# Patient Record
Sex: MALE | Race: WHITE | NOT HISPANIC OR LATINO | Employment: FULL TIME | RURAL
[De-identification: names, ages, dates, MRNs, and addresses within clinical notes are randomized per-mention and may not be internally consistent; named-entity substitution may affect disease eponyms.]

---

## 2017-01-05 DIAGNOSIS — K21.9 GASTROESOPHAGEAL REFLUX DISEASE WITHOUT ESOPHAGITIS: ICD-10-CM

## 2017-01-05 RX ORDER — PANTOPRAZOLE SODIUM 40 MG/1
40 TABLET, DELAYED RELEASE ORAL DAILY
Qty: 30 TABLET | Refills: 5 | Status: CANCELLED | OUTPATIENT
Start: 2017-01-05

## 2017-01-06 RX ORDER — AMITRIPTYLINE HYDROCHLORIDE 25 MG/1
25 TABLET, FILM COATED ORAL NIGHTLY
Qty: 30 TABLET | Refills: 5 | Status: SHIPPED | OUTPATIENT
Start: 2017-01-06 | End: 2018-02-13 | Stop reason: SDUPTHER

## 2017-01-06 NOTE — TELEPHONE ENCOUNTER
Last OV 9/20/16    Amitriptyline  Start 4/15/16 (Historical)    Pantoprazole  9/20/16 #30 x 5 refills (too soon)

## 2017-01-20 ENCOUNTER — OFFICE VISIT (OUTPATIENT)
Dept: FAMILY MEDICINE CLINIC | Facility: CLINIC | Age: 42
End: 2017-01-20

## 2017-01-20 VITALS
DIASTOLIC BLOOD PRESSURE: 88 MMHG | HEART RATE: 84 BPM | BODY MASS INDEX: 29.05 KG/M2 | OXYGEN SATURATION: 96 % | TEMPERATURE: 98.7 F | WEIGHT: 226.4 LBS | SYSTOLIC BLOOD PRESSURE: 140 MMHG | HEIGHT: 74 IN

## 2017-01-20 DIAGNOSIS — R51.9 CHRONIC INTRACTABLE HEADACHE, UNSPECIFIED HEADACHE TYPE: ICD-10-CM

## 2017-01-20 DIAGNOSIS — G89.29 CHRONIC INTRACTABLE HEADACHE, UNSPECIFIED HEADACHE TYPE: ICD-10-CM

## 2017-01-20 DIAGNOSIS — M16.12 ARTHRITIS OF LEFT HIP: ICD-10-CM

## 2017-01-20 DIAGNOSIS — K21.9 GASTROESOPHAGEAL REFLUX DISEASE WITHOUT ESOPHAGITIS: Primary | ICD-10-CM

## 2017-01-20 DIAGNOSIS — E78.49 OTHER HYPERLIPIDEMIA: ICD-10-CM

## 2017-01-20 PROBLEM — E78.5 HYPERLIPIDEMIA: Status: ACTIVE | Noted: 2017-01-20

## 2017-01-20 PROCEDURE — 87338 HPYLORI STOOL AG IA: CPT | Performed by: NURSE PRACTITIONER

## 2017-01-20 PROCEDURE — 99213 OFFICE O/P EST LOW 20 MIN: CPT | Performed by: NURSE PRACTITIONER

## 2017-01-20 RX ORDER — DICLOFENAC POTASSIUM 50 MG/1
TABLET, FILM COATED ORAL
Refills: 5 | COMMUNITY
Start: 2016-12-16 | End: 2018-08-27

## 2017-01-20 RX ORDER — TIZANIDINE 4 MG/1
TABLET ORAL
Refills: 2 | COMMUNITY
Start: 2016-12-16 | End: 2018-08-27

## 2017-01-20 NOTE — MR AVS SNAPSHOT
Devan Alarcon   1/20/2017 9:00 AM   Office Visit    Dept Phone:  [phone]   Encounter #:  [id number]    Provider:  TONO Thorne   Department:  [facility]                Your Full Care Plan              Today's Medication Changes          These changes are accurate as of: 1/20/17 10:18 AM.  If you have any questions, ask your nurse or doctor.               Stop taking medication(s)listed here:     ibuprofen 600 MG tablet   Commonly known as:  ADVIL,MOTRIN   Stopped by:  TONO Thorne                      Your Updated Medication List          This list is accurate as of: 1/20/17 10:18 AM.  Always use your most recent med list.                amitriptyline 25 MG tablet   Commonly known as:  ELAVIL   Take 1 tablet by mouth Every Night.       diclofenac 50 MG tablet   Commonly known as:  CATAFLAM       MULTI VITAMIN DAILY tablet       pantoprazole 40 MG EC tablet   Commonly known as:  PROTONIX   Take 1 tablet by mouth daily.       tiZANidine 4 MG tablet   Commonly known as:  ZANAFLEX               We Performed the Following     Ambulatory Referral to Orthopedic Surgery       You Were Diagnosed With        Codes Comments    Gastroesophageal reflux disease without esophagitis    -  Primary ICD-10-CM: K21.9  ICD-9-CM: 530.81     Other hyperlipidemia     ICD-10-CM: E78.4  ICD-9-CM: 272.4     Chronic intractable headache, unspecified headache type     ICD-10-CM: R51  ICD-9-CM: 784.0     Arthritis of left hip     ICD-10-CM: M19.90  ICD-9-CM: 716.95       Instructions     None    Patient Instructions History      Upcoming Appointments     Visit Type Date Time Department    FOLLOW UP 1/20/2017  9:00 AM [facility] [place]    FOLLOW UP 7/21/2017  1:00 PM F[facility] Signup     Our records indicate that you have declined [facility] sign[facility]. If you would like to sign up for [facility], please email  [email or web address] or call [phone] to obtain an activation code.             Other Info from Your Visit           Your Appointments     Jul 21, 2017  1:00 PM EDT   Follow Up with TONO Thorne   [facility] MEDICINE (--)    [address] [place] [id number] [phone]           Arrive 15 minutes prior to appointment.              Allergies     No Known Allergies      Reason for Visit     Hip Pain           Vital Signs     Blood Pressure Pulse Temperature Height Weight Oxygen Saturation    140/88 (BP Location: Left arm, Patient Position: Sitting) 84 98.7 °F (37.1 °C) (Oral) 73.5\" (186.7 cm) 226 lb 6.4 oz (103 kg) 96%    Body Mass Index Smoking Status                29.46 kg/m2 Current Every Day Smoker          Problems and Diagnoses Noted     Arthritis of left hip    Chronic headache    Acid reflux disease    High cholesterol or triglycerides        "

## 2017-01-20 NOTE — PROGRESS NOTES
"Subjective   Devan Alarcon is a 41 y.o. male.   Chief Complaint   Patient presents with   • Hip Pain     History of Present Illness     The patient presents today for routine follow up. He continues to have the chronic left hip pain. This is worse with movement and physical activity. Somewhat alleviated with rest. Denies low back pain. Denies swelling and redness. Has tried several NSAIDS as well as muscle relaxant with little relief.  Did have an x-ray of the left hip shows no acute bony abnormality but some mild calcification of the superior labrum was noted.  Overall, this is persistent. He has a history of GERD well controlled with Protonix. Chronic headaches are controlled with Elavil at night. He was also noted to have elevated lipids. Did not have fasting lab recheck.     The following portions of the patient's history were reviewed and updated as appropriate: allergies, current medications, past family history, past medical history, past social history, past surgical history and problem list.  Visit Vitals   • /88 (BP Location: Left arm, Patient Position: Sitting)   • Pulse 84   • Temp 98.7 °F (37.1 °C) (Oral)   • Ht 73.5\" (186.7 cm)   • Wt 226 lb 6.4 oz (103 kg)   • SpO2 96%   • BMI 29.46 kg/m2     Review of Systems   Constitutional: Negative for chills, fatigue and fever.   HENT: Negative for congestion, ear pain, rhinorrhea and sore throat.    Respiratory: Negative for cough, shortness of breath and wheezing.    Cardiovascular: Negative for chest pain, palpitations and leg swelling.   Gastrointestinal: Negative for abdominal pain, diarrhea, nausea and vomiting.   Genitourinary: Negative for dysuria and urgency.   Musculoskeletal: Positive for arthralgias. Negative for back pain and myalgias.   Skin: Negative for rash and wound.   Neurological: Negative for dizziness and light-headedness.   Psychiatric/Behavioral: Negative for sleep disturbance. The patient is not nervous/anxious.        Objective "   Physical Exam   Constitutional: He is oriented to person, place, and time. He appears well-developed and well-nourished.   HENT:   Head: Normocephalic and atraumatic.   Cardiovascular: Normal rate, regular rhythm and normal heart sounds.    Pulmonary/Chest: Effort normal and breath sounds normal.   Abdominal: Soft. Bowel sounds are normal.   Musculoskeletal:   Left hip non tender.  Limited ROM left hip due to pain   Neurological: He is alert and oriented to person, place, and time.   Skin: Skin is warm and dry.   Psychiatric: He has a normal mood and affect. His behavior is normal.       Assessment/Plan   Devan was seen today for hip pain.    Diagnoses and all orders for this visit:    Gastroesophageal reflux disease without esophagitis    Other hyperlipidemia  -     Lipid Panel; Future  -     CBC & Differential; Future  -     Comprehensive Metabolic Panel; Future    Chronic intractable headache, unspecified headache type    Arthritis of left hip  -     Ambulatory Referral to Orthopedic Surgery      He would like to try a local injection of the hip. Will refer to orthopedics for further evaluation and treatment. Will continue the current medication regimen. We have also discussed lipids. His ASCVD risk is 11.4%, he is a smoker. He is agreeable to initiate statin. We have discussed the medication at length, including possible SE. He will return next week for fasting labs. Will plan to initiate Simvastatin at that time. Follow up here in 4 months and PRN.

## 2017-01-24 ENCOUNTER — LAB (OUTPATIENT)
Dept: FAMILY MEDICINE CLINIC | Facility: CLINIC | Age: 42
End: 2017-01-24

## 2017-01-24 DIAGNOSIS — E78.49 OTHER HYPERLIPIDEMIA: ICD-10-CM

## 2017-01-24 LAB
ALBUMIN SERPL-MCNC: 4.1 G/DL (ref 3.5–5)
ALBUMIN/GLOB SERPL: 1.3 G/DL (ref 1.5–2.5)
ALP SERPL-CCNC: 82 U/L (ref 46–116)
ALT SERPL W P-5'-P-CCNC: 36 U/L (ref 10–44)
ANION GAP SERPL CALCULATED.3IONS-SCNC: 3.3 MMOL/L (ref 3.6–11.2)
AST SERPL-CCNC: 28 U/L (ref 10–34)
BASOPHILS # BLD AUTO: 0.03 10*3/MM3 (ref 0–0.3)
BASOPHILS NFR BLD AUTO: 0.4 % (ref 0–2)
BILIRUB SERPL-MCNC: 0.8 MG/DL (ref 0.2–1.8)
BUN BLD-MCNC: 11 MG/DL (ref 7–21)
BUN/CREAT SERPL: 10 (ref 7–25)
CALCIUM SPEC-SCNC: 9.2 MG/DL (ref 7.7–10)
CHLORIDE SERPL-SCNC: 108 MMOL/L (ref 99–112)
CHOLEST SERPL-MCNC: 248 MG/DL (ref 0–200)
CO2 SERPL-SCNC: 31.7 MMOL/L (ref 24.3–31.9)
CREAT BLD-MCNC: 1.1 MG/DL (ref 0.43–1.29)
DEPRECATED RDW RBC AUTO: 44.8 FL (ref 37–54)
EOSINOPHIL # BLD AUTO: 0.15 10*3/MM3 (ref 0–0.7)
EOSINOPHIL NFR BLD AUTO: 1.9 % (ref 0–5)
ERYTHROCYTE [DISTWIDTH] IN BLOOD BY AUTOMATED COUNT: 13.5 % (ref 11.5–14.5)
GFR SERPL CREATININE-BSD FRML MDRD: 73 ML/MIN/1.73
GLOBULIN UR ELPH-MCNC: 3.2 GM/DL
GLUCOSE BLD-MCNC: 93 MG/DL (ref 70–110)
HCT VFR BLD AUTO: 48.5 % (ref 42–52)
HDLC SERPL-MCNC: 35 MG/DL (ref 60–100)
HGB BLD-MCNC: 16.3 G/DL (ref 14–18)
IMM GRANULOCYTES # BLD: 0.01 10*3/MM3 (ref 0–0.03)
IMM GRANULOCYTES NFR BLD: 0.1 % (ref 0–0.5)
LDLC SERPL CALC-MCNC: 155 MG/DL (ref 0–100)
LDLC/HDLC SERPL: 4.42 {RATIO}
LYMPHOCYTES # BLD AUTO: 3.73 10*3/MM3 (ref 1–3)
LYMPHOCYTES NFR BLD AUTO: 46.9 % (ref 21–51)
MCH RBC QN AUTO: 31 PG (ref 27–33)
MCHC RBC AUTO-ENTMCNC: 33.6 G/DL (ref 33–37)
MCV RBC AUTO: 92.4 FL (ref 80–94)
MONOCYTES # BLD AUTO: 0.59 10*3/MM3 (ref 0.1–0.9)
MONOCYTES NFR BLD AUTO: 7.4 % (ref 0–10)
NEUTROPHILS # BLD AUTO: 3.44 10*3/MM3 (ref 1.4–6.5)
NEUTROPHILS NFR BLD AUTO: 43.3 % (ref 30–70)
OSMOLALITY SERPL CALC.SUM OF ELEC: 284.1 MOSM/KG (ref 273–305)
PLATELET # BLD AUTO: 256 10*3/MM3 (ref 130–400)
PMV BLD AUTO: 10.6 FL (ref 6–10)
POTASSIUM BLD-SCNC: 4.2 MMOL/L (ref 3.5–5.3)
PROT SERPL-MCNC: 7.3 G/DL (ref 6–8)
RBC # BLD AUTO: 5.25 10*6/MM3 (ref 4.7–6.1)
SODIUM BLD-SCNC: 143 MMOL/L (ref 135–153)
TRIGL SERPL-MCNC: 292 MG/DL (ref 0–150)
VLDLC SERPL-MCNC: 58.4 MG/DL
WBC NRBC COR # BLD: 7.95 10*3/MM3 (ref 4.5–12.5)

## 2017-01-24 PROCEDURE — 85025 COMPLETE CBC W/AUTO DIFF WBC: CPT | Performed by: NURSE PRACTITIONER

## 2017-01-24 PROCEDURE — 80053 COMPREHEN METABOLIC PANEL: CPT | Performed by: NURSE PRACTITIONER

## 2017-01-24 PROCEDURE — 36415 COLL VENOUS BLD VENIPUNCTURE: CPT | Performed by: NURSE PRACTITIONER

## 2017-01-24 PROCEDURE — 80061 LIPID PANEL: CPT | Performed by: NURSE PRACTITIONER

## 2017-01-25 RX ORDER — SIMVASTATIN 20 MG
20 TABLET ORAL NIGHTLY
Qty: 30 TABLET | Refills: 5 | Status: SHIPPED | OUTPATIENT
Start: 2017-01-25 | End: 2018-08-27

## 2017-02-09 ENCOUNTER — TELEPHONE (OUTPATIENT)
Dept: FAMILY MEDICINE CLINIC | Facility: CLINIC | Age: 42
End: 2017-02-09

## 2017-02-09 NOTE — TELEPHONE ENCOUNTER
LUCY ALICEA CALLED FOR DIONNE. HE HAS BAD SORE THROAT AND COUGH NO FEVER .TRIED TO GET OFF WORK TO COME IN BUT THEY WOULDN'T LET HIM OFF.  WANTED US TO IS THERE ANYWAY YOU CAN SEND SOMETHING IN FOR HIM. CALL LUCY   [phone] USES [facility]

## 2017-02-10 NOTE — TELEPHONE ENCOUNTER
Notified Autumn Thurston needed to see Devan before prescribing anything.    She stated understood and he was going to visit a [facility] since he wasn't able to get off work.

## 2017-02-17 DIAGNOSIS — M25.552 LEFT HIP PAIN: Primary | ICD-10-CM

## 2017-02-21 ENCOUNTER — OFFICE VISIT (OUTPATIENT)
Dept: FAMILY MEDICINE CLINIC | Facility: CLINIC | Age: 42
End: 2017-02-21

## 2017-02-21 VITALS
SYSTOLIC BLOOD PRESSURE: 147 MMHG | WEIGHT: 221.2 LBS | BODY MASS INDEX: 28.39 KG/M2 | DIASTOLIC BLOOD PRESSURE: 97 MMHG | OXYGEN SATURATION: 97 % | TEMPERATURE: 96.8 F | HEIGHT: 74 IN | HEART RATE: 71 BPM

## 2017-02-21 DIAGNOSIS — Z20.818 EXPOSURE TO STREPTOCOCCAL PHARYNGITIS: ICD-10-CM

## 2017-02-21 DIAGNOSIS — J20.8 ACUTE BRONCHITIS DUE TO OTHER SPECIFIED ORGANISMS: Primary | ICD-10-CM

## 2017-02-21 LAB
EXPIRATION DATE: NORMAL
INTERNAL CONTROL: NORMAL
Lab: NORMAL
S PYO AG THROAT QL: NEGATIVE

## 2017-02-21 PROCEDURE — 99213 OFFICE O/P EST LOW 20 MIN: CPT | Performed by: NURSE PRACTITIONER

## 2017-02-21 PROCEDURE — 87880 STREP A ASSAY W/OPTIC: CPT | Performed by: NURSE PRACTITIONER

## 2017-02-21 RX ORDER — AZITHROMYCIN 250 MG/1
TABLET, FILM COATED ORAL
Qty: 6 TABLET | Refills: 0 | Status: SHIPPED | OUTPATIENT
Start: 2017-02-21 | End: 2018-08-27

## 2017-02-21 NOTE — PROGRESS NOTES
"Johan Alarcon is a 41 y.o. male.   Chief Complaint   Patient presents with   • URI     URI    This is a new problem. The current episode started 1 to 4 weeks ago. The problem has been gradually improving. There has been no fever. Associated symptoms include congestion, coughing, rhinorrhea and sinus pain. Pertinent negatives include no abdominal pain, chest pain, diarrhea, dysuria, ear pain, headaches, nausea, rash, sneezing, sore throat, swollen glands, vomiting or wheezing. He has tried antihistamine for the symptoms. The treatment provided moderate relief.        The following portions of the patient's history were reviewed and updated as appropriate: allergies, current medications, past family history, past medical history, past social history, past surgical history and problem list.  Visit Vitals   • /97 (BP Location: Left arm, Patient Position: Sitting)   • Pulse 71   • Temp 96.8 °F (36 °C) (Oral)   • Ht 73.5\" (186.7 cm)   • Wt 221 lb 3.2 oz (100 kg)   • SpO2 97%  Comment: Room air   • BMI 28.79 kg/m2     Review of Systems   Constitutional: Negative for chills, fatigue and fever.   HENT: Positive for congestion and rhinorrhea. Negative for ear pain, sneezing and sore throat.    Respiratory: Positive for cough. Negative for shortness of breath and wheezing.    Cardiovascular: Negative for chest pain, palpitations and leg swelling.   Gastrointestinal: Negative for abdominal pain, diarrhea, nausea and vomiting.   Genitourinary: Negative for dysuria and urgency.   Musculoskeletal: Positive for arthralgias and myalgias. Negative for back pain.   Skin: Negative for rash and wound.   Neurological: Negative for dizziness, light-headedness and headaches.   Psychiatric/Behavioral: Negative for sleep disturbance. The patient is not nervous/anxious.        Objective   Physical Exam   Constitutional: He is oriented to person, place, and time. He appears well-developed and well-nourished.   HENT:   Head: " Normocephalic and atraumatic.   Right Ear: External ear normal.   Left Ear: External ear normal.   Oropharynx with erythema and PND   Cardiovascular: Normal rate, regular rhythm and normal heart sounds.    Pulmonary/Chest: Effort normal.   Wheezing RUL   Abdominal: Soft. Bowel sounds are normal.   Musculoskeletal: Normal range of motion.   Neurological: He is alert and oriented to person, place, and time.   Skin: Skin is warm and dry.   Psychiatric: He has a normal mood and affect. His behavior is normal.       Assessment/Plan   Jack was seen today for uri.    Diagnoses and all orders for this visit:    Exposure to Streptococcal pharyngitis  -     POC Rapid Strep A      Rapid strep negative    Will treat bronchitis with azithromycin as noted. He has tolerated well in the past. Stay hydrated. Continue OTC antihistamine as needed. F/U in 2-3 days if no improvement or symptoms worsen.

## 2017-02-22 ENCOUNTER — OFFICE VISIT (OUTPATIENT)
Dept: ORTHOPEDIC SURGERY | Facility: CLINIC | Age: 42
End: 2017-02-22

## 2017-02-22 ENCOUNTER — APPOINTMENT (OUTPATIENT)
Dept: GENERAL RADIOLOGY | Facility: HOSPITAL | Age: 42
End: 2017-02-22
Attending: ORTHOPAEDIC SURGERY

## 2017-02-22 VITALS
BODY MASS INDEX: 29.95 KG/M2 | DIASTOLIC BLOOD PRESSURE: 93 MMHG | WEIGHT: 226 LBS | HEIGHT: 73 IN | SYSTOLIC BLOOD PRESSURE: 144 MMHG | HEART RATE: 81 BPM

## 2017-02-22 DIAGNOSIS — M25.552 LEFT HIP PAIN: Primary | ICD-10-CM

## 2017-02-22 PROCEDURE — 20610 DRAIN/INJ JOINT/BURSA W/O US: CPT | Performed by: ORTHOPAEDIC SURGERY

## 2017-02-22 PROCEDURE — 99203 OFFICE O/P NEW LOW 30 MIN: CPT | Performed by: ORTHOPAEDIC SURGERY

## 2017-02-22 RX ORDER — LIDOCAINE HYDROCHLORIDE 20 MG/ML
5 INJECTION, SOLUTION INFILTRATION; PERINEURAL ONCE
Status: COMPLETED | OUTPATIENT
Start: 2017-02-22 | End: 2017-02-22

## 2017-02-22 RX ORDER — METHYLPREDNISOLONE ACETATE 40 MG/ML
40 INJECTION, SUSPENSION INTRA-ARTICULAR; INTRALESIONAL; INTRAMUSCULAR; SOFT TISSUE ONCE
Status: COMPLETED | OUTPATIENT
Start: 2017-02-22 | End: 2017-02-22

## 2017-02-22 RX ADMIN — METHYLPREDNISOLONE ACETATE 40 MG: 40 INJECTION, SUSPENSION INTRA-ARTICULAR; INTRALESIONAL; INTRAMUSCULAR; SOFT TISSUE at 08:53

## 2017-02-22 RX ADMIN — LIDOCAINE HYDROCHLORIDE 5 ML: 20 INJECTION, SOLUTION INFILTRATION; PERINEURAL at 08:53

## 2017-02-22 NOTE — PROGRESS NOTES
New Patient Visit        Patient: Devan Alarcon  YOB: 1975  Date of encounter: 2/22/2017      History of Present Illness:   Devan Alarcon is a 41 y.o. male who was referred here today by Karena POWER for evaluation of left hip pain.  He states that approximately 5 years ago he fell from a truck bed landing directly on the left hip.  He developed a hematoma along the lateral aspect of his hip.  He states this improved several months following this and he did well up until about 2 years ago.  He is now noticing pinching pain that is felt deep within the lateral aspect of the hip.  He states it's worse with certain movements and when he lay ON the affected side.    PMH:   Patient Active Problem List   Diagnosis   • Gastroesophageal reflux disease without esophagitis   • Left hip pain   • Encounter for long-term (current) use of medications   • Chronic headache   • Hyperlipidemia     Past Medical History   Diagnosis Date   • Arm fracture    • Arthritis      knee and hands   • GERD (gastroesophageal reflux disease)    • History of migraine headaches        PSH:  Past Surgical History   Procedure Laterality Date   • Mouth surgery         Allergies:   No Known Allergies    Medications:     Current Outpatient Prescriptions:   •  amitriptyline (ELAVIL) 25 MG tablet, Take 1 tablet by mouth Every Night., Disp: 30 tablet, Rfl: 5  •  azithromycin (ZITHROMAX Z-VITA) 250 MG tablet, Take 2 tablets the first day, then 1 tablet daily for 4 days., Disp: 6 tablet, Rfl: 0  •  diclofenac (CATAFLAM) 50 MG tablet, , Disp: , Rfl: 5  •  Multiple Vitamin (MULTI VITAMIN DAILY) tablet, Take  by mouth., Disp: , Rfl:   •  pantoprazole (PROTONIX) 40 MG EC tablet, Take 1 tablet by mouth daily., Disp: 30 tablet, Rfl: 5  •  simvastatin (ZOCOR) 20 MG tablet, Take 1 tablet by mouth Every Night., Disp: 30 tablet, Rfl: 5  •  tiZANidine (ZANAFLEX) 4 MG tablet, , Disp: , Rfl: 2    Social History:  Social History     Social History  "  • Marital status: Single     Spouse name: N/A   • Number of children: N/A   • Years of education: N/A     Occupational History   • Not on file.     Social History Main Topics   • Smoking status: Current Every Day Smoker     Packs/day: 1.00     Years: 15.00   • Smokeless tobacco: Never Used   • Alcohol use No   • Drug use: No   • Sexual activity: Not on file     Other Topics Concern   • Not on file     Social History Narrative       Family History:     Family History   Problem Relation Age of Onset   • Osteoporosis Mother    • No Known Problems Father        Review of Systems:   Review of Systems   Constitutional: Negative.    HENT: Negative.    Respiratory: Positive for cough.    Gastrointestinal: Positive for diarrhea.   Endocrine: Negative.    Genitourinary: Negative.    Skin: Negative.    Neurological: Negative.    Hematological: Negative.    Psychiatric/Behavioral: Positive for dysphoric mood.       Physical Exam: 41 y.o. male  General Appearance:    Alert and oriented x 3, cooperative, in no acute distress                   Vitals:    02/22/17 0808   BP: 144/93   Pulse: 81   Weight: 226 lb (103 kg)   Height: 73\" (185.4 cm)                Musculoskeletal: On examination the left hip he has no significant swelling or ecchymosis.  He has tenderness along the tip of the greater trochanter of the hip.  He has normal range of motion with negative straight leg raise and negative Germaine test.    Radiology:     AP and lateral views of the left hip were reviewed revealing no acute fractures or dislocations.    Assessment    ICD-10-CM ICD-9-CM   1. Left hip pain M25.552 719.45       Plan:  A 41-year-old male with complaints of left hip pain.  He does have point tenderness along the greater trochanter of the hip suggesting bursitis.  Today we proceeded with 40 mg of Depo-Medrol with lidocaine block just along the tip of the greater trochanter of the left hip.  Proximally 10 minutes after the injection he had complete " relief of his symptoms with no further catching sensation.  We will have him follow up in 3 weeks.    Written by, Juliette FALLON, acting as a scribe for Dr. Azam POWER I, Oscar Nascimento MD, personally performed the services described in this documentation as scribed by the above named individual in my presence, and it is both accurate and complete.  2/22/2017  4:41 PM

## 2017-03-13 ENCOUNTER — OFFICE VISIT (OUTPATIENT)
Dept: ORTHOPEDIC SURGERY | Facility: CLINIC | Age: 42
End: 2017-03-13

## 2017-03-13 VITALS — WEIGHT: 213 LBS | HEIGHT: 72 IN | BODY MASS INDEX: 28.85 KG/M2

## 2017-03-13 DIAGNOSIS — M25.552 LEFT HIP PAIN: Primary | ICD-10-CM

## 2017-03-13 PROCEDURE — 20610 DRAIN/INJ JOINT/BURSA W/O US: CPT | Performed by: ORTHOPAEDIC SURGERY

## 2017-03-13 RX ORDER — METHYLPREDNISOLONE ACETATE 40 MG/ML
80 INJECTION, SUSPENSION INTRA-ARTICULAR; INTRALESIONAL; INTRAMUSCULAR; SOFT TISSUE
Status: COMPLETED | OUTPATIENT
Start: 2017-03-13 | End: 2017-03-13

## 2017-03-13 RX ORDER — LIDOCAINE HYDROCHLORIDE 20 MG/ML
2 INJECTION, SOLUTION INFILTRATION; PERINEURAL
Status: COMPLETED | OUTPATIENT
Start: 2017-03-13 | End: 2017-03-13

## 2017-03-13 RX ADMIN — METHYLPREDNISOLONE ACETATE 80 MG: 40 INJECTION, SUSPENSION INTRA-ARTICULAR; INTRALESIONAL; INTRAMUSCULAR; SOFT TISSUE at 11:57

## 2017-03-13 RX ADMIN — LIDOCAINE HYDROCHLORIDE 2 ML: 20 INJECTION, SOLUTION INFILTRATION; PERINEURAL at 11:57

## 2017-03-13 NOTE — PROGRESS NOTES
Devan Alarcon   :1975    Date of encounter:2017        HPI:  Devan Alarcon is a 41 y.o. male who returns here today for follow-up of left hip pain.  At his last office visit he received an injection along the greater trochanter of the left hip.  He states that for a few days he had complete relief of his symptoms.  He states pain has returned and is severe as it was initially.  He denies paresthesias.      Exam:  On examination the left hip he has no significant swelling or ecchymosis. He has tenderness along the tip of the greater trochanter of the hip. He has normal range of motion with negative straight leg raise and negative Germaine test.       Large Joint Arthrocentesis  Date/Time: 3/13/2017 11:57 AM  Consent given by: patient  Timeout: Immediately prior to procedure a time out was called to verify the correct patient, procedure, equipment, support staff and site/side marked as required   Supporting Documentation  Indications: pain   Procedure Details  Location: hip - Hip joint: left hip superior trochanter.  Needle size: 25 G  Approach: lateral  Medications administered: 80 mg methylPREDNISolone acetate 40 MG/ML; 2 mL lidocaine 2%  Patient tolerance: patient tolerated the procedure well with no immediate complications              Assessment    ICD-10-CM ICD-9-CM   1. Left hip pain M25.552 719.45       Plan:  A 41-year-old male with continued complaints of left hip pain.  We tried another injection today and proceeded with 80 mg of Depo-Medrol with lidocaine block test along the greater trochanter of the left hip.  All monitors response the injection and he will return in 3 weeks for follow-up.    Written by, Juliette FALLON, acting as a scribe for Dr. Azam ADRIAN, Oscar Nascimento MD, personally performed the services described in this documentation as scribed by the above named individual in my presence, and it is both accurate and complete.  3/13/2017  1:45 PM    Cc Karena Bustamante  APRN

## 2017-04-03 ENCOUNTER — OFFICE VISIT (OUTPATIENT)
Dept: ORTHOPEDIC SURGERY | Facility: CLINIC | Age: 42
End: 2017-04-03

## 2017-04-03 VITALS
HEIGHT: 72 IN | SYSTOLIC BLOOD PRESSURE: 141 MMHG | DIASTOLIC BLOOD PRESSURE: 99 MMHG | WEIGHT: 213 LBS | HEART RATE: 86 BPM | BODY MASS INDEX: 28.85 KG/M2

## 2017-04-03 DIAGNOSIS — M70.62 TROCHANTERIC BURSITIS, LEFT HIP: Primary | ICD-10-CM

## 2017-04-03 PROCEDURE — 99213 OFFICE O/P EST LOW 20 MIN: CPT | Performed by: ORTHOPAEDIC SURGERY

## 2017-04-03 NOTE — PROGRESS NOTES
Patient: Devan Alarcon  YOB: 1975  Date of Encounter: 04/03/2017        History of Present Illness:     41 y.o. male who returns here today for follow-up of left hip pain. At his last office visit he received an injection along the greater trochanter of the left hip. He states he had complete relief of his symptoms beginning at one week. Greater than 75% improvement continued. Able to tolerate the occasional twinge in pain depending on position and activity. He denies paresthesias    Physical Exam: 41 y.o. male .  General Appearance:    Well appearing, cooperative, in no acute distress.       Patient is alert and oriented x 3.          Musculoskeletal: Exam of the patient's left hip reveals minimal tenderness along the trochanteric bursa. He has full range of motion with xavier's and straight leg test negative. Neurovascular status grossly intact.         Assessment:    ICD-10-CM ICD-9-CM   1. Trochanteric bursitis, left hip M70.62 726.5       Plan:    Discussed waiting on any return of his symptoms. We will continue to treat this conservatively. He was encouraged that just as his alleviation may take several injections to decrease his symptoms. Depending on the length of the efficacy of the injections as well as activity modification may discuss repeat injection of the future. Follow-up as needed.        Discussion and Summary:    Written by Adarsh Beyer PA-C, acting as scribe for Dr. Azam ADRIAN, Oscar Nascimento MD, personally performed the services described in this documentation as scribed by the above named individual in my presence, and it is both accurate and complete.  4/4/2017  10:11 PM      This document was signed by Adarsh Beyer PA-C April 3, 2017

## 2017-06-23 DIAGNOSIS — K21.9 GASTROESOPHAGEAL REFLUX DISEASE WITHOUT ESOPHAGITIS: ICD-10-CM

## 2017-06-23 RX ORDER — PANTOPRAZOLE SODIUM 40 MG/1
40 TABLET, DELAYED RELEASE ORAL DAILY
Qty: 30 TABLET | Refills: 11 | Status: SHIPPED | OUTPATIENT
Start: 2017-06-23 | End: 2018-08-27 | Stop reason: SDUPTHER

## 2018-02-13 RX ORDER — AMITRIPTYLINE HYDROCHLORIDE 25 MG/1
25 TABLET, FILM COATED ORAL NIGHTLY
Qty: 30 TABLET | Refills: 5 | Status: SHIPPED | OUTPATIENT
Start: 2018-02-13 | End: 2018-08-27 | Stop reason: SDUPTHER

## 2018-08-23 DIAGNOSIS — K21.9 GASTROESOPHAGEAL REFLUX DISEASE WITHOUT ESOPHAGITIS: ICD-10-CM

## 2018-08-23 RX ORDER — PANTOPRAZOLE SODIUM 40 MG/1
TABLET, DELAYED RELEASE ORAL
Qty: 30 TABLET | Refills: 0 | OUTPATIENT
Start: 2018-08-23

## 2018-08-27 ENCOUNTER — OFFICE VISIT (OUTPATIENT)
Dept: FAMILY MEDICINE CLINIC | Facility: CLINIC | Age: 43
End: 2018-08-27

## 2018-08-27 VITALS
SYSTOLIC BLOOD PRESSURE: 141 MMHG | DIASTOLIC BLOOD PRESSURE: 76 MMHG | TEMPERATURE: 97.8 F | BODY MASS INDEX: 29.82 KG/M2 | WEIGHT: 220.2 LBS | HEIGHT: 72 IN | HEART RATE: 75 BPM

## 2018-08-27 DIAGNOSIS — J20.9 BRONCHITIS, ACUTE, WITH BRONCHOSPASM: Primary | ICD-10-CM

## 2018-08-27 DIAGNOSIS — G25.81 RESTLESS LEG SYNDROME: ICD-10-CM

## 2018-08-27 DIAGNOSIS — K21.9 GASTROESOPHAGEAL REFLUX DISEASE WITHOUT ESOPHAGITIS: ICD-10-CM

## 2018-08-27 LAB
ALBUMIN SERPL-MCNC: 4.4 G/DL (ref 3.5–5)
ALBUMIN/GLOB SERPL: 1.4 G/DL (ref 1.5–2.5)
ALP SERPL-CCNC: 89 U/L (ref 40–129)
ALT SERPL W P-5'-P-CCNC: 29 U/L (ref 10–44)
ANION GAP SERPL CALCULATED.3IONS-SCNC: 6.9 MMOL/L (ref 3.6–11.2)
AST SERPL-CCNC: 28 U/L (ref 10–34)
BASOPHILS # BLD AUTO: 0.07 10*3/MM3 (ref 0–0.3)
BASOPHILS NFR BLD AUTO: 1 % (ref 0–2)
BILIRUB SERPL-MCNC: 0.4 MG/DL (ref 0.2–1.8)
BUN BLD-MCNC: 15 MG/DL (ref 7–21)
BUN/CREAT SERPL: 14.3 (ref 7–25)
CALCIUM SPEC-SCNC: 10.9 MG/DL (ref 7.7–10)
CHLORIDE SERPL-SCNC: 110 MMOL/L (ref 99–112)
CO2 SERPL-SCNC: 28.1 MMOL/L (ref 24.3–31.9)
CREAT BLD-MCNC: 1.05 MG/DL (ref 0.43–1.29)
DEPRECATED RDW RBC AUTO: 50.6 FL (ref 37–54)
EOSINOPHIL # BLD AUTO: 0.59 10*3/MM3 (ref 0–0.7)
EOSINOPHIL NFR BLD AUTO: 8.8 % (ref 0–5)
ERYTHROCYTE [DISTWIDTH] IN BLOOD BY AUTOMATED COUNT: 16.1 % (ref 11.5–14.5)
GFR SERPL CREATININE-BSD FRML MDRD: 77 ML/MIN/1.73
GLOBULIN UR ELPH-MCNC: 3.2 GM/DL
GLUCOSE BLD-MCNC: 89 MG/DL (ref 70–110)
HCT VFR BLD AUTO: 40.8 % (ref 42–52)
HGB BLD-MCNC: 12.6 G/DL (ref 14–18)
IMM GRANULOCYTES # BLD: 0 10*3/MM3 (ref 0–0.03)
IMM GRANULOCYTES NFR BLD: 0 % (ref 0–0.5)
LYMPHOCYTES # BLD AUTO: 2.85 10*3/MM3 (ref 1–3)
LYMPHOCYTES NFR BLD AUTO: 42.3 % (ref 21–51)
MCH RBC QN AUTO: 26.7 PG (ref 27–33)
MCHC RBC AUTO-ENTMCNC: 30.9 G/DL (ref 33–37)
MCV RBC AUTO: 86.4 FL (ref 80–94)
MONOCYTES # BLD AUTO: 0.58 10*3/MM3 (ref 0.1–0.9)
MONOCYTES NFR BLD AUTO: 8.6 % (ref 0–10)
NEUTROPHILS # BLD AUTO: 2.65 10*3/MM3 (ref 1.4–6.5)
NEUTROPHILS NFR BLD AUTO: 39.3 % (ref 30–70)
OSMOLALITY SERPL CALC.SUM OF ELEC: 289 MOSM/KG (ref 273–305)
PLATELET # BLD AUTO: 358 10*3/MM3 (ref 130–400)
PMV BLD AUTO: 10.4 FL (ref 6–10)
POTASSIUM BLD-SCNC: 4.5 MMOL/L (ref 3.5–5.3)
PROT SERPL-MCNC: 7.6 G/DL (ref 6–8)
RBC # BLD AUTO: 4.72 10*6/MM3 (ref 4.7–6.1)
SODIUM BLD-SCNC: 145 MMOL/L (ref 135–153)
WBC NRBC COR # BLD: 6.74 10*3/MM3 (ref 4.5–12.5)

## 2018-08-27 PROCEDURE — 80053 COMPREHEN METABOLIC PANEL: CPT | Performed by: FAMILY MEDICINE

## 2018-08-27 PROCEDURE — 36415 COLL VENOUS BLD VENIPUNCTURE: CPT | Performed by: FAMILY MEDICINE

## 2018-08-27 PROCEDURE — 99214 OFFICE O/P EST MOD 30 MIN: CPT | Performed by: FAMILY MEDICINE

## 2018-08-27 PROCEDURE — 85025 COMPLETE CBC W/AUTO DIFF WBC: CPT | Performed by: FAMILY MEDICINE

## 2018-08-27 RX ORDER — PANTOPRAZOLE SODIUM 40 MG/1
40 TABLET, DELAYED RELEASE ORAL DAILY
Qty: 30 TABLET | Refills: 6 | Status: SHIPPED | OUTPATIENT
Start: 2018-08-27 | End: 2018-09-07

## 2018-08-27 RX ORDER — ALBUTEROL SULFATE 90 UG/1
2 AEROSOL, METERED RESPIRATORY (INHALATION) EVERY 6 HOURS PRN
Qty: 1 INHALER | Refills: 1 | Status: SHIPPED | OUTPATIENT
Start: 2018-08-27 | End: 2019-02-07

## 2018-08-27 RX ORDER — AMITRIPTYLINE HYDROCHLORIDE 25 MG/1
25 TABLET, FILM COATED ORAL NIGHTLY
Qty: 30 TABLET | Refills: 5 | Status: SHIPPED | OUTPATIENT
Start: 2018-08-27 | End: 2020-03-04

## 2018-08-27 RX ORDER — ROPINIROLE 0.5 MG/1
0.5 TABLET, FILM COATED ORAL NIGHTLY
Qty: 30 TABLET | Refills: 1 | Status: SHIPPED | OUTPATIENT
Start: 2018-08-27 | End: 2018-09-13 | Stop reason: SDUPTHER

## 2018-08-27 NOTE — PROGRESS NOTES
Subjective   Devan Alarcon is a 42 y.o. male.     History of Present Illness several days of wheezy cough.  Rarely productive.  No fever.  No no GI  acutely.  Having persistence of significant reflux symptoms.  Lots of nighttime symptoms.  Essentially almost sitting up in bed to sleep quite consistently.  Having challenges with symptoms consistent with restless leg phenomena.  Seemed to start sometime ago after brief trial of statin.  Continues to use tobacco.  No associated vomiting diarrhea.  Medications are as reconciled.  Amitriptyline has been very helpful for the historical headaches.    The following portions of the patient's history were reviewed and updated as appropriate: current medications, past family history, past medical history, past social history, past surgical history and problem list.    Review of Systems see history of present illness    Objective   Physical Exam   Constitutional: He is oriented to person, place, and time. He appears well-developed and well-nourished.   HENT:   Head: Normocephalic.   Right Ear: External ear normal.   Left Ear: External ear normal.   Mouth/Throat: Oropharynx is clear and moist.   Eyes: Pupils are equal, round, and reactive to light. Conjunctivae and EOM are normal.   Neck: Normal range of motion. Neck supple. No tracheal deviation present. No thyromegaly present.   Cardiovascular: Normal rate, regular rhythm and normal heart sounds.    No murmur heard.  Pulmonary/Chest: Effort normal. He has wheezes. He has no rales.   Abdominal: Soft. There is no tenderness.   Lymphadenopathy:     He has no cervical adenopathy.   Neurological: He is alert and oriented to person, place, and time. He displays normal reflexes. Coordination normal.   Skin: Skin is warm and dry.   Psychiatric: He has a normal mood and affect.   Vitals reviewed.      Assessment/Plan   Devan was seen today for med refill and restless legs syndrome.    Diagnoses and all orders for this  visit:    Bronchitis, acute, with bronchospasm  -     CBC & Differential  -     albuterol (PROVENTIL HFA;VENTOLIN HFA) 108 (90 Base) MCG/ACT inhaler; Inhale 2 puffs Every 6 (Six) Hours As Needed for Wheezing.  -     CBC Auto Differential    Gastroesophageal reflux disease without esophagitis  -     pantoprazole (PROTONIX) 40 MG EC tablet; Take 1 tablet by mouth Daily.  -     CBC & Differential  -     Ambulatory Referral to Gastroenterology  -     CBC Auto Differential    Restless leg syndrome  -     CBC & Differential  -     Comprehensive Metabolic Panel  -     rOPINIRole (REQUIP) 0.5 MG tablet; Take 1 tablet by mouth Every Night. Take 1 hour before bedtime.  -     CBC Auto Differential    Other orders  -     amitriptyline (ELAVIL) 25 MG tablet; Take 1 tablet by mouth Every Night.     Counseled.  Stop tobacco use.  Bronchodilator.  Instructed.  Renewed PPI.  With the longevity and reported severity of symptoms will refer.  I believe you deserve endoscopy.  For the restless leg phenomena med recommended.  Continue chronic.  Stay safely active.  Counseled regarding lipids but at this time you are not very interested in medication.  Will notify of lab results.  Follow-up pending.      Patient's Body mass index is 29.86 kg/m². BMI is above normal parameters. Recommendations include: exercise counseling and nutrition counseling.

## 2018-08-28 NOTE — PROGRESS NOTES
Lab testing shows very mild anemia.  Definitely needs to have the GI visit.  I believe you deserve to have endoscopy as we discussed.

## 2018-09-07 ENCOUNTER — CONSULT (OUTPATIENT)
Dept: GASTROENTEROLOGY | Facility: CLINIC | Age: 43
End: 2018-09-07

## 2018-09-07 VITALS
OXYGEN SATURATION: 97 % | SYSTOLIC BLOOD PRESSURE: 135 MMHG | BODY MASS INDEX: 30.48 KG/M2 | HEART RATE: 91 BPM | DIASTOLIC BLOOD PRESSURE: 83 MMHG | WEIGHT: 225 LBS | HEIGHT: 72 IN

## 2018-09-07 DIAGNOSIS — K21.9 GASTROESOPHAGEAL REFLUX DISEASE, ESOPHAGITIS PRESENCE NOT SPECIFIED: Primary | ICD-10-CM

## 2018-09-07 PROCEDURE — 99243 OFF/OP CNSLTJ NEW/EST LOW 30: CPT | Performed by: PHYSICIAN ASSISTANT

## 2018-09-07 RX ORDER — DEXLANSOPRAZOLE 60 MG/1
60 CAPSULE, DELAYED RELEASE ORAL DAILY
Qty: 30 CAPSULE | Refills: 5 | Status: SHIPPED | OUTPATIENT
Start: 2018-09-07 | End: 2019-04-12 | Stop reason: SDUPTHER

## 2018-09-07 NOTE — PROGRESS NOTES
Chief Complaint   Patient presents with   • Heartburn     Devan Alarcon is a 42 y.o. male who presents to the office today as a consultaton at the request of Dr. Jaswinder Zheng for evaluation of Heartburn.    HPI  Currently, he complains of very severe acid reflux even while taking Protonix 40 mg once daily. In the past, he has taken Carlyn-seltzer, TUMs, Rolaids, Pepcid, Zantac, Prilosec, Nexium, Prevacid without relief. He has noticed that they all seem to help some but never improve his symptoms completely. He has never taken Protonix twice daily. He has noticed that the Protonix helps to relieve the burning but the acid reflux still occurs. He has been awakening with severe acid reflux and choking related to the acid for the past 20+ years. He sleeps sitting up in order to relieve the problem. He uses pillows to elevate his head in bed. No nausea, vomiting or regurgitation after meals or dysphagia. No prior history of EGD. Has had negative serum testing for H pylori.     Denies any trouble with bowel habits. He has intermittent constipation and pain has been intermittent just below the umbilicus but this resolved. No rectal bleeding. There is no known family history of colon cancer, stomach cancer, esophageal cancer or colon polyps.    Review of Systems   Constitutional: Negative for chills, fatigue and fever.   HENT: Negative for congestion, sore throat and trouble swallowing.    Eyes: Negative.    Respiratory: Negative.    Cardiovascular: Negative for chest pain, palpitations and leg swelling.   Gastrointestinal: Negative for abdominal distention, abdominal pain, anal bleeding, blood in stool, constipation, diarrhea, nausea, rectal pain and vomiting.   Endocrine: Positive for cold intolerance and heat intolerance.   Genitourinary: Negative.    Musculoskeletal: Positive for arthralgias, back pain and myalgias.   Skin: Negative.    Allergic/Immunologic: Positive for environmental allergies.   Neurological:  "Negative for dizziness and light-headedness.   Hematological: Bruises/bleeds easily.   Psychiatric/Behavioral: The patient is nervous/anxious.      Specialty Problems        Gastroenterology Problems    Gastroesophageal reflux disease without esophagitis            Past Medical History:   Diagnosis Date   • Arm fracture    • Arthritis     knee and hands   • GERD (gastroesophageal reflux disease)    • History of migraine headaches      Past Surgical History:   Procedure Laterality Date   • MOUTH SURGERY       Family History   Problem Relation Age of Onset   • Osteoporosis Mother    • No Known Problems Father      Social History   Substance Use Topics   • Smoking status: Current Every Day Smoker     Packs/day: 1.00     Years: 15.00   • Smokeless tobacco: Never Used   • Alcohol use No       CURRENT MEDICATION:  •  albuterol (PROVENTIL HFA;VENTOLIN HFA) 108 (90 Base) MCG/ACT inhaler, Inhale 2 puffs Every 6 (Six) Hours As Needed for Wheezing., Disp: 1 inhaler, Rfl:  •  amitriptyline (ELAVIL) 25 MG tablet, Take 1 tablet by mouth Every Night., Disp: 30 tablet, Rfl: 5  •  Multiple Vitamin (MULTI VITAMIN DAILY) tablet, Take  by mouth., Disp: , Rfl:   •  pantoprazole (PROTONIX) 40 MG EC tablet, Take 1 tablet by mouth Daily., Disp: 30 tablet, Rfl: 6  •  rOPINIRole (REQUIP) 0.5 MG tablet, Take 1 tablet by mouth Every Night. Take 1 hour before bedtime., Disp: 30 tablet, Rfl: 1    ALLERGIES:  Patient has no known allergies.    VISIT VITALS:  /83   Pulse 91   Ht 182.9 cm (72\")   Wt 102 kg (225 lb)   SpO2 97%   BMI 30.52 kg/m²     Physical Exam   Constitutional: He is oriented to person, place, and time. He appears well-developed and well-nourished. No distress.   HENT:   Head: Normocephalic and atraumatic.   Nose: Nose normal.   Mouth/Throat: Oropharynx is clear and moist.   Eyes: Conjunctivae are normal. Right eye exhibits no discharge. Left eye exhibits no discharge. No scleral icterus.   Neck: Normal range of motion. " No JVD present.   Cardiovascular: Normal rate, regular rhythm and normal heart sounds.  Exam reveals no gallop and no friction rub.    No murmur heard.  Pulmonary/Chest: Effort normal and breath sounds normal. No respiratory distress. He has no wheezes. He has no rales. He exhibits no tenderness.   Abdominal: Soft. Bowel sounds are normal. He exhibits no mass. There is no tenderness.   Musculoskeletal: Normal range of motion. He exhibits no edema or deformity.   Neurological: He is alert and oriented to person, place, and time. Coordination normal.   Skin: Skin is warm and dry. No rash noted. He is not diaphoretic. No erythema.   Psychiatric: He has a normal mood and affect. His behavior is normal. Judgment and thought content normal.   Vitals reviewed.      Assessment/Plan     1. Gastroesophageal reflux disease, esophagitis presence not specified      Acid reflux is very severe and awakening him at night with symptoms. He will discontinue Protonix due to inefficacy and start taking Dexilant 60 mg once daily. EGD will be performed to further evaluate his complaints.     New Medications Ordered This Visit   Medications   • dexlansoprazole (DEXILANT) 60 MG capsule     Sig: Take 1 capsule by mouth Daily.     Dispense:  30 capsule     Refill:  5     Orders Placed This Encounter   Procedures   • Follow Anesthesia Guidelines / Standing Orders     ESOPHAGOGASTRODUODENOSCOPY WITH BIOPSY CPT CODE: 83321 (N/A)  He will need an esophagogastroduodenoscopy performed with IV general sedation. All of the risks, benefits and alternatives of this procedure have been discussed with him, all of his questions have been answered and he has elected to proceed. He should follow up in the office after this procedure to discuss the results and further recommendations can be made at that time.          Return for follow up after procedure to discuss results.        Electronically signed 9/7/2018 12:06 PM  Karen May PA-C, [facility]  [facility]

## 2018-09-12 ENCOUNTER — TELEPHONE (OUTPATIENT)
Dept: FAMILY MEDICINE CLINIC | Facility: CLINIC | Age: 43
End: 2018-09-12

## 2018-09-13 DIAGNOSIS — G25.81 RESTLESS LEG SYNDROME: ICD-10-CM

## 2018-09-13 PROBLEM — K21.9 GASTROESOPHAGEAL REFLUX DISEASE: Status: ACTIVE | Noted: 2018-09-13

## 2018-09-13 RX ORDER — PANTOPRAZOLE SODIUM 40 MG/1
40 TABLET, DELAYED RELEASE ORAL 2 TIMES DAILY
Qty: 60 TABLET | Refills: 1 | Status: SHIPPED | OUTPATIENT
Start: 2018-09-13 | End: 2018-09-25 | Stop reason: SDUPTHER

## 2018-09-13 NOTE — TELEPHONE ENCOUNTER
CALLED PATIENT TO SEE WHY HE WAS REQUESTING A DOSAGE ADJUSTMENT PT C/O ACHING IN LEGS AND THE NEED TO CONSTANTLY MOVE

## 2018-09-15 RX ORDER — ROPINIROLE 1 MG/1
1 TABLET, FILM COATED ORAL NIGHTLY
Qty: 30 TABLET | Refills: 1 | Status: SHIPPED | OUTPATIENT
Start: 2018-09-15 | End: 2018-10-24 | Stop reason: SDUPTHER

## 2018-09-19 RX ORDER — MULTIVITAMIN WITH IRON
1 TABLET ORAL DAILY
COMMUNITY
End: 2019-02-07

## 2018-09-20 ENCOUNTER — ANESTHESIA (OUTPATIENT)
Dept: PERIOP | Facility: HOSPITAL | Age: 43
End: 2018-09-20

## 2018-09-20 ENCOUNTER — HOSPITAL ENCOUNTER (OUTPATIENT)
Facility: HOSPITAL | Age: 43
Setting detail: HOSPITAL OUTPATIENT SURGERY
Discharge: HOME | End: 2018-09-20
Attending: SURGERY | Admitting: ANESTHESIOLOGY

## 2018-09-20 ENCOUNTER — ANESTHESIA EVENT (OUTPATIENT)
Dept: PERIOP | Facility: HOSPITAL | Age: 43
End: 2018-09-20

## 2018-09-20 VITALS
HEART RATE: 70 BPM | SYSTOLIC BLOOD PRESSURE: 119 MMHG | HEIGHT: 74 IN | RESPIRATION RATE: 18 BRPM | OXYGEN SATURATION: 95 % | DIASTOLIC BLOOD PRESSURE: 75 MMHG | TEMPERATURE: 97.3 F | BODY MASS INDEX: 28.88 KG/M2 | WEIGHT: 225 LBS

## 2018-09-20 DIAGNOSIS — K21.00 GASTROESOPHAGEAL REFLUX DISEASE WITH ESOPHAGITIS: Primary | ICD-10-CM

## 2018-09-20 DIAGNOSIS — K21.9 GASTROESOPHAGEAL REFLUX DISEASE, ESOPHAGITIS PRESENCE NOT SPECIFIED: ICD-10-CM

## 2018-09-20 PROCEDURE — 25010000002 FENTANYL CITRATE (PF) 100 MCG/2ML SOLUTION: Performed by: NURSE ANESTHETIST, CERTIFIED REGISTERED

## 2018-09-20 PROCEDURE — 25010000002 PROPOFOL 1000 MG/ML EMULSION: Performed by: NURSE ANESTHETIST, CERTIFIED REGISTERED

## 2018-09-20 PROCEDURE — 43239 EGD BIOPSY SINGLE/MULTIPLE: CPT | Performed by: SURGERY

## 2018-09-20 RX ORDER — ONDANSETRON 2 MG/ML
4 INJECTION INTRAMUSCULAR; INTRAVENOUS ONCE AS NEEDED
Status: DISCONTINUED | OUTPATIENT
Start: 2018-09-20 | End: 2018-09-20 | Stop reason: HOSPADM

## 2018-09-20 RX ORDER — MIDAZOLAM HYDROCHLORIDE 1 MG/ML
1 INJECTION INTRAMUSCULAR; INTRAVENOUS
Status: DISCONTINUED | OUTPATIENT
Start: 2018-09-20 | End: 2018-09-20 | Stop reason: HOSPADM

## 2018-09-20 RX ORDER — OXYCODONE HYDROCHLORIDE AND ACETAMINOPHEN 5; 325 MG/1; MG/1
1 TABLET ORAL ONCE AS NEEDED
Status: DISCONTINUED | OUTPATIENT
Start: 2018-09-20 | End: 2018-09-20 | Stop reason: HOSPADM

## 2018-09-20 RX ORDER — FENTANYL CITRATE 50 UG/ML
INJECTION, SOLUTION INTRAMUSCULAR; INTRAVENOUS AS NEEDED
Status: DISCONTINUED | OUTPATIENT
Start: 2018-09-20 | End: 2018-09-20 | Stop reason: SURG

## 2018-09-20 RX ORDER — SODIUM CHLORIDE 0.9 % (FLUSH) 0.9 %
1-10 SYRINGE (ML) INJECTION AS NEEDED
Status: DISCONTINUED | OUTPATIENT
Start: 2018-09-20 | End: 2018-09-20 | Stop reason: HOSPADM

## 2018-09-20 RX ORDER — MEPERIDINE HYDROCHLORIDE 50 MG/ML
12.5 INJECTION INTRAMUSCULAR; INTRAVENOUS; SUBCUTANEOUS
Status: DISCONTINUED | OUTPATIENT
Start: 2018-09-20 | End: 2018-09-20 | Stop reason: HOSPADM

## 2018-09-20 RX ORDER — IPRATROPIUM BROMIDE AND ALBUTEROL SULFATE 2.5; .5 MG/3ML; MG/3ML
3 SOLUTION RESPIRATORY (INHALATION) ONCE AS NEEDED
Status: DISCONTINUED | OUTPATIENT
Start: 2018-09-20 | End: 2018-09-20 | Stop reason: HOSPADM

## 2018-09-20 RX ORDER — MIDAZOLAM HYDROCHLORIDE 1 MG/ML
2 INJECTION INTRAMUSCULAR; INTRAVENOUS
Status: DISCONTINUED | OUTPATIENT
Start: 2018-09-20 | End: 2018-09-20 | Stop reason: HOSPADM

## 2018-09-20 RX ORDER — FENTANYL CITRATE 50 UG/ML
50 INJECTION, SOLUTION INTRAMUSCULAR; INTRAVENOUS
Status: DISCONTINUED | OUTPATIENT
Start: 2018-09-20 | End: 2018-09-20 | Stop reason: HOSPADM

## 2018-09-20 RX ORDER — LIDOCAINE HYDROCHLORIDE 20 MG/ML
INJECTION, SOLUTION INFILTRATION; PERINEURAL AS NEEDED
Status: DISCONTINUED | OUTPATIENT
Start: 2018-09-20 | End: 2018-09-20 | Stop reason: SURG

## 2018-09-20 RX ORDER — SODIUM CHLORIDE, SODIUM LACTATE, POTASSIUM CHLORIDE, CALCIUM CHLORIDE 600; 310; 30; 20 MG/100ML; MG/100ML; MG/100ML; MG/100ML
125 INJECTION, SOLUTION INTRAVENOUS CONTINUOUS
Status: DISCONTINUED | OUTPATIENT
Start: 2018-09-20 | End: 2018-09-20 | Stop reason: HOSPADM

## 2018-09-20 RX ADMIN — SODIUM CHLORIDE, POTASSIUM CHLORIDE, SODIUM LACTATE AND CALCIUM CHLORIDE: 600; 310; 30; 20 INJECTION, SOLUTION INTRAVENOUS at 07:38

## 2018-09-20 RX ADMIN — PROPOFOL 150 MCG/KG/MIN: 10 INJECTION, EMULSION INTRAVENOUS at 07:39

## 2018-09-20 RX ADMIN — FENTANYL CITRATE 100 MCG: 50 INJECTION INTRAMUSCULAR; INTRAVENOUS at 07:39

## 2018-09-20 RX ADMIN — LIDOCAINE HYDROCHLORIDE 40 MG: 20 INJECTION, SOLUTION INFILTRATION; PERINEURAL at 07:39

## 2018-09-20 NOTE — OP NOTE
ESOPHAGOGASTRODUODENOSCOPY WITH BIOPSY  Procedure Note    Devan Alarcon  9/20/2018    Pre-op Diagnosis:   Gastroesophageal reflux disease, esophagitis presence not specified [K21.9]    Post-op Diagnosis:   Gastritis, reflux esophagitis with mid esophageal ulcer, moderate hiatal hernia    Indications: See above    Procedure(s):  ESOPHAGOGASTRODUODENOSCOPY WITH BIOPSY CPT CODE: 55583    Surgeon(s):  Baron Mccall MD    Anesthesia: General    Staff:   Circulator: Dana Montana RN  Scrub Person: Ofe Dukes    Findings: Mild antral gastritis, small to moderate hiatal hernia, in the mid esophagus there was evidence of ulceration and reflux changes.  Ulcer located at 27 cm    Operative Procedure: The patient was taken operating suite and placed in left lateral decubitus position.  Bilateral sequential compression devices were in place and IV anesthesia was administered.  Timeout procedure was performed.  The endoscope was inserted into the oropharynx and the esophagus was intubated.  The scope was advanced to the third portion of the duodenum.  The scope was slowly withdrawn evaluating all mucosal areas.  The patient had mild antral gastritis.  Biopsy forceps were used to sample the area.  Retroflexion showed no evidence of mass or ulceration in this remaining gastric body however there was a small to moderate hiatal hernia present.  The scope was withdrawn to the GE junction which had some reflux changes with mild esophagitis.  The scope was slowly withdrawn and in the mid esophagus at approximately 27 cm there was an area of ulceration that was biopsied with biopsy forceps.  The remainder the esophageal mucosa was within normal limits..  The remainder of the esophageal mucosa was within normal limits and the scope was removed and the patient awakened from anesthesia and taken to recovery.    Estimated Blood Loss: minimal    Specimens:   Antral biopsy, mid esophageal biopsy                 Drains:  none    Grafts or Implants: none    Complications: none    Recommendations: Continue PPI therapy, esophagram, follow-up in 2 weeks    Baron Mccall MD     Date: 9/20/2018  Time: 8:06 AM

## 2018-09-20 NOTE — ANESTHESIA POSTPROCEDURE EVALUATION
Patient: Devan Alarcon    Procedure Summary     Date:  09/20/18 Room / Location:  [facility] OR  /  [facility] OR    Anesthesia Start:  0738 Anesthesia Stop:  0753    Procedure:  ESOPHAGOGASTRODUODENOSCOPY WITH BIOPSY CPT CODE: 83137 (N/A Esophagus) Diagnosis:       Gastroesophageal reflux disease, esophagitis presence not specified      (Gastroesophageal reflux disease, esophagitis presence not specified [K21.9])    Surgeon:  Baron Mccall MD Provider:  Deandre Parks MD    Anesthesia Type:  general ASA Status:  2          Anesthesia Type: general  Last vitals  BP   117/75 (09/20/18 0800)   Temp   97.3 °F (36.3 °C) (09/20/18 0755)   Pulse   68 (09/20/18 0800)   Resp   18 (09/20/18 0800)     SpO2   95 % (09/20/18 0800)     Post Anesthesia Care and Evaluation    Patient location during evaluation: PHASE II  Patient participation: complete - patient participated  Level of consciousness: awake and alert  Pain score: 1  Pain management: adequate  Airway patency: patent  Anesthetic complications: No anesthetic complications  PONV Status: controlled  Cardiovascular status: acceptable  Respiratory status: acceptable  Hydration status: acceptable

## 2018-09-20 NOTE — ANESTHESIA PREPROCEDURE EVALUATION
Anesthesia Evaluation     Patient summary reviewed and Nursing notes reviewed   no history of anesthetic complications:  NPO Solid Status: > 8 hours  NPO Liquid Status: > 8 hours           Airway   Mallampati: I  TM distance: >3 FB  Neck ROM: full  No difficulty expected  Dental - normal exam     Pulmonary - negative pulmonary ROS and normal exam   Cardiovascular - normal exam    (+) hyperlipidemia,       Neuro/Psych- negative ROS  GI/Hepatic/Renal/Endo    (+)  GERD,      Musculoskeletal (-) negative ROS    Abdominal  - normal exam    Bowel sounds: normal.   Substance History - negative use     OB/GYN negative ob/gyn ROS         Other                        Anesthesia Plan    ASA 2     general     intravenous induction   Anesthetic plan, all risks, benefits, and alternatives have been provided, discussed and informed consent has been obtained with: patient.

## 2018-09-20 NOTE — H&P (VIEW-ONLY)
Chief Complaint   Patient presents with   • Heartburn     Devan Alarcon is a 42 y.o. male who presents to the office today as a consultaton at the request of Dr. Jaswinder Zheng for evaluation of Heartburn.    HPI  Currently, he complains of very severe acid reflux even while taking Protonix 40 mg once daily. In the past, he has taken Carlyn-seltzer, TUMs, Rolaids, Pepcid, Zantac, Prilosec, Nexium, Prevacid without relief. He has noticed that they all seem to help some but never improve his symptoms completely. He has never taken Protonix twice daily. He has noticed that the Protonix helps to relieve the burning but the acid reflux still occurs. He has been awakening with severe acid reflux and choking related to the acid for the past 20+ years. He sleeps sitting up in order to relieve the problem. He uses pillows to elevate his head in bed. No nausea, vomiting or regurgitation after meals or dysphagia. No prior history of EGD. Has had negative serum testing for H pylori.     Denies any trouble with bowel habits. He has intermittent constipation and pain has been intermittent just below the umbilicus but this resolved. No rectal bleeding. There is no known family history of colon cancer, stomach cancer, esophageal cancer or colon polyps.    Review of Systems   Constitutional: Negative for chills, fatigue and fever.   HENT: Negative for congestion, sore throat and trouble swallowing.    Eyes: Negative.    Respiratory: Negative.    Cardiovascular: Negative for chest pain, palpitations and leg swelling.   Gastrointestinal: Negative for abdominal distention, abdominal pain, anal bleeding, blood in stool, constipation, diarrhea, nausea, rectal pain and vomiting.   Endocrine: Positive for cold intolerance and heat intolerance.   Genitourinary: Negative.    Musculoskeletal: Positive for arthralgias, back pain and myalgias.   Skin: Negative.    Allergic/Immunologic: Positive for environmental allergies.   Neurological:  "Negative for dizziness and light-headedness.   Hematological: Bruises/bleeds easily.   Psychiatric/Behavioral: The patient is nervous/anxious.      Specialty Problems        Gastroenterology Problems    Gastroesophageal reflux disease without esophagitis            Past Medical History:   Diagnosis Date   • Arm fracture    • Arthritis     knee and hands   • GERD (gastroesophageal reflux disease)    • History of migraine headaches      Past Surgical History:   Procedure Laterality Date   • MOUTH SURGERY       Family History   Problem Relation Age of Onset   • Osteoporosis Mother    • No Known Problems Father      Social History   Substance Use Topics   • Smoking status: Current Every Day Smoker     Packs/day: 1.00     Years: 15.00   • Smokeless tobacco: Never Used   • Alcohol use No       CURRENT MEDICATION:  •  albuterol (PROVENTIL HFA;VENTOLIN HFA) 108 (90 Base) MCG/ACT inhaler, Inhale 2 puffs Every 6 (Six) Hours As Needed for Wheezing., Disp: 1 inhaler, Rfl:  •  amitriptyline (ELAVIL) 25 MG tablet, Take 1 tablet by mouth Every Night., Disp: 30 tablet, Rfl: 5  •  Multiple Vitamin (MULTI VITAMIN DAILY) tablet, Take  by mouth., Disp: , Rfl:   •  pantoprazole (PROTONIX) 40 MG EC tablet, Take 1 tablet by mouth Daily., Disp: 30 tablet, Rfl: 6  •  rOPINIRole (REQUIP) 0.5 MG tablet, Take 1 tablet by mouth Every Night. Take 1 hour before bedtime., Disp: 30 tablet, Rfl: 1    ALLERGIES:  Patient has no known allergies.    VISIT VITALS:  /83   Pulse 91   Ht 182.9 cm (72\")   Wt 102 kg (225 lb)   SpO2 97%   BMI 30.52 kg/m²     Physical Exam   Constitutional: He is oriented to person, place, and time. He appears well-developed and well-nourished. No distress.   HENT:   Head: Normocephalic and atraumatic.   Nose: Nose normal.   Mouth/Throat: Oropharynx is clear and moist.   Eyes: Conjunctivae are normal. Right eye exhibits no discharge. Left eye exhibits no discharge. No scleral icterus.   Neck: Normal range of motion. " No JVD present.   Cardiovascular: Normal rate, regular rhythm and normal heart sounds.  Exam reveals no gallop and no friction rub.    No murmur heard.  Pulmonary/Chest: Effort normal and breath sounds normal. No respiratory distress. He has no wheezes. He has no rales. He exhibits no tenderness.   Abdominal: Soft. Bowel sounds are normal. He exhibits no mass. There is no tenderness.   Musculoskeletal: Normal range of motion. He exhibits no edema or deformity.   Neurological: He is alert and oriented to person, place, and time. Coordination normal.   Skin: Skin is warm and dry. No rash noted. He is not diaphoretic. No erythema.   Psychiatric: He has a normal mood and affect. His behavior is normal. Judgment and thought content normal.   Vitals reviewed.      Assessment/Plan     1. Gastroesophageal reflux disease, esophagitis presence not specified      Acid reflux is very severe and awakening him at night with symptoms. He will discontinue Protonix due to inefficacy and start taking Dexilant 60 mg once daily. EGD will be performed to further evaluate his complaints.     New Medications Ordered This Visit   Medications   • dexlansoprazole (DEXILANT) 60 MG capsule     Sig: Take 1 capsule by mouth Daily.     Dispense:  30 capsule     Refill:  5     Orders Placed This Encounter   Procedures   • Follow Anesthesia Guidelines / Standing Orders     ESOPHAGOGASTRODUODENOSCOPY WITH BIOPSY CPT CODE: 44860 (N/A)  He will need an esophagogastroduodenoscopy performed with IV general sedation. All of the risks, benefits and alternatives of this procedure have been discussed with him, all of his questions have been answered and he has elected to proceed. He should follow up in the office after this procedure to discuss the results and further recommendations can be made at that time.          Return for follow up after procedure to discuss results.        Electronically signed 9/7/2018 12:06 PM  Karen May PA-C, [facility]  [facility]

## 2018-09-24 LAB
LAB AP CASE REPORT: NORMAL
PATH REPORT.FINAL DX SPEC: NORMAL

## 2018-09-25 ENCOUNTER — TELEPHONE (OUTPATIENT)
Dept: GASTROENTEROLOGY | Facility: CLINIC | Age: 43
End: 2018-09-25

## 2018-09-25 RX ORDER — PANTOPRAZOLE SODIUM 40 MG/1
40 TABLET, DELAYED RELEASE ORAL 2 TIMES DAILY
Qty: 60 TABLET | Refills: 3 | Status: SHIPPED | OUTPATIENT
Start: 2018-09-25 | End: 2018-10-03

## 2018-09-25 NOTE — TELEPHONE ENCOUNTER
I spoke with patient and let him know that his co-pay for Dexilant was $219 and there was no PA that we can do. His insurance covers it but he just has a really high co-pay. I let him know that you were going to send the highest dose of Protonix in 40 Mg BID. I let him know to let us know if it didin't work and we would try something else.

## 2018-10-02 ENCOUNTER — TELEPHONE (OUTPATIENT)
Dept: GASTROENTEROLOGY | Facility: CLINIC | Age: 43
End: 2018-10-02

## 2018-10-02 NOTE — TELEPHONE ENCOUNTER
Also- please find out if [facility] can complete radiofrequency ablation for tx of Zapien's or if not, ck with  GI.

## 2018-10-02 NOTE — TELEPHONE ENCOUNTER
I reviewed results. Joi calling to see if patient can have sooner appt with Dr. Mccall to discuss. If not, ask Dr if ok for me to see patient today due to his concern.

## 2018-10-02 NOTE — TELEPHONE ENCOUNTER
I spoke with patient's wife and made Devan an appointment to be seen in office on 10-3-18 as they are worried about results. I spoke with Mariposa at Dr. Mccall's office and asked if Dr. Mccall would mind if Karen saw patient to tell him results. She stated it was ok.    I will call DARYL Walters.

## 2018-10-02 NOTE — TELEPHONE ENCOUNTER
Patient's wife called (Autumn) ke wanted to know what the results of Devan's EGD was? She said that she got a call from Mariposa ( for Dr. Mccall) and she wouldn't tell her anything. She said she is nervous. It looks like he was ordered to have a Esophogram to and missed that appointment on 10-1-18. She said they were never told he needed that. She just wanted to know if you could tell them something. They have an appointment for 10-10-18 to see Dr. Mccall but, don't want to wait.

## 2018-10-03 ENCOUNTER — DOCUMENTATION (OUTPATIENT)
Dept: GASTROENTEROLOGY | Facility: CLINIC | Age: 43
End: 2018-10-03

## 2018-10-03 ENCOUNTER — OFFICE VISIT (OUTPATIENT)
Dept: GASTROENTEROLOGY | Facility: CLINIC | Age: 43
End: 2018-10-03

## 2018-10-03 VITALS
HEART RATE: 89 BPM | WEIGHT: 226 LBS | DIASTOLIC BLOOD PRESSURE: 78 MMHG | HEIGHT: 74 IN | BODY MASS INDEX: 29 KG/M2 | SYSTOLIC BLOOD PRESSURE: 130 MMHG | OXYGEN SATURATION: 96 %

## 2018-10-03 DIAGNOSIS — K44.9 HIATAL HERNIA: ICD-10-CM

## 2018-10-03 DIAGNOSIS — K21.00 GASTROESOPHAGEAL REFLUX DISEASE WITH ESOPHAGITIS: Primary | ICD-10-CM

## 2018-10-03 DIAGNOSIS — K22.10 ULCER OF ESOPHAGUS WITHOUT BLEEDING: ICD-10-CM

## 2018-10-03 DIAGNOSIS — K22.711 BARRETT'S ESOPHAGUS WITH HIGH GRADE DYSPLASIA: ICD-10-CM

## 2018-10-03 DIAGNOSIS — K22.711 BARRETT'S ESOPHAGUS WITH HIGH GRADE DYSPLASIA: Primary | ICD-10-CM

## 2018-10-03 PROCEDURE — 99214 OFFICE O/P EST MOD 30 MIN: CPT | Performed by: PHYSICIAN ASSISTANT

## 2018-10-03 RX ORDER — RANITIDINE 300 MG/1
300 TABLET ORAL 2 TIMES DAILY
Qty: 60 TABLET | Refills: 5 | Status: SHIPPED | OUTPATIENT
Start: 2018-10-03 | End: 2019-04-12 | Stop reason: SDUPTHER

## 2018-10-03 NOTE — PROGRESS NOTES
: 1975    Chief Complaint   Patient presents with   • Heartburn       Devan Alarcon is a 42 y.o. male who presents to the office today as a follow up appointment regarding heartburn and recent procedure.     History of Present Illness:  He states that he is very concerned about his recent EGD and biopsy results. He knew that something serious would be found due to his very severe symptoms previously. He has been taking Dexilant 60 mg once daily (samples) since his last appointment and states that he has not awoke choking since started it. He is sleeping better and is able to eat some of the things that previously made him very sick. He was previously having very severe acid reflux even while taking Protonix 40 mg once daily. In the past, he has taken Carlyn-seltzer, TUMs, Rolaids, Pepcid, Zantac, Prilosec, Nexium, Prevacid without relief. He has noticed that they all seem to help some but never improve his symptoms completely. He has never taken Protonix twice daily. He has noticed that the Protonix helps to relieve the burning but the acid reflux still occurs. He was awakening with severe acid reflux and choking related to the acid for the past 20+ years. He sleeps sitting up in order to relieve the problem. He uses pillows to elevate his head in bed. No nausea, vomiting or regurgitation after meals or dysphagia. No prior history of EGD. Has had negative serum testing for H pylori. No alcohol use. He stopped smoking cigarettes and now uses pipe tobacco. There is no known family history of colon cancer, stomach cancer, esophageal cancer or colon polyps.    EGD was completed on 2018 By Dr. Mccall which revealed mild antral gastritis, small to moderate hiatal hernia, in the mid esophagus there was evidence of ulceration and reflux changes located at 27 cm.     Pathology:       Review of Systems   Constitutional: Negative for chills, fatigue and fever.   HENT: Negative for congestion, sore throat and trouble  swallowing.    Eyes: Negative.    Respiratory: Negative.    Cardiovascular: Negative for chest pain, palpitations and leg swelling.   Gastrointestinal: Negative for abdominal distention, abdominal pain, anal bleeding, blood in stool, constipation, diarrhea, nausea, rectal pain and vomiting.   Endocrine: Positive for cold intolerance and heat intolerance.   Genitourinary: Negative.    Musculoskeletal: Positive for arthralgias, back pain and myalgias.   Skin: Negative.    Allergic/Immunologic: Positive for environmental allergies.   Neurological: Negative for dizziness and light-headedness.   Hematological: Bruises/bleeds easily.   Psychiatric/Behavioral: The patient is nervous/anxious.        Past Medical History:   Diagnosis Date   • Arm fracture    • Arthritis     knee and hands   • Elevated cholesterol    • GERD (gastroesophageal reflux disease)    • History of migraine headaches        Past Surgical History:   Procedure Laterality Date   • ENDOSCOPY N/A 9/20/2018    Procedure: ESOPHAGOGASTRODUODENOSCOPY WITH BIOPSY CPT CODE: 96815;  Surgeon: Baron Mccall MD;  Location: [facility];  Service: Gastroenterology   • MOUTH SURGERY         Family History   Problem Relation Age of Onset   • Osteoporosis Mother    • No Known Problems Father        Social History     Social History   • Marital status: Single     Social History Main Topics   • Smoking status: Current Every Day Smoker     Packs/day: 0.00     Years: 25.00     Types: Pipe   • Smokeless tobacco: Never Used   • Alcohol use No   • Drug use: No     Other Topics Concern   • Not on file       Current Outpatient Prescriptions:   •  albuterol (PROVENTIL HFA;VENTOLIN HFA) 108 (90 Base) MCG/ACT inhaler, Inhale 2 puffs Every 6 (Six) Hours As Needed for Wheezing., Disp: 1 inhaler, Rfl: 1  •  amitriptyline (ELAVIL) 25 MG tablet, Take 1 tablet by mouth Every Night., Disp: 30 tablet, Rfl: 5  •  dexlansoprazole (DEXILANT) 60 MG capsule, Take 1 capsule by mouth Daily.,  "Disp: 30 capsule, Rfl: 5  •  Magnesium 250 MG tablet, Take 1 tablet by mouth Daily., Disp: , Rfl:   •  Multiple Vitamin (MULTI VITAMIN DAILY) tablet, Take  by mouth., Disp: , Rfl:   •  rOPINIRole (REQUIP) 1 MG tablet, Take 1 tablet by mouth Every Night. Take 1 hour before bedtime., Disp: 30 tablet, Rfl: 1    Allergies:   Patient has no known allergies.    Vitals:  /78 (BP Location: Right arm, Patient Position: Sitting, Cuff Size: Adult)   Pulse 89   Ht 188 cm (74\")   Wt 103 kg (226 lb)   SpO2 96%   BMI 29.02 kg/m²     Physical Exam   Constitutional: He is oriented to person, place, and time. He appears well-developed and well-nourished. No distress.   HENT:   Head: Normocephalic and atraumatic.   Nose: Nose normal.   Mouth/Throat: Oropharynx is clear and moist.   Eyes: Conjunctivae are normal. Right eye exhibits no discharge. Left eye exhibits no discharge. No scleral icterus.   Neck: Normal range of motion. No JVD present.   Cardiovascular: Normal rate, regular rhythm and normal heart sounds.  Exam reveals no gallop and no friction rub.    No murmur heard.  Pulmonary/Chest: Effort normal and breath sounds normal. No respiratory distress. He has no wheezes. He has no rales. He exhibits no tenderness.   Abdominal: Soft. Bowel sounds are normal. He exhibits no mass. There is no tenderness.   Musculoskeletal: Normal range of motion. He exhibits no edema or deformity.   Neurological: He is alert and oriented to person, place, and time. Coordination normal.   Skin: Skin is warm and dry. No rash noted. He is not diaphoretic. No erythema.   Psychiatric: He has a normal mood and affect. His behavior is normal. Judgment and thought content normal.   Vitals reviewed.      Assessment/Plan:  1. Gastroesophageal reflux disease with esophagitis    2. Ulcer of esophagus without bleeding    3. Zapien's esophagus with high grade dysplasia    4. Hiatal hernia      I have discussed his recent results with him and his wife. " He has a new diagnosis of Zapien's esophagus with high grade dysplasia with pathological findings concerning for adenocarcinoma. He needs more biopsies to determine if adenocarcinoma present but regardless, he does still have high grade dysplasia which requires quick therapy. He will be referred to Dr. Shilpa Riley who performed radiofrequency ablations for treatment of high grade dysplasia. He may need further treatments if confirmed adenocarcinoma is found. He will continue with Dexilant 60 mg once daily (given more samples) plus add Zantac 300 mg BID. If he runs out of Dexilant, he will resume Protonix 40 mg BID as previously directed. He does also have a hiatal hernia which was small to moderate on visualization with EGD. It was recommended by Dr. Mccall that he have esophagram which has not been completed yet. Patient states that he was told that due to smoking, he was not a candidate for GERD surgery. He was instructed not to lie down immediately after eating (wait at least 3 hours after meals), elevate the head of the bed at night, avoid spicy foods, avoid mints, avoid caffeine, avoid nicotine and work on getting to a healthy weight.     Orders Placed This Encounter   Procedures   • Follow Anesthesia Guidelines / Standing Orders     ESOPHAGOGASTRODUODENOSCOPY WITH BIOPSY CPT CODE: 63225 (N/A)  He will need a repeat esophagogastroduodenoscopy as soon as possible for more biopsies as per pathology report performed with IV general sedation. All of the risks, benefits and alternatives of this procedure have been discussed with him, all of his questions have been answered and he has elected to proceed. He should follow up in the office after this procedure to discuss the results and further recommendations can be made at that time.          Follow up with me will be dependent on his results.       Electronically signed 10/3/2018 2:39 PM  Karen May PA-C, [facility]

## 2018-10-03 NOTE — PROGRESS NOTES
As per Karen, patient needed a referral sent to Dr. Shilpa Riley in [facility]. I called her office and spoke with Ijeoma and she told me to fax all clinical notes and they will reach out to the patient within 2 days with an appointment. I told her patient was going t be seen in our clinic today and I will send Karen's note to them this afternoon.

## 2018-10-09 ENCOUNTER — TELEPHONE (OUTPATIENT)
Dept: SURGERY | Facility: CLINIC | Age: 43
End: 2018-10-09

## 2018-10-09 PROBLEM — K22.711 BARRETT'S ESOPHAGUS WITH HIGH GRADE DYSPLASIA: Status: ACTIVE | Noted: 2018-10-09

## 2018-10-09 PROBLEM — K22.10 ULCER OF ESOPHAGUS WITHOUT BLEEDING: Status: ACTIVE | Noted: 2018-10-09

## 2018-10-09 PROBLEM — K21.00 GASTROESOPHAGEAL REFLUX DISEASE WITH ESOPHAGITIS: Status: ACTIVE | Noted: 2018-10-09

## 2018-10-09 NOTE — TELEPHONE ENCOUNTER
Spoke with patients spouse regarding EGD. We have scheduled the date for 10/17/18. Patient was informed to be NPO after midnight and have a  present the day of surgery. Verbal understanding was addressed.     BC 10/9/18 @ 2:31pm

## 2018-10-16 ENCOUNTER — ANESTHESIA EVENT (OUTPATIENT)
Dept: PERIOP | Facility: HOSPITAL | Age: 43
End: 2018-10-16

## 2018-10-17 ENCOUNTER — ANESTHESIA (OUTPATIENT)
Dept: PERIOP | Facility: HOSPITAL | Age: 43
End: 2018-10-17

## 2018-10-17 ENCOUNTER — HOSPITAL ENCOUNTER (OUTPATIENT)
Facility: HOSPITAL | Age: 43
Setting detail: HOSPITAL OUTPATIENT SURGERY
Discharge: HOME | End: 2018-10-17
Attending: SURGERY | Admitting: SURGERY

## 2018-10-17 VITALS
RESPIRATION RATE: 18 BRPM | TEMPERATURE: 97.6 F | HEART RATE: 66 BPM | DIASTOLIC BLOOD PRESSURE: 81 MMHG | BODY MASS INDEX: 28.89 KG/M2 | OXYGEN SATURATION: 96 % | WEIGHT: 225 LBS | SYSTOLIC BLOOD PRESSURE: 120 MMHG

## 2018-10-17 DIAGNOSIS — K22.10 ULCER OF ESOPHAGUS WITHOUT BLEEDING: ICD-10-CM

## 2018-10-17 DIAGNOSIS — K22.711 BARRETT'S ESOPHAGUS WITH HIGH GRADE DYSPLASIA: ICD-10-CM

## 2018-10-17 DIAGNOSIS — K21.00 GASTROESOPHAGEAL REFLUX DISEASE WITH ESOPHAGITIS: ICD-10-CM

## 2018-10-17 PROCEDURE — 43239 EGD BIOPSY SINGLE/MULTIPLE: CPT | Performed by: SURGERY

## 2018-10-17 PROCEDURE — 25010000002 PROPOFOL 10 MG/ML EMULSION: Performed by: NURSE ANESTHETIST, CERTIFIED REGISTERED

## 2018-10-17 PROCEDURE — 25010000002 MIDAZOLAM PER 1 MG: Performed by: NURSE ANESTHETIST, CERTIFIED REGISTERED

## 2018-10-17 PROCEDURE — 25010000002 FENTANYL CITRATE (PF) 100 MCG/2ML SOLUTION: Performed by: NURSE ANESTHETIST, CERTIFIED REGISTERED

## 2018-10-17 RX ORDER — IPRATROPIUM BROMIDE AND ALBUTEROL SULFATE 2.5; .5 MG/3ML; MG/3ML
3 SOLUTION RESPIRATORY (INHALATION) ONCE AS NEEDED
Status: DISCONTINUED | OUTPATIENT
Start: 2018-10-17 | End: 2018-10-17 | Stop reason: HOSPADM

## 2018-10-17 RX ORDER — SODIUM CHLORIDE 0.9 % (FLUSH) 0.9 %
3 SYRINGE (ML) INJECTION EVERY 12 HOURS SCHEDULED
Status: DISCONTINUED | OUTPATIENT
Start: 2018-10-17 | End: 2018-10-17 | Stop reason: HOSPADM

## 2018-10-17 RX ORDER — FENTANYL CITRATE 50 UG/ML
50 INJECTION, SOLUTION INTRAMUSCULAR; INTRAVENOUS
Status: DISCONTINUED | OUTPATIENT
Start: 2018-10-17 | End: 2018-10-17 | Stop reason: HOSPADM

## 2018-10-17 RX ORDER — FENTANYL CITRATE 50 UG/ML
INJECTION, SOLUTION INTRAMUSCULAR; INTRAVENOUS AS NEEDED
Status: DISCONTINUED | OUTPATIENT
Start: 2018-10-17 | End: 2018-10-17 | Stop reason: SURG

## 2018-10-17 RX ORDER — LIDOCAINE HYDROCHLORIDE 20 MG/ML
INJECTION, SOLUTION INFILTRATION; PERINEURAL AS NEEDED
Status: DISCONTINUED | OUTPATIENT
Start: 2018-10-17 | End: 2018-10-17 | Stop reason: SURG

## 2018-10-17 RX ORDER — OXYCODONE HYDROCHLORIDE AND ACETAMINOPHEN 5; 325 MG/1; MG/1
1 TABLET ORAL ONCE AS NEEDED
Status: DISCONTINUED | OUTPATIENT
Start: 2018-10-17 | End: 2018-10-17 | Stop reason: HOSPADM

## 2018-10-17 RX ORDER — MIDAZOLAM HYDROCHLORIDE 1 MG/ML
INJECTION INTRAMUSCULAR; INTRAVENOUS AS NEEDED
Status: DISCONTINUED | OUTPATIENT
Start: 2018-10-17 | End: 2018-10-17 | Stop reason: SURG

## 2018-10-17 RX ORDER — SODIUM CHLORIDE 0.9 % (FLUSH) 0.9 %
3-10 SYRINGE (ML) INJECTION AS NEEDED
Status: DISCONTINUED | OUTPATIENT
Start: 2018-10-17 | End: 2018-10-17 | Stop reason: HOSPADM

## 2018-10-17 RX ORDER — SODIUM CHLORIDE, SODIUM LACTATE, POTASSIUM CHLORIDE, CALCIUM CHLORIDE 600; 310; 30; 20 MG/100ML; MG/100ML; MG/100ML; MG/100ML
125 INJECTION, SOLUTION INTRAVENOUS CONTINUOUS
Status: DISCONTINUED | OUTPATIENT
Start: 2018-10-17 | End: 2018-10-17 | Stop reason: SDUPTHER

## 2018-10-17 RX ORDER — SODIUM CHLORIDE, SODIUM LACTATE, POTASSIUM CHLORIDE, CALCIUM CHLORIDE 600; 310; 30; 20 MG/100ML; MG/100ML; MG/100ML; MG/100ML
125 INJECTION, SOLUTION INTRAVENOUS CONTINUOUS
Status: DISCONTINUED | OUTPATIENT
Start: 2018-10-17 | End: 2018-10-17 | Stop reason: HOSPADM

## 2018-10-17 RX ORDER — ONDANSETRON 2 MG/ML
4 INJECTION INTRAMUSCULAR; INTRAVENOUS ONCE AS NEEDED
Status: DISCONTINUED | OUTPATIENT
Start: 2018-10-17 | End: 2018-10-17 | Stop reason: HOSPADM

## 2018-10-17 RX ORDER — MEPERIDINE HYDROCHLORIDE 25 MG/ML
12.5 INJECTION INTRAMUSCULAR; INTRAVENOUS; SUBCUTANEOUS
Status: DISCONTINUED | OUTPATIENT
Start: 2018-10-17 | End: 2018-10-17 | Stop reason: HOSPADM

## 2018-10-17 RX ORDER — PROPOFOL 10 MG/ML
VIAL (ML) INTRAVENOUS AS NEEDED
Status: DISCONTINUED | OUTPATIENT
Start: 2018-10-17 | End: 2018-10-17 | Stop reason: SURG

## 2018-10-17 RX ADMIN — MIDAZOLAM HYDROCHLORIDE 2 MG: 1 INJECTION, SOLUTION INTRAMUSCULAR; INTRAVENOUS at 07:49

## 2018-10-17 RX ADMIN — PROPOFOL 50 MG: 10 INJECTION, EMULSION INTRAVENOUS at 07:51

## 2018-10-17 RX ADMIN — PROPOFOL 50 MG: 10 INJECTION, EMULSION INTRAVENOUS at 07:54

## 2018-10-17 RX ADMIN — LIDOCAINE HYDROCHLORIDE 40 MG: 20 INJECTION, SOLUTION INFILTRATION; PERINEURAL at 07:49

## 2018-10-17 RX ADMIN — FENTANYL CITRATE 100 MCG: 50 INJECTION INTRAMUSCULAR; INTRAVENOUS at 07:49

## 2018-10-17 RX ADMIN — PROPOFOL 50 MG: 10 INJECTION, EMULSION INTRAVENOUS at 07:57

## 2018-10-17 RX ADMIN — SODIUM CHLORIDE, POTASSIUM CHLORIDE, SODIUM LACTATE AND CALCIUM CHLORIDE: 600; 310; 30; 20 INJECTION, SOLUTION INTRAVENOUS at 07:49

## 2018-10-17 NOTE — H&P (VIEW-ONLY)
: 1975    Chief Complaint   Patient presents with   • Heartburn       Devan Alarcon is a 42 y.o. male who presents to the office today as a follow up appointment regarding heartburn and recent procedure.     History of Present Illness:  He states that he is very concerned about his recent EGD and biopsy results. He knew that something serious would be found due to his very severe symptoms previously. He has been taking Dexilant 60 mg once daily (samples) since his last appointment and states that he has not awoke choking since started it. He is sleeping better and is able to eat some of the things that previously made him very sick. He was previously having very severe acid reflux even while taking Protonix 40 mg once daily. In the past, he has taken Carlyn-seltzer, TUMs, Rolaids, Pepcid, Zantac, Prilosec, Nexium, Prevacid without relief. He has noticed that they all seem to help some but never improve his symptoms completely. He has never taken Protonix twice daily. He has noticed that the Protonix helps to relieve the burning but the acid reflux still occurs. He was awakening with severe acid reflux and choking related to the acid for the past 20+ years. He sleeps sitting up in order to relieve the problem. He uses pillows to elevate his head in bed. No nausea, vomiting or regurgitation after meals or dysphagia. No prior history of EGD. Has had negative serum testing for H pylori. No alcohol use. He stopped smoking cigarettes and now uses pipe tobacco. There is no known family history of colon cancer, stomach cancer, esophageal cancer or colon polyps.    EGD was completed on 2018 By Dr. Mccall which revealed mild antral gastritis, small to moderate hiatal hernia, in the mid esophagus there was evidence of ulceration and reflux changes located at 27 cm.     Pathology:       Review of Systems   Constitutional: Negative for chills, fatigue and fever.   HENT: Negative for congestion, sore throat and trouble  swallowing.    Eyes: Negative.    Respiratory: Negative.    Cardiovascular: Negative for chest pain, palpitations and leg swelling.   Gastrointestinal: Negative for abdominal distention, abdominal pain, anal bleeding, blood in stool, constipation, diarrhea, nausea, rectal pain and vomiting.   Endocrine: Positive for cold intolerance and heat intolerance.   Genitourinary: Negative.    Musculoskeletal: Positive for arthralgias, back pain and myalgias.   Skin: Negative.    Allergic/Immunologic: Positive for environmental allergies.   Neurological: Negative for dizziness and light-headedness.   Hematological: Bruises/bleeds easily.   Psychiatric/Behavioral: The patient is nervous/anxious.        Past Medical History:   Diagnosis Date   • Arm fracture    • Arthritis     knee and hands   • Elevated cholesterol    • GERD (gastroesophageal reflux disease)    • History of migraine headaches        Past Surgical History:   Procedure Laterality Date   • ENDOSCOPY N/A 9/20/2018    Procedure: ESOPHAGOGASTRODUODENOSCOPY WITH BIOPSY CPT CODE: 51493;  Surgeon: Baron Mccall MD;  Location: [facility];  Service: Gastroenterology   • MOUTH SURGERY         Family History   Problem Relation Age of Onset   • Osteoporosis Mother    • No Known Problems Father        Social History     Social History   • Marital status: Single     Social History Main Topics   • Smoking status: Current Every Day Smoker     Packs/day: 0.00     Years: 25.00     Types: Pipe   • Smokeless tobacco: Never Used   • Alcohol use No   • Drug use: No     Other Topics Concern   • Not on file       Current Outpatient Prescriptions:   •  albuterol (PROVENTIL HFA;VENTOLIN HFA) 108 (90 Base) MCG/ACT inhaler, Inhale 2 puffs Every 6 (Six) Hours As Needed for Wheezing., Disp: 1 inhaler, Rfl: 1  •  amitriptyline (ELAVIL) 25 MG tablet, Take 1 tablet by mouth Every Night., Disp: 30 tablet, Rfl: 5  •  dexlansoprazole (DEXILANT) 60 MG capsule, Take 1 capsule by mouth Daily.,  "Disp: 30 capsule, Rfl: 5  •  Magnesium 250 MG tablet, Take 1 tablet by mouth Daily., Disp: , Rfl:   •  Multiple Vitamin (MULTI VITAMIN DAILY) tablet, Take  by mouth., Disp: , Rfl:   •  rOPINIRole (REQUIP) 1 MG tablet, Take 1 tablet by mouth Every Night. Take 1 hour before bedtime., Disp: 30 tablet, Rfl: 1    Allergies:   Patient has no known allergies.    Vitals:  /78 (BP Location: Right arm, Patient Position: Sitting, Cuff Size: Adult)   Pulse 89   Ht 188 cm (74\")   Wt 103 kg (226 lb)   SpO2 96%   BMI 29.02 kg/m²     Physical Exam   Constitutional: He is oriented to person, place, and time. He appears well-developed and well-nourished. No distress.   HENT:   Head: Normocephalic and atraumatic.   Nose: Nose normal.   Mouth/Throat: Oropharynx is clear and moist.   Eyes: Conjunctivae are normal. Right eye exhibits no discharge. Left eye exhibits no discharge. No scleral icterus.   Neck: Normal range of motion. No JVD present.   Cardiovascular: Normal rate, regular rhythm and normal heart sounds.  Exam reveals no gallop and no friction rub.    No murmur heard.  Pulmonary/Chest: Effort normal and breath sounds normal. No respiratory distress. He has no wheezes. He has no rales. He exhibits no tenderness.   Abdominal: Soft. Bowel sounds are normal. He exhibits no mass. There is no tenderness.   Musculoskeletal: Normal range of motion. He exhibits no edema or deformity.   Neurological: He is alert and oriented to person, place, and time. Coordination normal.   Skin: Skin is warm and dry. No rash noted. He is not diaphoretic. No erythema.   Psychiatric: He has a normal mood and affect. His behavior is normal. Judgment and thought content normal.   Vitals reviewed.      Assessment/Plan:  1. Gastroesophageal reflux disease with esophagitis    2. Ulcer of esophagus without bleeding    3. Zapien's esophagus with high grade dysplasia    4. Hiatal hernia      I have discussed his recent results with him and his wife. " He has a new diagnosis of Zapien's esophagus with high grade dysplasia with pathological findings concerning for adenocarcinoma. He needs more biopsies to determine if adenocarcinoma present but regardless, he does still have high grade dysplasia which requires quick therapy. He will be referred to Dr. Shilpa Riley who performed radiofrequency ablations for treatment of high grade dysplasia. He may need further treatments if confirmed adenocarcinoma is found. He will continue with Dexilant 60 mg once daily (given more samples) plus add Zantac 300 mg BID. If he runs out of Dexilant, he will resume Protonix 40 mg BID as previously directed. He does also have a hiatal hernia which was small to moderate on visualization with EGD. It was recommended by Dr. Mccall that he have esophagram which has not been completed yet. Patient states that he was told that due to smoking, he was not a candidate for GERD surgery. He was instructed not to lie down immediately after eating (wait at least 3 hours after meals), elevate the head of the bed at night, avoid spicy foods, avoid mints, avoid caffeine, avoid nicotine and work on getting to a healthy weight.     Orders Placed This Encounter   Procedures   • Follow Anesthesia Guidelines / Standing Orders     ESOPHAGOGASTRODUODENOSCOPY WITH BIOPSY CPT CODE: 15115 (N/A)  He will need a repeat esophagogastroduodenoscopy as soon as possible for more biopsies as per pathology report performed with IV general sedation. All of the risks, benefits and alternatives of this procedure have been discussed with him, all of his questions have been answered and he has elected to proceed. He should follow up in the office after this procedure to discuss the results and further recommendations can be made at that time.          Follow up with me will be dependent on his results.       Electronically signed 10/3/2018 2:39 PM  Karen May PA-C, [facility]

## 2018-10-17 NOTE — ANESTHESIA PREPROCEDURE EVALUATION
Anesthesia Evaluation                  Airway   Mallampati: II  TM distance: >3 FB  Neck ROM: full  No difficulty expected  Dental - normal exam     Pulmonary - normal exam   Cardiovascular - normal exam    (+) hyperlipidemia,       Neuro/Psych  (+) headaches,     GI/Hepatic/Renal/Endo    (+)  GERD, GI bleeding,     Musculoskeletal     Abdominal  - normal exam    Bowel sounds: normal.   Substance History      OB/GYN          Other   (+) arthritis                     Anesthesia Plan    ASA 2     general     intravenous induction   Anesthetic plan, all risks, benefits, and alternatives have been provided, discussed and informed consent has been obtained with: patient.    Plan discussed with CRNA.

## 2018-10-17 NOTE — ANESTHESIA POSTPROCEDURE EVALUATION
Patient: Devan Alarcon    Procedure Summary     Date:  10/17/18 Room / Location:  [facility] OR  /  [facility] OR    Anesthesia Start:  0749 Anesthesia Stop:  0802    Procedure:  ESOPHAGOGASTRODUODENOSCOPY WITH BIOPSY CPT CODE: 25322 (N/A Esophagus) Diagnosis:       Ulcer of esophagus without bleeding      Zapien's esophagus with high grade dysplasia      Gastroesophageal reflux disease with esophagitis      (Ulcer of esophagus without bleeding [K22.10])      (Zapien's esophagus with high grade dysplasia [K22.711])      (Gastroesophageal reflux disease with esophagitis [K21.0])    Surgeon:  Baron Mccall MD Provider:  Yordan Baron DO    Anesthesia Type:  general ASA Status:  2          Anesthesia Type: general  Last vitals  BP   118/73 (10/17/18 0814)   Temp   97.6 °F (36.4 °C) (10/17/18 0804)   Pulse   64 (10/17/18 0814)   Resp   18 (10/17/18 0814)     SpO2   96 % (10/17/18 0814)     Post Anesthesia Care and Evaluation    Patient location during evaluation: PHASE II  Patient participation: complete - patient participated  Level of consciousness: awake and alert  Pain score: 1  Pain management: adequate  Airway patency: patent  Anesthetic complications: No anesthetic complications  PONV Status: controlled  Cardiovascular status: acceptable  Respiratory status: acceptable  Hydration status: acceptable

## 2018-10-17 NOTE — OP NOTE
ESOPHAGOGASTRODUODENOSCOPY WITH BIOPSY  Procedure Note    Devan Alarcon  10/17/2018    Pre-op Diagnosis:   Ulcer of esophagus without bleeding [K22.10]  Zapien's esophagus with high grade dysplasia [K22.711]  Gastroesophageal reflux disease with esophagitis [K21.0]    Post-op Diagnosis:   Same    Indications: See above Procedure(s):  ESOPHAGOGASTRODUODENOSCOPY WITH BIOPSY CPT CODE: 94761    Surgeon(s):  Baron Mccall MD    Anesthesia: General    Staff:   Circulator: Ana Baltazar RN  Scrub Person: Paula Tejada    Findings: Improved appearance of distal esophageal ulcerated area as well as gastritis.  Small to moderate hiatal hernia persists.      Operative Procedure: The patient was taken operating suite and placed in left lateral decubitus position.  Bilateral sequential compression devices were in place and IV anesthesia was administered.  Timeout procedure was performed.  The endoscope was inserted into the oropharynx and the esophagus was intubated.  The scope was advanced to the third portion of the duodenum.  The scope was slowly withdrawn evaluating all mucosal areas.  Antral gastritis had improved.  Retroflexion showed no evidence of mass or ulceration in this remaining gastric body however there was a small to moderate hiatal hernia present.  The scope was withdrawn to the GE junction which had some reflux changes with mild esophagitis.  The scope was slowly withdrawn and in the mid-distal esophagus at approximately 27 cm where there had previously been ulceration with high-grade dysplasia.  This area looked much improved though there was what appeared to be granulation tissue from healing.  Biopsy forceps were used to sample the area extensively.  The remainder the esophageal mucosa was within normal limits..  The remainder of the esophageal mucosa was within normal limits and the scope was removed and the patient awakened from anesthesia and taken to recovery.    Estimated Blood Loss:  minimal    Specimens:   Mid to distal esophageal biopsy                Drains: none    Grafts or Implants: none    Complications: none    Recommendations: Continue PPI therapy, smoking cessation, awaiting pathology to determine if ablation required.    Baron Mccall MD     Date: 10/17/2018  Time: 8:04 AM

## 2018-10-22 ENCOUNTER — TELEPHONE (OUTPATIENT)
Dept: GASTROENTEROLOGY | Facility: CLINIC | Age: 43
End: 2018-10-22

## 2018-10-22 LAB
LAB AP CASE REPORT: NORMAL
PATH REPORT.FINAL DX SPEC: NORMAL

## 2018-10-22 NOTE — TELEPHONE ENCOUNTER
Patient's wife called and they wanted to know the results of Devan's biopsy report was?    Please and Thank you

## 2018-10-22 NOTE — TELEPHONE ENCOUNTER
Spoke with patient's wife Autumn and told her results were not back yet and Karen would call her when they are

## 2018-10-22 NOTE — TELEPHONE ENCOUNTER
Biopsy has been resulted, it is pretty much the same as we discussed in the office. No definite invasive malignancy identified but findings concerning for adenocarcinoma. He needs appointment with Dr. Osvaldo WARD, if not with her then we will arrange with someone else.

## 2018-10-22 NOTE — TELEPHONE ENCOUNTER
Biopsy result not back yet, please let them know that I will keep checking and let them know when it is back

## 2018-10-23 NOTE — TELEPHONE ENCOUNTER
Karen, I just spoke with patient's wife and  told her biopsy results. She stated that they went yesterday and to [facility] and saw Dr. Marlee Walker (same office as Dr. Riley) and had the Ablation done. He is supposed to go back in 2 months to repeat. She said that he was put on protonix as well. I will call tomorrow and get note from their office.

## 2018-10-24 DIAGNOSIS — G25.81 RESTLESS LEG SYNDROME: ICD-10-CM

## 2018-10-24 RX ORDER — ROPINIROLE 1 MG/1
1 TABLET, FILM COATED ORAL NIGHTLY
Qty: 30 TABLET | Refills: 1 | Status: SHIPPED | OUTPATIENT
Start: 2018-10-24 | End: 2019-02-04 | Stop reason: SDUPTHER

## 2018-11-19 ENCOUNTER — OFFICE VISIT (OUTPATIENT)
Dept: FAMILY MEDICINE CLINIC | Facility: CLINIC | Age: 43
End: 2018-11-19

## 2018-11-19 VITALS
DIASTOLIC BLOOD PRESSURE: 81 MMHG | TEMPERATURE: 96.7 F | SYSTOLIC BLOOD PRESSURE: 131 MMHG | WEIGHT: 225 LBS | HEART RATE: 85 BPM | BODY MASS INDEX: 28.88 KG/M2 | HEIGHT: 74 IN

## 2018-11-19 DIAGNOSIS — Z23 NEED FOR VACCINATION AGAINST STREPTOCOCCUS PNEUMONIAE: ICD-10-CM

## 2018-11-19 DIAGNOSIS — C15.5 MALIGNANT NEOPLASM OF LOWER THIRD OF ESOPHAGUS (HCC): ICD-10-CM

## 2018-11-19 DIAGNOSIS — Z23 NEED FOR HEPATITIS A VACCINATION: ICD-10-CM

## 2018-11-19 DIAGNOSIS — F06.4 ANXIETY DISORDER DUE TO MEDICAL CONDITION: Primary | ICD-10-CM

## 2018-11-19 PROCEDURE — 90632 HEPA VACCINE ADULT IM: CPT | Performed by: FAMILY MEDICINE

## 2018-11-19 PROCEDURE — 99213 OFFICE O/P EST LOW 20 MIN: CPT | Performed by: FAMILY MEDICINE

## 2018-11-19 PROCEDURE — 90472 IMMUNIZATION ADMIN EACH ADD: CPT | Performed by: FAMILY MEDICINE

## 2018-11-19 PROCEDURE — 90471 IMMUNIZATION ADMIN: CPT | Performed by: FAMILY MEDICINE

## 2018-11-19 PROCEDURE — 90732 PPSV23 VACC 2 YRS+ SUBQ/IM: CPT | Performed by: FAMILY MEDICINE

## 2018-11-19 RX ORDER — PANTOPRAZOLE SODIUM 40 MG/1
TABLET, DELAYED RELEASE ORAL
Refills: 6 | COMMUNITY
Start: 2018-11-16 | End: 2019-02-04

## 2018-11-19 RX ORDER — LORAZEPAM 1 MG/1
1 TABLET ORAL 2 TIMES DAILY PRN
Qty: 24 TABLET
Start: 2018-11-19 | End: 2021-09-14

## 2018-11-19 NOTE — PROGRESS NOTES
Subjective   Devan Alarcon is a 42 y.o. male.     History of Present Illness follow-up.  Since here has been seen by general surgeon then gastroenterology.  Had EGD with now definitive diagnosis of stage I esophageal cancer.  Zapien's was attempted to ablate.  Has been seen by general surgery.  Has been seen by cardiothoracic surgery.  Plan is to proceed with total esophagectomy in about 2 weeks.  Understands all the risk and ramifications of that procedure.  Has also had pet imaging.  Will be undergoing colonoscopy also in regard to the area of hypermetabolic activity in sigmoid colon.  Is attempting to stop smoking completely.  Medications are as reconciled.  Denies fevers chills CP SOB palpitations urinary changes.  Still with episodic cough and some congestion.  Is off work now until released postoperatively in future.  Having some expected anxiety associated with new diagnosis and smoking cessation.    The following portions of the patient's history were reviewed and updated as appropriate: allergies, past family history, past medical history, past social history, past surgical history and problem list.    Review of Systems see the history of present illness    Objective   Physical Exam   Constitutional: He is oriented to person, place, and time. He appears well-developed and well-nourished.   HENT:   Head: Normocephalic.   Mouth/Throat: Oropharynx is clear and moist.   Eyes: Conjunctivae and EOM are normal. Pupils are equal, round, and reactive to light.   Neck: Normal range of motion. Neck supple. No tracheal deviation present. No thyromegaly present.   Cardiovascular: Normal rate, regular rhythm and normal heart sounds.   No murmur heard.  Pulmonary/Chest: Effort normal and breath sounds normal.   Musculoskeletal: He exhibits no edema.   Neurological: He is alert and oriented to person, place, and time.   Skin: Skin is warm and dry.   Psychiatric: He has a normal mood and affect.   Vitals  reviewed.      Assessment/Plan   Jack was seen today for uri and follow-up.    Diagnoses and all orders for this visit:    Anxiety disorder due to medical condition  -     LORazepam (ATIVAN) 1 MG tablet; Take 1 tablet by mouth 2 (Two) Times a Day As Needed for Anxiety.    Need for vaccination against Streptococcus pneumoniae  -     Pneumococcal Polysaccharide Vaccine 23-Valent Greater Than or Equal To 3yo Subcutaneous / IM    Need for hepatitis A vaccination  -     Hepatitis A Vaccine Adult (VAQTA) - Today  -     Hepatitis A Vaccine Adult  (VAQTA) - Dose 2; Future    Malignant neoplasm of lower third of esophagus (CMS/HCC)  -     LORazepam (ATIVAN) 1 MG tablet; Take 1 tablet by mouth 2 (Two) Times a Day As Needed for Anxiety.     will make available low-dose anxiolytic.  Smoking cessation must occur.  Counseled regarding vaccines.  After consent administered pneumococcal 23.  After consent administered first hepatitis A vaccine.  You had your flu shot.  Try to build up nutrition.  Stay safely active.  Keep follow-ups of course.  Recheck as needed.  Counseled regarding utilization storage of the Ativan.  Rasheed pending.    Patient's Body mass index is 28.89 kg/m². BMI is above normal parameters. Recommendations include: exercise counseling.

## 2018-12-11 ENCOUNTER — TRANSITIONAL CARE MANAGEMENT TELEPHONE ENCOUNTER (OUTPATIENT)
Dept: FAMILY MEDICINE CLINIC | Facility: CLINIC | Age: 43
End: 2018-12-11

## 2018-12-11 NOTE — OUTREACH NOTE
DOMENIC call completed.  Please refer to TCM call flowsheet for call documentation.  Patient is sleeping.  Sammie reports that patient is fine. Conversation ended quickly by  Vázquez.  This was due to LILY did not upload until after conversation and I could not find one from your practice so I was not able to share appointment date and time.

## 2018-12-17 ENCOUNTER — OFFICE VISIT (OUTPATIENT)
Dept: FAMILY MEDICINE CLINIC | Facility: CLINIC | Age: 43
End: 2018-12-17

## 2018-12-17 VITALS
TEMPERATURE: 97.5 F | WEIGHT: 204.1 LBS | HEIGHT: 74 IN | DIASTOLIC BLOOD PRESSURE: 78 MMHG | HEART RATE: 107 BPM | SYSTOLIC BLOOD PRESSURE: 130 MMHG | BODY MASS INDEX: 26.19 KG/M2

## 2018-12-17 DIAGNOSIS — Z98.890 HISTORY OF ESOPHAGECTOMY: ICD-10-CM

## 2018-12-17 DIAGNOSIS — C15.5 MALIGNANT NEOPLASM OF LOWER THIRD OF ESOPHAGUS (HCC): Primary | ICD-10-CM

## 2018-12-17 DIAGNOSIS — Z90.49 HISTORY OF ESOPHAGECTOMY: ICD-10-CM

## 2018-12-17 PROCEDURE — 99214 OFFICE O/P EST MOD 30 MIN: CPT | Performed by: FAMILY MEDICINE

## 2018-12-17 RX ORDER — RANITIDINE 150 MG/1
CAPSULE ORAL
Refills: 0 | COMMUNITY
Start: 2018-12-11 | End: 2019-01-25 | Stop reason: SDUPTHER

## 2018-12-17 RX ORDER — HYDROCODONE BITARTRATE AND ACETAMINOPHEN 7.5; 325 MG/1; MG/1
7.5-325 TABLET ORAL EVERY 6 HOURS PRN
Refills: 0 | COMMUNITY
Start: 2018-12-08 | End: 2018-12-17 | Stop reason: DRUGHIGH

## 2018-12-17 RX ORDER — HYDROCODONE BITARTRATE AND ACETAMINOPHEN 5; 325 MG/1; MG/1
1 TABLET ORAL EVERY 6 HOURS PRN
Qty: 8 TABLET | Refills: 0
Start: 2018-12-17 | End: 2019-08-27

## 2018-12-17 NOTE — PROGRESS NOTES
Subjective   Devan Alarcon is a 43 y.o. male.     History of Present Illness follow-up from recent hospitalization in [place] underwent total esophagectomy secondary to adenocarcinoma.  Has the G-tube still and uses it for flushing only.  Has visited with surgeon.  Because of one positive lymph node will be visiting with oncology today.  Generally doing fair reportedly.  Appetite is minimal.  Denies fever chills CP SOB cough nausea vomiting diarrhea urinary issues.  Medications are as reconciled.  The currently prescribed hydrocodone is too strong even taking one half.  Has used it very minimal.  Has been unable to stop smoking.  Has been able to advance diet some per recommendations.    The following portions of the patient's history were reviewed and updated as appropriate: allergies, current medications, past family history, past social history, past surgical history and problem list.    Review of Systems the the history of present illness    Objective   Physical Exam   Constitutional: He is oriented to person, place, and time. He appears well-developed and well-nourished.   HENT:   Head: Normocephalic.   Mouth/Throat: Oropharynx is clear and moist.   Eyes: Conjunctivae and EOM are normal. Pupils are equal, round, and reactive to light.   Neck: Normal range of motion. Neck supple. No tracheal deviation present. No thyromegaly present.   Cardiovascular: Normal rate, regular rhythm and normal heart sounds.   No murmur heard.  Pulmonary/Chest: Effort normal and breath sounds normal.   Musculoskeletal: He exhibits no edema.   Neurological: He is alert and oriented to person, place, and time.   Skin: Skin is warm and dry.   Healing scar left anterior cervical region.  Scar anterior abdominal wall.   Psychiatric: He has a normal mood and affect.   Vitals reviewed.      Assessment/Plan   Devan was seen today for follow-up.    Diagnoses and all orders for this visit:    Malignant neoplasm of lower third of esophagus  (CMS/[facility])  -     HYDROcodone-acetaminophen (NORCO) 5-325 MG per tablet; Take 1 tablet by mouth Every 6 (Six) Hours As Needed for Moderate Pain .    History of esophagectomy  -     HYDROcodone-acetaminophen (NORCO) 5-325 MG per tablet; Take 1 tablet by mouth Every 6 (Six) Hours As Needed for Moderate Pain .     Counseled.  Reviewed records from outside hospital.  In regards to the pain management ordered lower dose of hydrocodone.  I believe the continued utilization at definitely lower dose can allow you to continue to be functional and improved.  No lab by me today.  Recheck here in one month or as needed.  Stay safely active.  Continue to liberalize diet as per surgical recommendations.  DEONTE reviewed  Current outpatient and discharge medications have been reconciled for the patient.  Reviewed by: Jaswinder Zheng MD    Patient's Body mass index is 26.19 kg/m². BMI is within normal parameters. No follow-up required.

## 2019-01-25 ENCOUNTER — OFFICE VISIT (OUTPATIENT)
Dept: CARDIOLOGY | Facility: CLINIC | Age: 44
End: 2019-01-25

## 2019-01-25 VITALS
HEIGHT: 74 IN | WEIGHT: 203.8 LBS | RESPIRATION RATE: 16 BRPM | BODY MASS INDEX: 26.15 KG/M2 | SYSTOLIC BLOOD PRESSURE: 115 MMHG | DIASTOLIC BLOOD PRESSURE: 70 MMHG | HEART RATE: 79 BPM

## 2019-01-25 DIAGNOSIS — I48.0 PAROXYSMAL ATRIAL FIBRILLATION (HCC): Primary | ICD-10-CM

## 2019-01-25 PROCEDURE — 93000 ELECTROCARDIOGRAM COMPLETE: CPT | Performed by: INTERNAL MEDICINE

## 2019-01-25 PROCEDURE — 99204 OFFICE O/P NEW MOD 45 MIN: CPT | Performed by: INTERNAL MEDICINE

## 2019-01-25 RX ORDER — ONDANSETRON 4 MG/1
4 TABLET, FILM COATED ORAL AS NEEDED
COMMUNITY
End: 2019-08-27 | Stop reason: SDUPTHER

## 2019-01-25 NOTE — PROGRESS NOTES
Jaswinder Zheng MD  Devan Alarcon  1975 01/25/2019    Patient Active Problem List   Diagnosis   • Encounter for long-term (current) use of medications   • Chronic headache   • Hyperlipidemia   • Gastroesophageal reflux disease   • Ulcer of esophagus without bleeding   • Zapien's esophagus with high grade dysplasia   • Gastroesophageal reflux disease with esophagitis   • Malignant neoplasm of lower third of esophagus (CMS/HCC)   • History of esophagectomy   • Paroxysmal atrial fibrillation (CMS/HCC)       Dear Jaswinder Zheng MD:    Subjective     Devan Alarcon is a 43 y.o. male with the problems as listed above, presents    Chief complaint: Recent history of atrial fibrillation.    History of Present Illness: Mr. Alarcon is a pleasant 43-year-old  male with recent history of atrial fibrillation that he developed after recent surgery where he had esophagectomy on November 30, 2018.  Patient apparently converted back to sinus rhythm on IV diltiazem.  He has been referred here for follow-up for any recurrence of atrial fibrillation.  On further questioning he denies any complaints of palpitations, dizziness or syncope.  He denies any chest pains or shortness of breath.  He is nondiabetic and nonhypertensive.  He has no history of known coronary artery disease or structural heart disease.  He admits to smoking pipe once a day.      No Known Allergies:      Current Outpatient Medications:   •  amitriptyline (ELAVIL) 25 MG tablet, Take 1 tablet by mouth Every Night., Disp: 30 tablet, Rfl: 5  •  dexlansoprazole (DEXILANT) 60 MG capsule, Take 1 capsule by mouth Daily., Disp: 30 capsule, Rfl: 5  •  HYDROcodone-acetaminophen (NORCO) 5-325 MG per tablet, Take 1 tablet by mouth Every 6 (Six) Hours As Needed for Moderate Pain ., Disp: 8 tablet, Rfl: 0  •  LORazepam (ATIVAN) 1 MG tablet, Take 1 tablet by mouth 2 (Two) Times a Day As Needed for Anxiety., Disp: 24 tablet, Rfl:   •  ondansetron  (ZOFRAN) 4 MG tablet, Take 4 mg by mouth As Needed for Nausea or Vomiting., Disp: , Rfl:   •  raNITIdine (ZANTAC) 300 MG tablet, Take 1 tablet by mouth 2 (Two) Times a Day., Disp: 60 tablet, Rfl: 5  •  rOPINIRole (REQUIP) 1 MG tablet, Take 1 tablet by mouth Every Night. Take 1 hour before bedtime., Disp: 30 tablet, Rfl: 1  •  albuterol (PROVENTIL HFA;VENTOLIN HFA) 108 (90 Base) MCG/ACT inhaler, Inhale 2 puffs Every 6 (Six) Hours As Needed for Wheezing., Disp: 1 inhaler, Rfl: 1  •  Magnesium 250 MG tablet, Take 1 tablet by mouth Daily., Disp: , Rfl:   •  Multiple Vitamin (MULTI VITAMIN DAILY) tablet, Take  by mouth., Disp: , Rfl:   •  pantoprazole (PROTONIX) 40 MG EC tablet, TAKE ONE TABLET BY MOUTH EVERY DAY FOR THE STOMACH, Disp: , Rfl: 6    Past Medical History:   Diagnosis Date   • Arm fracture    • Arthritis     knee and hands   • Elevated cholesterol    • Esophageal cancer (CMS/HCC)    • GERD (gastroesophageal reflux disease)    • History of migraine headaches      Past Surgical History:   Procedure Laterality Date   • ENDOSCOPY N/A 9/20/2018    Procedure: ESOPHAGOGASTRODUODENOSCOPY WITH BIOPSY CPT CODE: 02996;  Surgeon: Baron Mccall MD;  Location: [facility];  Service: Gastroenterology   • ENDOSCOPY N/A 10/17/2018    Procedure: ESOPHAGOGASTRODUODENOSCOPY WITH BIOPSY CPT CODE: 42426;  Surgeon: Baron Mccall MD;  Location: [facility];  Service: Gastroenterology   • ENDOSCOPY      with ablation   • ESOPHAGECTOMY     • MOUTH SURGERY       Family History   Problem Relation Age of Onset   • Osteoporosis Mother    • No Known Problems Father      Social History     Tobacco Use   • Smoking status: Former Smoker     Packs/day: 0.00     Years: 25.00     Pack years: 0.00     Types: Pipe   • Smokeless tobacco: Never Used   Substance Use Topics   • Alcohol use: No   • Drug use: No       Review of Systems   Constitution: Positive for decreased appetite. Negative for chills, diaphoresis and fever.   HENT:  "Positive for hearing loss.    Cardiovascular: Negative for chest pain, leg swelling, orthopnea, palpitations and paroxysmal nocturnal dyspnea.   Respiratory: Negative for cough, hemoptysis and shortness of breath.    Endocrine: Negative for cold intolerance and heat intolerance.   Hematologic/Lymphatic: Does not bruise/bleed easily.   Skin: Negative for rash.   Musculoskeletal: Positive for back pain, joint pain, joint swelling, muscle cramps and stiffness. Negative for myalgias.   Gastrointestinal: Positive for abdominal pain and diarrhea. Negative for constipation, nausea and vomiting.   Genitourinary: Negative for dysuria and hematuria.   Neurological: Negative for dizziness, focal weakness and numbness.   Psychiatric/Behavioral: Positive for depression. The patient is nervous/anxious.        Objective   Blood pressure 115/70, pulse 79, resp. rate 16, height 188 cm (74.02\"), weight 92.4 kg (203 lb 12.8 oz).  Body mass index is 26.16 kg/m².      Physical Exam   Constitutional: He is oriented to person, place, and time. He appears well-developed and well-nourished.   HENT:   Mouth/Throat: Oropharynx is clear and moist.   Eyes: EOM are normal. Pupils are equal, round, and reactive to light.   Neck: Neck supple. No JVD present. No tracheal deviation present. No thyromegaly present.   Cardiovascular: Normal rate, regular rhythm, S1 normal and S2 normal. Exam reveals no gallop, no S3, no S4 and no friction rub.   No murmur heard.  Pulmonary/Chest: Effort normal and breath sounds normal.   Abdominal: Soft. Bowel sounds are normal. He exhibits no mass. There is no tenderness.   As gastric feeding tube in place.   Musculoskeletal: Normal range of motion. He exhibits no edema.   Lymphadenopathy:     He has no cervical adenopathy.   Neurological: He is alert and oriented to person, place, and time.   Skin: Skin is warm and dry. No rash noted.   Psychiatric: He has a normal mood and affect.       Lab Results   Component " Value Date     08/27/2018    K 4.5 08/27/2018     08/27/2018    CO2 28.1 08/27/2018    BUN 15 08/27/2018    CREATININE 1.05 08/27/2018    GLUCOSE 89 08/27/2018    CALCIUM 10.9 (H) 08/27/2018    AST 28 08/27/2018    ALT 29 08/27/2018    ALKPHOS 89 08/27/2018    LABIL2 1.3 (L) 05/26/2016     No results found for: CKTOTAL  Lab Results   Component Value Date    WBC 6.74 08/27/2018    HGB 12.6 (L) 08/27/2018    HCT 40.8 (L) 08/27/2018     08/27/2018     No results found for: INR  No results found for: MG  Lab Results   Component Value Date    CHLPL 231 (H) 05/26/2016    TRIG 292 (H) 01/24/2017    HDL 35 (L) 01/24/2017     (H) 01/24/2017          ECG 12 Lead  Date/Time: 1/25/2019 10:11 AM  Performed by: Damián Montesinos MD  Authorized by: Damián Montesinos MD   Rhythm: sinus rhythm  Conduction: conduction normal  ST Segments: ST segments normal  Other: no other findings  Clinical impression: normal ECG          Assessment/Plan    Diagnosis Plan   1. Paroxysmal atrial fibrillation patient is back in sinus rhythm.  He has been asymptomatic.   Adult Transthoracic Echo Complete W/ Cont if Necessary Per Protocol          Recommendations:    Orders Placed This Encounter   Procedures   • ECG 12 Lead   • Adult Transthoracic Echo Complete W/ Cont if Necessary Per Protocol        1. Since patient's chadsvasc score is low (0), he would not need any chronic oral anticoagulation.  2. Will obtain an echo Doppler study to rule out any structural heart disease causing his atrial fibrillation.   3. If he has any recurrent atrial fibrillation, then we'll consider antiarrhythmic therapy.    Return in about 6 months (around 7/25/2019).    As always, Jaswinder Zheng MD  I appreciate very much the opportunity to participate in the cardiovascular care of your patients. Please do not hesitate to call me with any questions with regards to Devan Alarcon evaluation and management.       With Best  Regards,        Damián Montesinos MD, [facility]    Dragon disclaimer:  Much of this encounter note is an electronic transcription/translation of spoken language to printed text. The electronic translation of spoken language may permit erroneous, or at times, nonsensical words or phrases to be inadvertently transcribed; Although I have reviewed the note for such errors, some may still exist.

## 2019-01-31 ENCOUNTER — TELEPHONE (OUTPATIENT)
Dept: GASTROENTEROLOGY | Facility: CLINIC | Age: 44
End: 2019-01-31

## 2019-02-01 ENCOUNTER — HOSPITAL ENCOUNTER (OUTPATIENT)
Dept: CARDIOLOGY | Facility: HOSPITAL | Age: 44
Discharge: HOME | End: 2019-02-01
Attending: INTERNAL MEDICINE | Admitting: INTERNAL MEDICINE

## 2019-02-01 DIAGNOSIS — I48.0 PAROXYSMAL ATRIAL FIBRILLATION (HCC): ICD-10-CM

## 2019-02-01 PROCEDURE — 93306 TTE W/DOPPLER COMPLETE: CPT | Performed by: INTERNAL MEDICINE

## 2019-02-01 PROCEDURE — 93306 TTE W/DOPPLER COMPLETE: CPT

## 2019-02-03 LAB
BH CV ECHO MEAS - % IVS THICK: 7.1 %
BH CV ECHO MEAS - % LVPW THICK: 34.2 %
BH CV ECHO MEAS - ACS: 2.5 CM
BH CV ECHO MEAS - AO MAX PG: 7.2 MMHG
BH CV ECHO MEAS - AO MEAN PG: 3.4 MMHG
BH CV ECHO MEAS - AO ROOT AREA (BSA CORRECTED): 1.7
BH CV ECHO MEAS - AO ROOT AREA: 10.7 CM^2
BH CV ECHO MEAS - AO ROOT DIAM: 3.7 CM
BH CV ECHO MEAS - AO V2 MAX: 134.4 CM/SEC
BH CV ECHO MEAS - AO V2 MEAN: 82.1 CM/SEC
BH CV ECHO MEAS - AO V2 VTI: 25.4 CM
BH CV ECHO MEAS - BSA(HAYCOCK): 2.2 M^2
BH CV ECHO MEAS - BSA: 2.2 M^2
BH CV ECHO MEAS - BZI_BMI: 26.1 KILOGRAMS/M^2
BH CV ECHO MEAS - BZI_METRIC_HEIGHT: 188 CM
BH CV ECHO MEAS - BZI_METRIC_WEIGHT: 92.1 KG
BH CV ECHO MEAS - EDV(CUBED): 79.3 ML
BH CV ECHO MEAS - EDV(MOD-SP4): 80 ML
BH CV ECHO MEAS - EDV(TEICH): 82.9 ML
BH CV ECHO MEAS - EF(CUBED): 62 %
BH CV ECHO MEAS - EF(MOD-SP4): 65 %
BH CV ECHO MEAS - EF(TEICH): 53.8 %
BH CV ECHO MEAS - ESV(CUBED): 30.1 ML
BH CV ECHO MEAS - ESV(MOD-SP4): 28 ML
BH CV ECHO MEAS - ESV(TEICH): 38.3 ML
BH CV ECHO MEAS - FS: 27.6 %
BH CV ECHO MEAS - IVS/LVPW: 1.1
BH CV ECHO MEAS - IVSD: 1.2 CM
BH CV ECHO MEAS - IVSS: 1.3 CM
BH CV ECHO MEAS - LA DIMENSION: 3.1 CM
BH CV ECHO MEAS - LA/AO: 0.85
BH CV ECHO MEAS - LV DIASTOLIC VOL/BSA (35-75): 36.6 ML/M^2
BH CV ECHO MEAS - LV MASS(C)D: 182.8 GRAMS
BH CV ECHO MEAS - LV MASS(C)DI: 83.6 GRAMS/M^2
BH CV ECHO MEAS - LV MASS(C)S: 153 GRAMS
BH CV ECHO MEAS - LV MASS(C)SI: 70 GRAMS/M^2
BH CV ECHO MEAS - LV SYSTOLIC VOL/BSA (12-30): 12.8 ML/M^2
BH CV ECHO MEAS - LVIDD: 4.3 CM
BH CV ECHO MEAS - LVIDS: 3.1 CM
BH CV ECHO MEAS - LVLD AP4: 7.8 CM
BH CV ECHO MEAS - LVLS AP4: 6.2 CM
BH CV ECHO MEAS - LVOT AREA (M): 5.7 CM^2
BH CV ECHO MEAS - LVOT AREA: 5.9 CM^2
BH CV ECHO MEAS - LVOT DIAM: 2.7 CM
BH CV ECHO MEAS - LVPWD: 1.1 CM
BH CV ECHO MEAS - LVPWS: 1.5 CM
BH CV ECHO MEAS - MV A MAX VEL: 70.1 CM/SEC
BH CV ECHO MEAS - MV E MAX VEL: 73.1 CM/SEC
BH CV ECHO MEAS - MV E/A: 1
BH CV ECHO MEAS - PA ACC SLOPE: 1231 CM/SEC^2
BH CV ECHO MEAS - PA ACC TIME: 0.11 SEC
BH CV ECHO MEAS - PA PR(ACCEL): 29.9 MMHG
BH CV ECHO MEAS - RVDD: 1.4 CM
BH CV ECHO MEAS - SI(AO): 124.5 ML/M^2
BH CV ECHO MEAS - SI(CUBED): 22.5 ML/M^2
BH CV ECHO MEAS - SI(MOD-SP4): 23.8 ML/M^2
BH CV ECHO MEAS - SI(TEICH): 20.4 ML/M^2
BH CV ECHO MEAS - SV(AO): 272.4 ML
BH CV ECHO MEAS - SV(CUBED): 49.2 ML
BH CV ECHO MEAS - SV(MOD-SP4): 52 ML
BH CV ECHO MEAS - SV(TEICH): 44.6 ML
MAXIMAL PREDICTED HEART RATE: 176 BPM
STRESS TARGET HR: 150 BPM

## 2019-02-04 ENCOUNTER — OFFICE VISIT (OUTPATIENT)
Dept: FAMILY MEDICINE CLINIC | Facility: CLINIC | Age: 44
End: 2019-02-04

## 2019-02-04 VITALS
SYSTOLIC BLOOD PRESSURE: 130 MMHG | BODY MASS INDEX: 25.57 KG/M2 | WEIGHT: 199.2 LBS | TEMPERATURE: 97 F | HEIGHT: 74 IN | HEART RATE: 92 BPM | DIASTOLIC BLOOD PRESSURE: 80 MMHG

## 2019-02-04 DIAGNOSIS — Z90.49 HISTORY OF ESOPHAGECTOMY: Primary | ICD-10-CM

## 2019-02-04 DIAGNOSIS — Z98.890 HISTORY OF ESOPHAGECTOMY: Primary | ICD-10-CM

## 2019-02-04 DIAGNOSIS — G25.81 RESTLESS LEG SYNDROME: ICD-10-CM

## 2019-02-04 LAB
ALBUMIN SERPL-MCNC: 4.2 G/DL (ref 3.5–5)
ALBUMIN/GLOB SERPL: 1.1 G/DL (ref 1.5–2.5)
ALP SERPL-CCNC: 82 U/L (ref 40–129)
ALT SERPL W P-5'-P-CCNC: 16 U/L (ref 10–44)
ANION GAP SERPL CALCULATED.3IONS-SCNC: 4.5 MMOL/L (ref 3.6–11.2)
ANISOCYTOSIS BLD QL: NORMAL
AST SERPL-CCNC: 18 U/L (ref 10–34)
BASOPHILS # BLD AUTO: 0.02 10*3/MM3 (ref 0–0.3)
BASOPHILS NFR BLD AUTO: 0.3 % (ref 0–2)
BILIRUB SERPL-MCNC: 0.4 MG/DL (ref 0.2–1.8)
BUN BLD-MCNC: 11 MG/DL (ref 7–21)
BUN/CREAT SERPL: 13.3 (ref 7–25)
CALCIUM SPEC-SCNC: 9.2 MG/DL (ref 7.7–10)
CHLORIDE SERPL-SCNC: 108 MMOL/L (ref 99–112)
CO2 SERPL-SCNC: 27.5 MMOL/L (ref 24.3–31.9)
CREAT BLD-MCNC: 0.83 MG/DL (ref 0.43–1.29)
DEPRECATED RDW RBC AUTO: 48.3 FL (ref 37–54)
EOSINOPHIL # BLD AUTO: 0.14 10*3/MM3 (ref 0–0.7)
EOSINOPHIL NFR BLD AUTO: 1.8 % (ref 0–5)
ERYTHROCYTE [DISTWIDTH] IN BLOOD BY AUTOMATED COUNT: 18.7 % (ref 11.5–14.5)
GFR SERPL CREATININE-BSD FRML MDRD: 101 ML/MIN/1.73
GLOBULIN UR ELPH-MCNC: 3.7 GM/DL
GLUCOSE BLD-MCNC: 84 MG/DL (ref 70–110)
HCT VFR BLD AUTO: 34.2 % (ref 42–52)
HGB BLD-MCNC: 9.8 G/DL (ref 14–18)
HYPOCHROMIA BLD QL: NORMAL
IMM GRANULOCYTES # BLD AUTO: 0 10*3/MM3 (ref 0–0.03)
IMM GRANULOCYTES NFR BLD AUTO: 0 % (ref 0–0.5)
LYMPHOCYTES # BLD AUTO: 2.37 10*3/MM3 (ref 1–3)
LYMPHOCYTES NFR BLD AUTO: 30.8 % (ref 21–51)
MCH RBC QN AUTO: 21 PG (ref 27–33)
MCHC RBC AUTO-ENTMCNC: 28.7 G/DL (ref 33–37)
MCV RBC AUTO: 73.4 FL (ref 80–94)
MICROCYTES BLD QL: NORMAL
MONOCYTES # BLD AUTO: 0.61 10*3/MM3 (ref 0.1–0.9)
MONOCYTES NFR BLD AUTO: 7.9 % (ref 0–10)
NEUTROPHILS # BLD AUTO: 4.56 10*3/MM3 (ref 1.4–6.5)
NEUTROPHILS NFR BLD AUTO: 59.2 % (ref 30–70)
OSMOLALITY SERPL CALC.SUM OF ELEC: 278 MOSM/KG (ref 273–305)
PLATELET # BLD AUTO: 517 10*3/MM3 (ref 130–400)
PMV BLD AUTO: 9.8 FL (ref 6–10)
POTASSIUM BLD-SCNC: 3.9 MMOL/L (ref 3.5–5.3)
PROT SERPL-MCNC: 7.9 G/DL (ref 6–8)
RBC # BLD AUTO: 4.66 10*6/MM3 (ref 4.7–6.1)
SMALL PLATELETS BLD QL SMEAR: NORMAL
SODIUM BLD-SCNC: 140 MMOL/L (ref 135–153)
WBC NRBC COR # BLD: 7.7 10*3/MM3 (ref 4.5–12.5)

## 2019-02-04 PROCEDURE — 99214 OFFICE O/P EST MOD 30 MIN: CPT | Performed by: FAMILY MEDICINE

## 2019-02-04 PROCEDURE — 85025 COMPLETE CBC W/AUTO DIFF WBC: CPT | Performed by: FAMILY MEDICINE

## 2019-02-04 PROCEDURE — 80053 COMPREHEN METABOLIC PANEL: CPT | Performed by: FAMILY MEDICINE

## 2019-02-04 PROCEDURE — 85007 BL SMEAR W/DIFF WBC COUNT: CPT | Performed by: FAMILY MEDICINE

## 2019-02-04 PROCEDURE — 36415 COLL VENOUS BLD VENIPUNCTURE: CPT | Performed by: FAMILY MEDICINE

## 2019-02-04 RX ORDER — AMITRIPTYLINE HYDROCHLORIDE 25 MG/1
25 TABLET, FILM COATED ORAL NIGHTLY
Qty: 30 TABLET | Refills: 5 | Status: CANCELLED | OUTPATIENT
Start: 2019-02-04

## 2019-02-04 RX ORDER — ROPINIROLE 1 MG/1
1 TABLET, FILM COATED ORAL 2 TIMES DAILY
Qty: 60 TABLET | Refills: 3 | Status: SHIPPED | OUTPATIENT
Start: 2019-02-04 | End: 2019-09-13 | Stop reason: SDUPTHER

## 2019-02-04 NOTE — PROGRESS NOTES
Subjective   Devan Alarcon is a 43 y.o. male.     History of Present Illness follow-up.  Has had follow-up with GI.  Has had follow-up with surgery who removed the feeding tube and released.  Has pending follow-up with oncology.  Still significantly weak with poor stamina.  Abdominal pain is improving but still unpredictable.  Appetite is improving.  Food intolerances are improving.  Denies recent fever chills.  Denies CP cough SOB.  Having some increase in lower extremity unpredictable cramps restlessness.  Occurring some during the day.  Will be seeing GI later this week.  Still has some time scheduled off work.  Does not feel can perform expected duties and [place] industrially.  I agree.    The following portions of the patient's history were reviewed and updated as appropriate: allergies, current medications, past medical history, past social history, past surgical history and problem list.    Review of Systems see the history of present illness    Objective   Physical Exam   Constitutional: He is oriented to person, place, and time. He appears well-developed and well-nourished.   HENT:   Head: Normocephalic.   Mouth/Throat: Oropharynx is clear and moist.   Eyes: Conjunctivae and EOM are normal. Pupils are equal, round, and reactive to light.   Neck: Normal range of motion. Neck supple. No tracheal deviation present. No thyromegaly present.   Cardiovascular: Normal rate, regular rhythm and normal heart sounds.   No murmur heard.  Pulmonary/Chest: Effort normal and breath sounds normal.   Abdominal: Soft. Bowel sounds are normal. He exhibits no distension. There is no guarding.   Musculoskeletal: He exhibits no edema.   Neurological: He is alert and oriented to person, place, and time.   Skin: Skin is warm and dry.   Healed scar left anterior cervical region.  Scar anterior abdominal wall.  Feeding tube has been removed   Psychiatric: He has a normal mood and affect.   Vitals reviewed.      Assessment/Plan    Devan was seen today for uri, med refill and feeding tube removal .    Diagnoses and all orders for this visit:    History of esophagectomy  -     CBC & Differential  -     Comprehensive Metabolic Panel  -     CBC Auto Differential    Restless leg syndrome  -     rOPINIRole (REQUIP) 1 MG tablet; Take 1 tablet by mouth 2 (Two) Times a Day. Take 1 hour before bedtime.  -     CBC & Differential  -     Comprehensive Metabolic Panel  -     CBC Auto Differential    Other orders  -     Cancel: amitriptyline (ELAVIL) 25 MG tablet; Take 1 tablet by mouth Every Night.    Will increase the Requip.  Will check labs.  It seems that you are doing reasonably well.  I would anticipate over the next unpredictable amount of time for you to continue to improve daily improving stamina.  Continue to push nutrition hydration.  Keep follow-ups as scheduled.  Recheck here in a couple of months.  You may have significant challenges in returning to your exact prior work responsibilities.    Patient's Body mass index is 25.56 kg/m². BMI is within normal parameters. No follow-up required..

## 2019-02-05 NOTE — PROGRESS NOTES
Laboratory shows mildly worse anemia.  Hemoglobin 9.8.  I believe should consider taking some iron.  If GI will start that okay otherwise we can.  Let him make GI aware of these results when he sees them.

## 2019-02-07 ENCOUNTER — OFFICE VISIT (OUTPATIENT)
Dept: GASTROENTEROLOGY | Facility: CLINIC | Age: 44
End: 2019-02-07

## 2019-02-07 ENCOUNTER — TELEPHONE (OUTPATIENT)
Dept: GASTROENTEROLOGY | Facility: CLINIC | Age: 44
End: 2019-02-07

## 2019-02-07 VITALS
SYSTOLIC BLOOD PRESSURE: 127 MMHG | WEIGHT: 201 LBS | HEART RATE: 84 BPM | DIASTOLIC BLOOD PRESSURE: 77 MMHG | HEIGHT: 74 IN | BODY MASS INDEX: 25.8 KG/M2

## 2019-02-07 DIAGNOSIS — Z85.01 PERSONAL HISTORY OF ESOPHAGEAL CANCER: Primary | ICD-10-CM

## 2019-02-07 DIAGNOSIS — D50.9 IRON DEFICIENCY ANEMIA, UNSPECIFIED IRON DEFICIENCY ANEMIA TYPE: ICD-10-CM

## 2019-02-07 LAB
DEPRECATED RDW RBC AUTO: 48.6 FL (ref 37–54)
ERYTHROCYTE [DISTWIDTH] IN BLOOD BY AUTOMATED COUNT: 18.3 % (ref 11.5–14.5)
HCT VFR BLD AUTO: 34.4 % (ref 42–52)
HGB BLD-MCNC: 9.9 G/DL (ref 14–18)
IRON 24H UR-MRATE: 10 MCG/DL (ref 53–167)
IRON SATN MFR SERPL: 2 % (ref 20–50)
MCH RBC QN AUTO: 21 PG (ref 27–33)
MCHC RBC AUTO-ENTMCNC: 28.8 G/DL (ref 33–37)
MCV RBC AUTO: 72.9 FL (ref 80–94)
PLATELET # BLD AUTO: 533 10*3/MM3 (ref 130–400)
PMV BLD AUTO: 9.4 FL (ref 6–10)
RBC # BLD AUTO: 4.72 10*6/MM3 (ref 4.7–6.1)
TIBC SERPL-MCNC: 421 MCG/DL (ref 241–421)
WBC NRBC COR # BLD: 7.09 10*3/MM3 (ref 4.5–12.5)

## 2019-02-07 PROCEDURE — 85027 COMPLETE CBC AUTOMATED: CPT | Performed by: PHYSICIAN ASSISTANT

## 2019-02-07 PROCEDURE — 83540 ASSAY OF IRON: CPT | Performed by: PHYSICIAN ASSISTANT

## 2019-02-07 PROCEDURE — 83550 IRON BINDING TEST: CPT | Performed by: PHYSICIAN ASSISTANT

## 2019-02-07 PROCEDURE — 99214 OFFICE O/P EST MOD 30 MIN: CPT | Performed by: PHYSICIAN ASSISTANT

## 2019-02-07 PROCEDURE — 36415 COLL VENOUS BLD VENIPUNCTURE: CPT | Performed by: PHYSICIAN ASSISTANT

## 2019-02-07 RX ORDER — DOXYCYCLINE HYCLATE 50 MG/1
324 CAPSULE, GELATIN COATED ORAL 2 TIMES DAILY WITH MEALS
Qty: 60 TABLET | Refills: 5 | Status: SHIPPED | OUTPATIENT
Start: 2019-02-07 | End: 2019-11-01 | Stop reason: SDUPTHER

## 2019-02-07 NOTE — PROGRESS NOTES
": 1975    Chief Complaint   Patient presents with   • Esophageal Cancer       Devan Alarcon is a 43 y.o. male who presents to the office today as a follow up appointment regarding Esophageal Cancer.    History of Present Illness:  He has been off work at [employer] since 2018 when he had his first procedure for Zapien's esophagus with high grade dysplasia at [facility]. He later had surgery due to finding of very small \"tic tac\" size esophageal cancer. He had esophageal surgery in which most of his esophagus was removed. He had j-tube placed which was removed recently. He admits generalized weakness but generalized and moderate abdominal soreness. He does not feel that he is ready to return to work at this time. His work involves lifting and straining and he is afraid he will injure himself if he goes back to work too soon.   He is also having explosive diarrhea intermittently. He has BMs every day and never skip days between stools. He states that his BMs are always loose, never hard. Also reports notable borborygmi. He was told recently by his PCP that he may need to take iron due to anemia. Denies any black stools or obvious rectal bleeding.    Labs 2019:  Hemoglobin 14.0 - 18.0 g/dL 9.8 Abnormally low     Hematocrit 42.0 - 52.0 % 34.2 Abnormally low     MCV 80.0 - 94.0 fL 73.4 Abnormally low       Review of Systems   Constitutional: Positive for chills and fatigue. Negative for fever.   HENT: Positive for trouble swallowing.    Eyes: Negative.    Respiratory: Positive for cough. Negative for choking, chest tightness and shortness of breath.    Cardiovascular: Negative for chest pain.   Gastrointestinal: Positive for abdominal distention, abdominal pain, diarrhea and nausea. Negative for anal bleeding, blood in stool, constipation and vomiting.   Endocrine: Negative.    Genitourinary: Negative for difficulty urinating.   Musculoskeletal: Negative for back pain and neck pain.   Skin: " Negative for color change, pallor, rash and wound.   Allergic/Immunologic: Negative for environmental allergies and food allergies.   Neurological: Positive for dizziness. Negative for light-headedness and headaches.   Hematological: Does not bruise/bleed easily.   Psychiatric/Behavioral: Negative.        Past Medical History:   Diagnosis Date   • Arm fracture    • Arthritis     knee and hands   • Elevated cholesterol    • Esophageal cancer (CMS/HCC)    • Esophageal cancer (CMS/HCC)    • GERD (gastroesophageal reflux disease)    • History of migraine headaches        Past Surgical History:   Procedure Laterality Date   • ENDOSCOPY N/A 9/20/2018    Procedure: ESOPHAGOGASTRODUODENOSCOPY WITH BIOPSY CPT CODE: 87591;  Surgeon: Baron Mccall MD;  Location: [facility];  Service: Gastroenterology   • ENDOSCOPY N/A 10/17/2018    Procedure: ESOPHAGOGASTRODUODENOSCOPY WITH BIOPSY CPT CODE: 41427;  Surgeon: Baron Mccall MD;  Location: [facility] OR;  Service: Gastroenterology   • ENDOSCOPY      with ablation   • ESOPHAGECTOMY     • MOUTH SURGERY         Family History   Problem Relation Age of Onset   • Osteoporosis Mother    • No Known Problems Father        Social History     Socioeconomic History   • Marital status: Single     Spouse name: Not on file   • Number of children: Not on file   • Years of education: Not on file   • Highest education level: Not on file   Tobacco Use   • Smoking status: Former Smoker     Packs/day: 0.00     Years: 25.00     Pack years: 0.00     Types: Pipe   • Smokeless tobacco: Never Used   Substance and Sexual Activity   • Alcohol use: No   • Drug use: No   • Sexual activity: Defer       Current Outpatient Medications:   •  amitriptyline (ELAVIL) 25 MG tablet, Take 1 tablet by mouth Every Night., Disp: 30 tablet, Rfl: 5  •  dexlansoprazole (DEXILANT) 60 MG capsule, Take 1 capsule by mouth Daily., Disp: 30 capsule, Rfl: 5  •  HYDROcodone-acetaminophen (NORCO) 5-325 MG per tablet, Take  "1 tablet by mouth Every 6 (Six) Hours As Needed for Moderate Pain ., Disp: 8 tablet, Rfl: 0  •  LORazepam (ATIVAN) 1 MG tablet, Take 1 tablet by mouth 2 (Two) Times a Day As Needed for Anxiety., Disp: 24 tablet, Rfl:   •  ondansetron (ZOFRAN) 4 MG tablet, Take 4 mg by mouth As Needed for Nausea or Vomiting., Disp: , Rfl:   •  raNITIdine (ZANTAC) 300 MG tablet, Take 1 tablet by mouth 2 (Two) Times a Day., Disp: 60 tablet, Rfl: 5  •  rOPINIRole (REQUIP) 1 MG tablet, Take 1 tablet by mouth 2 (Two) Times a Day. Take 1 hour before bedtime., Disp: 60 tablet, Rfl: 3    Allergies:   Patient has no known allergies.    Vitals:  /77 (BP Location: Left arm, Patient Position: Sitting, Cuff Size: Adult)   Pulse 84   Ht 188 cm (74\")   Wt 91.2 kg (201 lb)   BMI 25.81 kg/m²     Physical Exam   Constitutional: He is oriented to person, place, and time. He appears well-developed and well-nourished. No distress.   HENT:   Head: Normocephalic and atraumatic.   Nose: Nose normal.   Mouth/Throat: Oropharynx is clear and moist.   Eyes: Conjunctivae are normal. Right eye exhibits no discharge. Left eye exhibits no discharge. No scleral icterus.   Neck: Normal range of motion. No JVD present.   Cardiovascular: Normal rate, regular rhythm and normal heart sounds. Exam reveals no gallop and no friction rub.   No murmur heard.  Pulmonary/Chest: Effort normal and breath sounds normal. No respiratory distress. He has no wheezes. He has no rales. He exhibits no tenderness.   Abdominal: Soft. Bowel sounds are normal. He exhibits no mass. There is tenderness (generalized, mild).   Healing j-tube wound with scar formation. Long elliptical incisional scar upper abdomen.    Musculoskeletal: Normal range of motion. He exhibits no edema or deformity.   Pain with spine ROM   Neurological: He is alert and oriented to person, place, and time. Coordination normal.   Skin: Skin is warm and dry. No rash noted. He is not diaphoretic. No erythema. "   Psychiatric: He has a normal mood and affect. His behavior is normal. Judgment and thought content normal.   Vitals reviewed.      Assessment/Plan:  1. Personal history of esophageal cancer    2. Iron deficiency anemia, unspecified iron deficiency anemia type      Orders Placed This Encounter   Procedures   • Iron Profile   • Occult Blood X 3, Stool - Stool, Per Rectum   • CBC (No Diff)     New Medications Ordered This Visit   Medications   • ferrous gluconate (FERGON) 324 MG tablet     Sig: Take 1 tablet by mouth 2 (Two) Times a Day With Meals.     Dispense:  60 tablet     Refill:  5     FMLA papers were completed for patient, he will return to work approx 3/18/19 with time to strengthen core muscles and heal from not only j-tube removal but very extensive esophageal surgery. He will start taking iron supplements as recommended. Iron will be checked today. He will have occult stool testing x3 as a precaution.       Return if symptoms worsen or fail to improve.      Electronically signed 2/15/2019 9:54 AM  Karen May PA-C, [facility]

## 2019-02-07 NOTE — TELEPHONE ENCOUNTER
I gave Joi new [facility] paper which shows correct return date to work of 3/18/2019 which I attended for him from the start.     Please call patient or wife to let them know that Iron is very very low at 10 (should be above 50) and to go ahead and start the iron as prescribed twice daily with the Vit C.     Also- did we give him stool cards while he was here today? If not, he will need them mailed to him. Thanks

## 2019-02-07 NOTE — PATIENT INSTRUCTIONS
Get Vit C over the counter and take with iron after a meal. Monitor for constipation, take Miralax as needed. Note your stool will change color to dark green.

## 2019-02-08 NOTE — TELEPHONE ENCOUNTER
I left a message on patient's answering machine to please call office. I did give him his stool cards yesterday at his visit.

## 2019-02-15 ENCOUNTER — APPOINTMENT (OUTPATIENT)
Dept: LAB | Facility: HOSPITAL | Age: 44
End: 2019-02-15

## 2019-02-15 LAB
COLLECT DATE SP2 STL: NORMAL
COLLECT DATE SP3 STL: NORMAL
COLLECT DATE STL: NORMAL
HEMOCCULT STL QL: NEGATIVE
Lab: NORMAL

## 2019-02-15 PROCEDURE — 82272 OCCULT BLD FECES 1-3 TESTS: CPT | Performed by: PHYSICIAN ASSISTANT

## 2019-02-18 ENCOUNTER — TELEPHONE (OUTPATIENT)
Dept: GASTROENTEROLOGY | Facility: CLINIC | Age: 44
End: 2019-02-18

## 2019-03-11 ENCOUNTER — OFFICE VISIT (OUTPATIENT)
Dept: GASTROENTEROLOGY | Facility: CLINIC | Age: 44
End: 2019-03-11

## 2019-03-11 ENCOUNTER — DOCUMENTATION (OUTPATIENT)
Dept: GASTROENTEROLOGY | Facility: CLINIC | Age: 44
End: 2019-03-11

## 2019-03-11 VITALS
HEIGHT: 74 IN | WEIGHT: 199.4 LBS | DIASTOLIC BLOOD PRESSURE: 87 MMHG | HEART RATE: 82 BPM | BODY MASS INDEX: 25.59 KG/M2 | OXYGEN SATURATION: 96 % | SYSTOLIC BLOOD PRESSURE: 139 MMHG

## 2019-03-11 DIAGNOSIS — Z85.01 PERSONAL HISTORY OF ESOPHAGEAL CANCER: Primary | ICD-10-CM

## 2019-03-11 DIAGNOSIS — D50.9 IRON DEFICIENCY ANEMIA, UNSPECIFIED IRON DEFICIENCY ANEMIA TYPE: ICD-10-CM

## 2019-03-11 LAB
DEPRECATED RDW RBC AUTO: 71.8 FL (ref 37–54)
ERYTHROCYTE [DISTWIDTH] IN BLOOD BY AUTOMATED COUNT: 25.7 % (ref 12.3–15.4)
HCT VFR BLD AUTO: 44.9 % (ref 37.5–51)
HGB BLD-MCNC: 12.8 G/DL (ref 13–17.7)
IRON 24H UR-MRATE: 36 MCG/DL (ref 59–158)
IRON SATN MFR SERPL: 7 % (ref 20–50)
MCH RBC QN AUTO: 22.9 PG (ref 26.6–33)
MCHC RBC AUTO-ENTMCNC: 28.5 G/DL (ref 31.5–35.7)
MCV RBC AUTO: 80.3 FL (ref 79–97)
PLATELET # BLD AUTO: 404 10*3/MM3 (ref 140–450)
PMV BLD AUTO: 11.2 FL (ref 6–12)
RBC # BLD AUTO: 5.59 10*6/MM3 (ref 4.14–5.8)
TIBC SERPL-MCNC: 523 MCG/DL (ref 298–536)
TRANSFERRIN SERPL-MCNC: 351 MG/DL (ref 200–360)
WBC NRBC COR # BLD: 7.13 10*3/MM3 (ref 3.4–10.8)

## 2019-03-11 PROCEDURE — 85027 COMPLETE CBC AUTOMATED: CPT | Performed by: PHYSICIAN ASSISTANT

## 2019-03-11 PROCEDURE — 99213 OFFICE O/P EST LOW 20 MIN: CPT | Performed by: PHYSICIAN ASSISTANT

## 2019-03-11 PROCEDURE — 84466 ASSAY OF TRANSFERRIN: CPT | Performed by: PHYSICIAN ASSISTANT

## 2019-03-11 PROCEDURE — 36415 COLL VENOUS BLD VENIPUNCTURE: CPT | Performed by: PHYSICIAN ASSISTANT

## 2019-03-11 PROCEDURE — 83540 ASSAY OF IRON: CPT | Performed by: PHYSICIAN ASSISTANT

## 2019-03-11 NOTE — PROGRESS NOTES
: 1975    Chief Complaint   Patient presents with   • History of esophageal cancer       Devan Alarcon is a 43 y.o. male who presents to the office today as a follow up appointment regarding history of esophageal cancer.    History of Present Illness:  Patient reports that he is considering more time off work due to continued abdominal weakness, soreness and decreased range of motion.  He has been off work from [employer] since 2018 since he had his first procedure for treatment of Zapien's esophagus with high-grade dysplasia at [facility].  He later had surgery due to finding of a very small size esophageal cancer and had esophagectomy.  He subsequently had a J-tube placed and this was removed just prior to his last appointment.  He states that he has been doing resistance exercises and still has a lot of difficulty sitting up from a supine position.  He does not lie completely flat due to fear of acid reflux without his esophagus or lower esophageal sphincter to stop the acid from coming up. He is afraid if he returns to work now he will have further injuries. He wears abdominal binder at home which does help some with his discomfort.  He has been taking iron since 2019 twice daily as directed.  Occult stools recently performed were negative.  Serum iron on 2019 was 10.  Hemoglobin was 9.9 and hematocrit was 34.4 with a low MCV at 72.9.    Review of Systems   Constitutional: Positive for chills and fatigue. Negative for fever.   HENT: Positive for trouble swallowing.    Eyes: Negative.    Respiratory: Negative for cough, choking, chest tightness and shortness of breath.    Cardiovascular: Negative for chest pain.   Gastrointestinal: Positive for abdominal distention, abdominal pain, diarrhea and nausea. Negative for anal bleeding, blood in stool, constipation and vomiting.   Endocrine: Negative.    Genitourinary: Negative for difficulty urinating.   Musculoskeletal: Negative for  back pain and neck pain.   Skin: Negative for color change, pallor, rash and wound.   Allergic/Immunologic: Negative for environmental allergies and food allergies.   Neurological: Positive for dizziness. Negative for light-headedness and headaches.   Hematological: Does not bruise/bleed easily.   Psychiatric/Behavioral: Negative.        Past Medical History:   Diagnosis Date   • Arm fracture    • Arthritis     knee and hands   • Elevated cholesterol    • Esophageal cancer (CMS/HCC)    • Esophageal cancer (CMS/HCC)    • GERD (gastroesophageal reflux disease)    • History of migraine headaches        Past Surgical History:   Procedure Laterality Date   • ENDOSCOPY N/A 9/20/2018    Procedure: ESOPHAGOGASTRODUODENOSCOPY WITH BIOPSY CPT CODE: 17175;  Surgeon: Baron Mccall MD;  Location: [facility];  Service: Gastroenterology   • ENDOSCOPY N/A 10/17/2018    Procedure: ESOPHAGOGASTRODUODENOSCOPY WITH BIOPSY CPT CODE: 51042;  Surgeon: Baron Mccall MD;  Location: [facility];  Service: Gastroenterology   • ENDOSCOPY      with ablation   • ESOPHAGECTOMY     • MOUTH SURGERY         Family History   Problem Relation Age of Onset   • Osteoporosis Mother    • No Known Problems Father        Social History     Socioeconomic History   • Marital status: Single     Spouse name: Not on file   • Number of children: Not on file   • Years of education: Not on file   • Highest education level: Not on file   Tobacco Use   • Smoking status: Former Smoker     Packs/day: 0.00     Years: 25.00     Pack years: 0.00     Types: Pipe   • Smokeless tobacco: Never Used   Substance and Sexual Activity   • Alcohol use: No   • Drug use: No   • Sexual activity: Defer       Current Outpatient Medications:   •  amitriptyline (ELAVIL) 25 MG tablet, Take 1 tablet by mouth Every Night., Disp: 30 tablet, Rfl: 5  •  dexlansoprazole (DEXILANT) 60 MG capsule, Take 1 capsule by mouth Daily., Disp: 30 capsule, Rfl: 5  •  ferrous gluconate (FERGON)  "324 MG tablet, Take 1 tablet by mouth 2 (Two) Times a Day With Meals., Disp: 60 tablet, Rfl: 5  •  HYDROcodone-acetaminophen (NORCO) 5-325 MG per tablet, Take 1 tablet by mouth Every 6 (Six) Hours As Needed for Moderate Pain ., Disp: 8 tablet, Rfl: 0  •  LORazepam (ATIVAN) 1 MG tablet, Take 1 tablet by mouth 2 (Two) Times a Day As Needed for Anxiety., Disp: 24 tablet, Rfl:   •  ondansetron (ZOFRAN) 4 MG tablet, Take 4 mg by mouth As Needed for Nausea or Vomiting., Disp: , Rfl:   •  raNITIdine (ZANTAC) 300 MG tablet, Take 1 tablet by mouth 2 (Two) Times a Day., Disp: 60 tablet, Rfl: 5  •  rOPINIRole (REQUIP) 1 MG tablet, Take 1 tablet by mouth 2 (Two) Times a Day. Take 1 hour before bedtime., Disp: 60 tablet, Rfl: 3    Allergies:   Patient has no known allergies.    Vitals:  /87 (BP Location: Left arm, Patient Position: Sitting, Cuff Size: Adult)   Pulse 82   Ht 188 cm (74\")   Wt 90.4 kg (199 lb 6.4 oz)   SpO2 96%   BMI 25.60 kg/m²     Physical Exam   Constitutional: He is oriented to person, place, and time. He appears well-developed and well-nourished. No distress.   HENT:   Head: Normocephalic and atraumatic.   Nose: Nose normal.   Mouth/Throat: Oropharynx is clear and moist.   Eyes: Conjunctivae are normal. Right eye exhibits no discharge. Left eye exhibits no discharge. No scleral icterus.   Neck: Normal range of motion. No JVD present.   Cardiovascular: Normal rate, regular rhythm and normal heart sounds. Exam reveals no gallop and no friction rub.   No murmur heard.  Pulmonary/Chest: Effort normal and breath sounds normal. No respiratory distress. He has no wheezes. He has no rales. He exhibits no tenderness.   Abdominal: Soft. Bowel sounds are normal. He exhibits no mass. There is tenderness (generalized, mild).   Previous j-tube scar, healed well. Long elliptical incisional scar upper abdomen.    Musculoskeletal: Normal range of motion. He exhibits no edema or deformity.   Pain with spine ROM "   Neurological: He is alert and oriented to person, place, and time. Coordination normal.   Skin: Skin is warm and dry. No rash noted. He is not diaphoretic. No erythema.   Psychiatric: He has a normal mood and affect. His behavior is normal. Judgment and thought content normal.   Vitals reviewed.      Assessment/Plan:  1. Personal history of esophageal cancer    2. Iron deficiency anemia, unspecified iron deficiency anemia type      Orders Placed This Encounter   Procedures   • Iron Profile   • CBC (No Diff)     FMLA papers were completed again for patient, he will return to work approx 4/15/19 with more time to strengthen core muscles and heal from not only j-tube removal but very extensive esophageal surgery.  Needed taking ferrous gluconate twice daily as prescribed due to severe iron deficiency.  He will have iron panel and blood count rechecked today to monitor progress.            Return in about 1 month (around 4/11/2019) for recheck abdominal discomfort.      Electronically signed 3/18/2019 9:35 AM  Karen May PA-C, [facility]

## 2019-03-11 NOTE — PROGRESS NOTES
As per Karen, patient needed to have a EGD with Dr. Zapata in [place]. I called and spoke with Eloise in his office and patient is scheduled to have a EGD on 3-28-19 at 9:00 at [facility]. Patient requested this day. Patient is aware of date & time.

## 2019-03-15 RX ORDER — RANITIDINE 150 MG/1
CAPSULE ORAL
Qty: 120 CAPSULE | Refills: 0 | Status: SHIPPED | OUTPATIENT
Start: 2019-03-15 | End: 2019-04-12

## 2019-04-12 ENCOUNTER — OFFICE VISIT (OUTPATIENT)
Dept: GASTROENTEROLOGY | Facility: CLINIC | Age: 44
End: 2019-04-12

## 2019-04-12 VITALS
HEIGHT: 74 IN | OXYGEN SATURATION: 96 % | BODY MASS INDEX: 26 KG/M2 | WEIGHT: 202.6 LBS | SYSTOLIC BLOOD PRESSURE: 134 MMHG | DIASTOLIC BLOOD PRESSURE: 87 MMHG | HEART RATE: 81 BPM

## 2019-04-12 DIAGNOSIS — K21.9 GASTROESOPHAGEAL REFLUX DISEASE, ESOPHAGITIS PRESENCE NOT SPECIFIED: ICD-10-CM

## 2019-04-12 DIAGNOSIS — Z85.01 PERSONAL HISTORY OF ESOPHAGEAL CANCER: Primary | ICD-10-CM

## 2019-04-12 DIAGNOSIS — D50.9 IRON DEFICIENCY ANEMIA, UNSPECIFIED IRON DEFICIENCY ANEMIA TYPE: ICD-10-CM

## 2019-04-12 PROBLEM — K22.711 BARRETT'S ESOPHAGUS WITH HIGH GRADE DYSPLASIA: Status: RESOLVED | Noted: 2018-10-09 | Resolved: 2019-04-12

## 2019-04-12 PROBLEM — K21.00 GASTROESOPHAGEAL REFLUX DISEASE WITH ESOPHAGITIS: Status: RESOLVED | Noted: 2018-10-09 | Resolved: 2019-04-12

## 2019-04-12 PROBLEM — K22.10 ULCER OF ESOPHAGUS WITHOUT BLEEDING: Status: RESOLVED | Noted: 2018-10-09 | Resolved: 2019-04-12

## 2019-04-12 PROBLEM — C15.5 MALIGNANT NEOPLASM OF LOWER THIRD OF ESOPHAGUS (HCC): Status: RESOLVED | Noted: 2018-11-19 | Resolved: 2019-04-12

## 2019-04-12 PROCEDURE — 99213 OFFICE O/P EST LOW 20 MIN: CPT | Performed by: PHYSICIAN ASSISTANT

## 2019-04-12 RX ORDER — DEXLANSOPRAZOLE 60 MG/1
60 CAPSULE, DELAYED RELEASE ORAL DAILY
Qty: 30 CAPSULE | Refills: 5 | Status: SHIPPED | OUTPATIENT
Start: 2019-04-12 | End: 2021-03-12 | Stop reason: SDUPTHER

## 2019-04-12 RX ORDER — RANITIDINE 300 MG/1
300 TABLET ORAL 2 TIMES DAILY
Qty: 60 TABLET | Refills: 5 | Status: SHIPPED | OUTPATIENT
Start: 2019-04-12 | End: 2019-11-01 | Stop reason: SDUPTHER

## 2019-04-12 NOTE — PROGRESS NOTES
: 1975    Chief Complaint   Patient presents with   • History of esophageal cancer       Devan Alarcon is a 43 y.o. male who presents to the office today as a follow up appointment regarding history of esophageal cancer.     History of Present Illness:  He is feeling better, some stronger than previous visit. He plans to return to work at [employer] on 2019. He feels that he is ready but is worried about an injury. He plans to wear his abdominal binder and be careful lifting. He thinks that co-workers will help him not to get injured. He has been doing some exercises at home and is able to go from lying to sitting some easier than previous. He had EGD recently bu Dr. Zapata and there was mild stenosis at anastomosis site from previous esophagectomy and mild gastritis but overall normal. He also had CT scan ordered by oncology recently which did not show worrisome findings, showed diverticulosis and mild thickening of the sigmoid colon. Iron checked at last appointment had improved some but still not within normal range. Hgb improved from 9.9 to 12.8. He continues to take iron supplement as directed with Vit C. He is still having diarrhea but it is some improved. Chemo any further weight loss. Appetite is good. No GERD. No nausea or vomiting.     Review of Systems   Constitutional: Positive for chills and fatigue. Negative for fever.   HENT: Negative for trouble swallowing.    Eyes: Negative.    Respiratory: Negative for cough, choking, chest tightness and shortness of breath.    Cardiovascular: Negative for chest pain.   Gastrointestinal: Positive for abdominal pain, diarrhea and nausea. Negative for abdominal distention, anal bleeding, blood in stool, constipation and vomiting.   Endocrine: Negative.    Genitourinary: Negative for difficulty urinating.   Musculoskeletal: Negative for back pain and neck pain.   Skin: Negative for color change, pallor, rash and wound.   Allergic/Immunologic: Negative for  environmental allergies and food allergies.   Neurological: Positive for dizziness. Negative for light-headedness and headaches.   Hematological: Does not bruise/bleed easily.   Psychiatric/Behavioral: Negative.        Past Medical History:   Diagnosis Date   • Arm fracture    • Arthritis     knee and hands   • Elevated cholesterol    • Esophageal cancer (CMS/HCC)    • Esophageal cancer (CMS/HCC)    • GERD (gastroesophageal reflux disease)    • History of migraine headaches        Past Surgical History:   Procedure Laterality Date   • ENDOSCOPY N/A 9/20/2018    Procedure: ESOPHAGOGASTRODUODENOSCOPY WITH BIOPSY CPT CODE: 85404;  Surgeon: Baron Mccall MD;  Location: [facility];  Service: Gastroenterology   • ENDOSCOPY N/A 10/17/2018    Procedure: ESOPHAGOGASTRODUODENOSCOPY WITH BIOPSY CPT CODE: 01973;  Surgeon: Baron Mccall MD;  Location: [facility];  Service: Gastroenterology   • ENDOSCOPY      with ablation   • ESOPHAGECTOMY     • MOUTH SURGERY         Family History   Problem Relation Age of Onset   • Osteoporosis Mother    • No Known Problems Father        Social History     Socioeconomic History   • Marital status: Single     Spouse name: Not on file   • Number of children: Not on file   • Years of education: Not on file   • Highest education level: Not on file   Tobacco Use   • Smoking status: Former Smoker     Packs/day: 0.00     Years: 25.00     Pack years: 0.00     Types: Pipe   • Smokeless tobacco: Never Used   Substance and Sexual Activity   • Alcohol use: No   • Drug use: No   • Sexual activity: Defer       Current Outpatient Medications:   •  amitriptyline (ELAVIL) 25 MG tablet, Take 1 tablet by mouth Every Night., Disp: 30 tablet, Rfl: 5  •  dexlansoprazole (DEXILANT) 60 MG capsule, Take 1 capsule by mouth Daily., Disp: 30 capsule, Rfl: 5  •  ferrous gluconate (FERGON) 324 MG tablet, Take 1 tablet by mouth 2 (Two) Times a Day With Meals., Disp: 60 tablet, Rfl: 5  •   "HYDROcodone-acetaminophen (NORCO) 5-325 MG per tablet, Take 1 tablet by mouth Every 6 (Six) Hours As Needed for Moderate Pain ., Disp: 8 tablet, Rfl: 0  •  LORazepam (ATIVAN) 1 MG tablet, Take 1 tablet by mouth 2 (Two) Times a Day As Needed for Anxiety., Disp: 24 tablet, Rfl:   •  ondansetron (ZOFRAN) 4 MG tablet, Take 4 mg by mouth As Needed for Nausea or Vomiting., Disp: , Rfl:   •  raNITIdine (ZANTAC) 300 MG tablet, Take 1 tablet by mouth 2 (Two) Times a Day., Disp: 60 tablet, Rfl: 5  •  rOPINIRole (REQUIP) 1 MG tablet, Take 1 tablet by mouth 2 (Two) Times a Day. Take 1 hour before bedtime., Disp: 60 tablet, Rfl: 3    Allergies:   Patient has no known allergies.    Vitals:  /87 (BP Location: Left arm, Patient Position: Sitting, Cuff Size: Adult)   Pulse 81   Ht 188 cm (74\")   Wt 91.9 kg (202 lb 9.6 oz)   SpO2 96%   BMI 26.01 kg/m²     Physical Exam   Constitutional: He is oriented to person, place, and time. He appears well-developed and well-nourished. No distress.   HENT:   Head: Normocephalic and atraumatic.   Nose: Nose normal.   Mouth/Throat: Oropharynx is clear and moist.   Eyes: Conjunctivae are normal. Right eye exhibits no discharge. Left eye exhibits no discharge. No scleral icterus.   Neck: Normal range of motion. No JVD present.   Cardiovascular: Normal rate, regular rhythm and normal heart sounds. Exam reveals no gallop and no friction rub.   No murmur heard.  Pulmonary/Chest: Effort normal and breath sounds normal. No respiratory distress. He has no wheezes. He has no rales. He exhibits no tenderness.   Abdominal: Soft. Bowel sounds are normal. He exhibits no mass. There is tenderness (generalized, mild).   Previous j-tube scar, healed well. Long elliptical incisional scar upper abdomen.    Musculoskeletal: Normal range of motion. He exhibits no edema or deformity.   Pain with spine ROM   Neurological: He is alert and oriented to person, place, and time. Coordination normal.   Skin: Skin " is warm and dry. No rash noted. He is not diaphoretic. No erythema.   Psychiatric: He has a normal mood and affect. His behavior is normal. Judgment and thought content normal.   Vitals reviewed.      Assessment/Plan:  1. Personal history of esophageal cancer    2. Gastroesophageal reflux disease, esophagitis presence not specified    3. Iron deficiency anemia, unspecified iron deficiency anemia type      He will return to work after recovery from his surgery on 4/16/2019. He feels ready to go back. He will wear abdominal binder while working. Continue Dexilant 60 mg once daily plus Zantac 300 mg BID for treatment of GERD. Call with concerns. We will re-check iron and CBC at next visit. Continue iron supplements for now.     New Medications Ordered This Visit   Medications   • dexlansoprazole (DEXILANT) 60 MG capsule     Sig: Take 1 capsule by mouth Daily.     Dispense:  30 capsule     Refill:  5   • raNITIdine (ZANTAC) 300 MG tablet     Sig: Take 1 tablet by mouth 2 (Two) Times a Day.     Dispense:  60 tablet     Refill:  5           Return in about 3 months (around 7/12/2019) for recheck abdominal pain and KATH.      Electronically signed 4/12/2019 9:51 AM  Karen May PA-C, [facility]

## 2019-04-15 ENCOUNTER — OFFICE VISIT (OUTPATIENT)
Dept: FAMILY MEDICINE CLINIC | Facility: CLINIC | Age: 44
End: 2019-04-15

## 2019-04-15 VITALS
HEART RATE: 106 BPM | TEMPERATURE: 97.6 F | HEIGHT: 74 IN | DIASTOLIC BLOOD PRESSURE: 68 MMHG | WEIGHT: 201 LBS | SYSTOLIC BLOOD PRESSURE: 120 MMHG | BODY MASS INDEX: 25.8 KG/M2 | OXYGEN SATURATION: 98 %

## 2019-04-15 DIAGNOSIS — J32.1 FRONTAL SINUSITIS, UNSPECIFIED CHRONICITY: Primary | ICD-10-CM

## 2019-04-15 DIAGNOSIS — J06.9 UPPER RESPIRATORY TRACT INFECTION, UNSPECIFIED TYPE: ICD-10-CM

## 2019-04-15 PROCEDURE — 99213 OFFICE O/P EST LOW 20 MIN: CPT | Performed by: NURSE PRACTITIONER

## 2019-04-15 RX ORDER — DOXYCYCLINE 100 MG/1
100 CAPSULE ORAL 2 TIMES DAILY
Qty: 20 CAPSULE | Refills: 0 | Status: SHIPPED | OUTPATIENT
Start: 2019-04-15 | End: 2019-04-25

## 2019-04-15 RX ORDER — BENZONATATE 100 MG/1
100 CAPSULE ORAL 3 TIMES DAILY PRN
Qty: 30 CAPSULE | Refills: 0 | Status: SHIPPED | OUTPATIENT
Start: 2019-04-15 | End: 2019-06-27

## 2019-04-15 RX ORDER — ALBUTEROL SULFATE 90 UG/1
2 AEROSOL, METERED RESPIRATORY (INHALATION) EVERY 4 HOURS PRN
COMMUNITY
End: 2019-09-23 | Stop reason: SDUPTHER

## 2019-04-15 RX ORDER — CETIRIZINE HYDROCHLORIDE 10 MG/1
10 TABLET ORAL DAILY
Qty: 30 TABLET | Refills: 1 | Status: SHIPPED | OUTPATIENT
Start: 2019-04-15 | End: 2019-09-13 | Stop reason: SDUPTHER

## 2019-04-15 RX ORDER — LATANOPROST 50 UG/ML
SOLUTION/ DROPS OPHTHALMIC
Refills: 5 | COMMUNITY
Start: 2019-03-15 | End: 2021-03-12

## 2019-04-15 NOTE — PROGRESS NOTES
Subjective   Devan Alarcon is a 43 y.o. male.     Patient is presenting today with new onset of symptoms of upper respiratory infection.   He is reporting frequent cough that started about 1 week ago.  He is having some clear sputum production.  Some shortness breath with cough.  Also having sinus pain pressure and congestion.    Denies any fever or chills at this time.  Denies any nausea, vomiting or diarrhea.  He has history of stomach surgery related to some cancer.  His cough so much that his stomach is sore.  Denies any constipation or diarrhea or blood in stools.  No vomiting.  Follows with oncology.  No other complaints today.         The following portions of the patient's history were reviewed and updated as appropriate: allergies, current medications, past family history, past medical history, past social history, past surgical history and problem list.    Review of Systems   Constitutional: Negative.    HENT: Positive for congestion, sinus pressure, sneezing and sore throat.    Respiratory: Positive for cough and shortness of breath.    Cardiovascular: Negative.    Gastrointestinal: Negative.    Endocrine: Positive for cold intolerance.   Genitourinary: Negative.    Musculoskeletal: Positive for myalgias.   Skin: Negative.    Allergic/Immunologic: Negative for environmental allergies.   Neurological: Negative.    Hematological: Negative.    Psychiatric/Behavioral: Negative.        Objective   Physical Exam   Constitutional: He appears well-developed. No distress.   HENT:   Head: Normocephalic and atraumatic.   Nose: Mucosal edema and sinus tenderness present.   Eyes: Conjunctivae and EOM are normal.   Neck: Neck supple. No JVD present.   Cardiovascular: Normal rate and normal heart sounds.   Pulmonary/Chest: Effort normal and breath sounds normal. No respiratory distress.   Abdominal: Soft. Bowel sounds are normal. He exhibits no distension.   Musculoskeletal: Normal range of motion. He exhibits no edema.    Neurological: He is alert.   Skin: Skin is warm and intact. No rash noted.        Psychiatric: He has a normal mood and affect.       Assessment/Plan   Jack was seen today for sinusitis, uri and cough.  Will prescribe doxycycline 100 mg to be taken p.o. twice daily times 10 days, Tessalon Perles 100 mg to be taken p.o. 3 times daily as needed for cough.  Take cetirizine 10 mg daily times 2-4 weeks.  Instructed to rest and increase fluids, avoiding allergens and irritants, avoiding prolonged exposure to cold temperatures.  He is to report back ASAP if any fever, chest pain, chills, hemoptysis or other symptoms worsen or continue.  The patient verbalizes understanding and his wife is present with him and verbalizes understanding as well.  If symptoms resolve and he has no complications he will continue his follow-up with Dr. Zheng as previously scheduled.  We will also continue to follow with oncology.    Diagnoses and all orders for this visit:    Frontal sinusitis, unspecified chronicity    Upper respiratory tract infection, unspecified type    Other orders  -     cetirizine (zyrTEC) 10 MG tablet; Take 1 tablet by mouth Daily.  -     doxycycline (MONODOX) 100 MG capsule; Take 1 capsule by mouth 2 (Two) Times a Day for 10 days.  -     benzonatate (TESSALON PERLES) 100 MG capsule; Take 1 capsule by mouth 3 (Three) Times a Day As Needed for Cough.        Patient's There is no height or weight on file to calculate BMI. BMI is within normal parameters. No follow-up required..

## 2019-05-02 ENCOUNTER — OFFICE VISIT (OUTPATIENT)
Dept: GASTROENTEROLOGY | Facility: CLINIC | Age: 44
End: 2019-05-02

## 2019-05-02 ENCOUNTER — OFFICE VISIT (OUTPATIENT)
Dept: SURGERY | Facility: CLINIC | Age: 44
End: 2019-05-02

## 2019-05-02 VITALS — HEIGHT: 74 IN | BODY MASS INDEX: 25.28 KG/M2 | WEIGHT: 197 LBS

## 2019-05-02 VITALS
HEIGHT: 74 IN | OXYGEN SATURATION: 98 % | DIASTOLIC BLOOD PRESSURE: 74 MMHG | HEART RATE: 63 BPM | WEIGHT: 197.6 LBS | SYSTOLIC BLOOD PRESSURE: 118 MMHG | BODY MASS INDEX: 25.36 KG/M2

## 2019-05-02 DIAGNOSIS — K43.2 INCISIONAL HERNIA, WITHOUT OBSTRUCTION OR GANGRENE: Primary | ICD-10-CM

## 2019-05-02 PROCEDURE — 99203 OFFICE O/P NEW LOW 30 MIN: CPT | Performed by: SURGERY

## 2019-05-02 PROCEDURE — 99213 OFFICE O/P EST LOW 20 MIN: CPT | Performed by: PHYSICIAN ASSISTANT

## 2019-05-02 PROCEDURE — 99406 BEHAV CHNG SMOKING 3-10 MIN: CPT | Performed by: SURGERY

## 2019-05-02 RX ORDER — MULTIVIT WITH MINERALS/LUTEIN
250 TABLET ORAL DAILY
COMMUNITY
End: 2021-03-12

## 2019-05-02 NOTE — PROGRESS NOTES
: 1975    Chief Complaint   Patient presents with   • Hernia       Devan Alarcon is a 43 y.o. male who presents to the office today as a follow up appointment regarding hernia.    History of Present Illness:  Over the past few weeks, since returning to work after his esophagectomy surgery, he has noticed a bulge in his epigastric region along his incisional scar. He has been wearing abdominal binder with relief from most of the discomfort related to it. He had a sinus infection and was coughing hard when he first noticed the bulge. He denies severe sharp abdominal pain. Bulge is worse when straining or going from lying to sitting. He thinks he has a hernia in that area. Appetite is good, he eats less than previous, he has lost a few lbs since last appointment but states he is eating regular meals. Denies any changes in bowel habits, no nausea or vomiting.    Review of Systems   Constitutional: Positive for chills and fatigue. Negative for fever.   HENT: Negative for trouble swallowing.    Eyes: Negative.    Respiratory: Negative for cough, choking, chest tightness and shortness of breath.    Cardiovascular: Negative for chest pain.   Gastrointestinal: Positive for abdominal distention, abdominal pain, diarrhea and nausea. Negative for blood in stool, constipation, rectal pain and vomiting.   Endocrine: Negative.    Genitourinary: Negative for difficulty urinating.   Musculoskeletal: Negative for back pain and neck pain.   Skin: Negative for pallor and wound.   Allergic/Immunologic: Negative for environmental allergies and food allergies.   Neurological: Positive for dizziness. Negative for light-headedness and headaches.   Hematological: Does not bruise/bleed easily.   Psychiatric/Behavioral: Negative.        Past Medical History:   Diagnosis Date   • Arm fracture    • Arthritis     knee and hands   • Elevated cholesterol    • Esophageal cancer (CMS/HCC)    • Esophageal cancer (CMS/HCC)    • GERD  (gastroesophageal reflux disease)    • History of migraine headaches        Past Surgical History:   Procedure Laterality Date   • ENDOSCOPY N/A 9/20/2018    Procedure: ESOPHAGOGASTRODUODENOSCOPY WITH BIOPSY CPT CODE: 92238;  Surgeon: Baron Mccall MD;  Location: [facility] OR;  Service: Gastroenterology   • ENDOSCOPY N/A 10/17/2018    Procedure: ESOPHAGOGASTRODUODENOSCOPY WITH BIOPSY CPT CODE: 74423;  Surgeon: Baron Mccall MD;  Location: [facility] OR;  Service: Gastroenterology   • ENDOSCOPY      with ablation   • ESOPHAGECTOMY     • MOUTH SURGERY         Family History   Problem Relation Age of Onset   • Osteoporosis Mother    • No Known Problems Father        Social History     Socioeconomic History   • Marital status: Single     Spouse name: Not on file   • Number of children: Not on file   • Years of education: Not on file   • Highest education level: Not on file   Tobacco Use   • Smoking status: Current Every Day Smoker     Packs/day: 0.00     Years: 25.00     Pack years: 0.00     Types: Pipe   • Smokeless tobacco: Never Used   Substance and Sexual Activity   • Alcohol use: No   • Drug use: No   • Sexual activity: Defer       Current Outpatient Medications:   •  albuterol sulfate  (90 Base) MCG/ACT inhaler, Inhale 2 puffs Every 4 (Four) Hours As Needed for Wheezing., Disp: , Rfl:   •  amitriptyline (ELAVIL) 25 MG tablet, Take 1 tablet by mouth Every Night., Disp: 30 tablet, Rfl: 5  •  benzonatate (TESSALON PERLES) 100 MG capsule, Take 1 capsule by mouth 3 (Three) Times a Day As Needed for Cough., Disp: 30 capsule, Rfl: 0  •  cetirizine (zyrTEC) 10 MG tablet, Take 1 tablet by mouth Daily., Disp: 30 tablet, Rfl: 1  •  dexlansoprazole (DEXILANT) 60 MG capsule, Take 1 capsule by mouth Daily., Disp: 30 capsule, Rfl: 5  •  ferrous gluconate (FERGON) 324 MG tablet, Take 1 tablet by mouth 2 (Two) Times a Day With Meals., Disp: 60 tablet, Rfl: 5  •  HYDROcodone-acetaminophen (NORCO) 5-325 MG per  "tablet, Take 1 tablet by mouth Every 6 (Six) Hours As Needed for Moderate Pain ., Disp: 8 tablet, Rfl: 0  •  latanoprost (XALATAN) 0.005 % ophthalmic solution, INSTILL ONE DROP IN BOTH EYES AT BEDTIME AS DIRECTED, Disp: , Rfl: 5  •  LORazepam (ATIVAN) 1 MG tablet, Take 1 tablet by mouth 2 (Two) Times a Day As Needed for Anxiety., Disp: 24 tablet, Rfl:   •  ondansetron (ZOFRAN) 4 MG tablet, Take 4 mg by mouth As Needed for Nausea or Vomiting., Disp: , Rfl:   •  raNITIdine (ZANTAC) 300 MG tablet, Take 1 tablet by mouth 2 (Two) Times a Day., Disp: 60 tablet, Rfl: 5  •  rOPINIRole (REQUIP) 1 MG tablet, Take 1 tablet by mouth 2 (Two) Times a Day. Take 1 hour before bedtime., Disp: 60 tablet, Rfl: 3    Allergies:   Patient has no known allergies.    Vitals:  /74   Pulse 63   Ht 188 cm (74\")   Wt 89.6 kg (197 lb 9.6 oz)   SpO2 98%   BMI 25.37 kg/m²     Physical Exam   Constitutional: He is oriented to person, place, and time. He appears well-developed and well-nourished. No distress.   HENT:   Head: Normocephalic and atraumatic.   Nose: Nose normal.   Mouth/Throat: Oropharynx is clear and moist.   Eyes: Conjunctivae are normal. Right eye exhibits no discharge. Left eye exhibits no discharge. No scleral icterus.   Neck: Normal range of motion. No JVD present.   Cardiovascular: Normal rate, regular rhythm and normal heart sounds. Exam reveals no gallop and no friction rub.   No murmur heard.  Pulmonary/Chest: Effort normal and breath sounds normal. No respiratory distress. He has no wheezes. He has no rales. He exhibits no tenderness.   Abdominal: Soft. Bowel sounds are normal. He exhibits no mass. There is tenderness (generalized, mild).   Previous j-tube scar. Long elliptical incisional scar upper abdomen. Ventral hernia, possibly 2, located in epigastric region along scar margin. Reducible.   Musculoskeletal: Normal range of motion. He exhibits no edema or deformity.   Pain with spine ROM   Neurological: He is " alert and oriented to person, place, and time. Coordination normal.   Skin: Skin is warm and dry. No rash noted. He is not diaphoretic. No erythema.   Psychiatric: He has a normal mood and affect. His behavior is normal. Judgment and thought content normal.   Vitals reviewed.      Assessment/Plan:  1. Incisional hernia, without obstruction or gangrene      Orders Placed This Encounter   Procedures   • Ambulatory Referral to General Surgery     He will see Dr. Goodrich later today for evaluation of new ventral hernia.         Keep scheduled appointment for follow up.      Electronically signed 5/2/2019 10:58 AM  Karen May PA-C, [facility]

## 2019-05-02 NOTE — PROGRESS NOTES
Subjective   Devan Alarcon is a 43 y.o. male.     History of Present Illness He has had a esophagectomy and had done well, but recently he has developed a lump in the epigastrium where the scar is. It is mildly sore.     The following portions of the patient's history were reviewed and updated as appropriate: current medications, past family history, past medical history, past social history, past surgical history and problem list.     Review of Systems   Constitutional: Negative for activity change, appetite change, chills, fever and unexpected weight change.   HENT: Negative for congestion, facial swelling and sore throat.    Eyes: Negative for photophobia and visual disturbance.   Respiratory: Negative for chest tightness, shortness of breath and wheezing.    Cardiovascular: Negative for chest pain, palpitations and leg swelling.   Gastrointestinal: Negative for abdominal distention, abdominal pain, anal bleeding, blood in stool, constipation, diarrhea, nausea, rectal pain and vomiting.   Endocrine: Negative for cold intolerance, heat intolerance, polydipsia and polyuria.   Genitourinary: Negative for difficulty urinating, dysuria, flank pain and urgency.   Musculoskeletal: Negative for back pain and myalgias.   Skin: Negative for rash and wound.   Allergic/Immunologic: Negative for immunocompromised state.   Neurological: Negative for dizziness, seizures, syncope, light-headedness, numbness and headaches.   Hematological: Negative for adenopathy. Does not bruise/bleed easily.   Psychiatric/Behavioral: Negative for behavioral problems and confusion. The patient is not nervous/anxious.        Objective   Physical Exam   Constitutional: He is oriented to person, place, and time. He appears well-developed and well-nourished. He does not appear ill. No distress.       HENT:   Head: Normocephalic. Head is without laceration. Hair is normal.   Right Ear: Hearing and ear canal normal.   Left Ear: Hearing and ear canal  normal.   Nose: Nose normal. No sinus tenderness. No epistaxis. Right sinus exhibits no maxillary sinus tenderness and no frontal sinus tenderness. Left sinus exhibits no maxillary sinus tenderness and no frontal sinus tenderness.   Eyes: Conjunctivae and lids are normal. Pupils are equal, round, and reactive to light.   Neck: Normal range of motion. No JVD present. No tracheal tenderness present. No tracheal deviation present. No thyroid mass and no thyromegaly present.   Cardiovascular: Normal rate and regular rhythm. Exam reveals no gallop.   No murmur heard.  Pulmonary/Chest: Effort normal and breath sounds normal. No stridor. He has no wheezes. He exhibits no tenderness.   Abdominal: Soft. Bowel sounds are normal. He exhibits no distension, no ascites and no mass. There is no tenderness. There is no rebound and no guarding. No hernia.   Musculoskeletal: He exhibits no edema or deformity.   Lymphadenopathy:     He has no cervical adenopathy.     He has no axillary adenopathy.        Right: No inguinal and no supraclavicular adenopathy present.        Left: No inguinal and no supraclavicular adenopathy present.   Neurological: He is alert and oriented to person, place, and time. He exhibits normal muscle tone.   Skin: Skin is warm, dry and intact. No rash noted. No erythema. No pallor.   Psychiatric: He has a normal mood and affect. His behavior is normal. Thought content normal.   Vitals reviewed.      Assessment/Plan   Devan was seen today for hernia.    Diagnoses and all orders for this visit:    Incisional hernia, without obstruction or gangrene    repair hernia laparoscopically    I advised Devan of the risks of continuing to use tobacco, and I provided him with tobacco cessation educational materials in the After Visit Summary.     During this visit, I spent 3 minutes counseling the patient regarding tobacco cessation.

## 2019-05-06 PROBLEM — K43.2 INCISIONAL HERNIA, WITHOUT OBSTRUCTION OR GANGRENE: Status: ACTIVE | Noted: 2019-05-06

## 2019-05-30 ENCOUNTER — APPOINTMENT (OUTPATIENT)
Dept: PREADMISSION TESTING | Facility: HOSPITAL | Age: 44
End: 2019-05-30

## 2019-06-27 ENCOUNTER — OFFICE VISIT (OUTPATIENT)
Dept: FAMILY MEDICINE CLINIC | Facility: CLINIC | Age: 44
End: 2019-06-27

## 2019-06-27 VITALS
DIASTOLIC BLOOD PRESSURE: 84 MMHG | HEIGHT: 74 IN | WEIGHT: 193.6 LBS | OXYGEN SATURATION: 98 % | TEMPERATURE: 97.8 F | HEART RATE: 83 BPM | SYSTOLIC BLOOD PRESSURE: 140 MMHG | BODY MASS INDEX: 24.85 KG/M2

## 2019-06-27 DIAGNOSIS — Z98.890 HISTORY OF ESOPHAGECTOMY: ICD-10-CM

## 2019-06-27 DIAGNOSIS — Z90.49 HISTORY OF ESOPHAGECTOMY: ICD-10-CM

## 2019-06-27 DIAGNOSIS — K21.9 GASTROESOPHAGEAL REFLUX DISEASE, ESOPHAGITIS PRESENCE NOT SPECIFIED: ICD-10-CM

## 2019-06-27 DIAGNOSIS — R05.9 COUGH: Primary | ICD-10-CM

## 2019-06-27 DIAGNOSIS — Z23 NEED FOR HEPATITIS A VACCINATION: ICD-10-CM

## 2019-06-27 PROBLEM — K57.30 DIVERTICULOSIS OF LARGE INTESTINE: Status: ACTIVE | Noted: 2019-06-27

## 2019-06-27 PROCEDURE — 90632 HEPA VACCINE ADULT IM: CPT | Performed by: FAMILY MEDICINE

## 2019-06-27 PROCEDURE — 99213 OFFICE O/P EST LOW 20 MIN: CPT | Performed by: FAMILY MEDICINE

## 2019-06-27 PROCEDURE — 90471 IMMUNIZATION ADMIN: CPT | Performed by: FAMILY MEDICINE

## 2019-06-27 RX ORDER — BENZONATATE 100 MG/1
100 CAPSULE ORAL 3 TIMES DAILY PRN
Qty: 30 CAPSULE | Refills: 0 | Status: SHIPPED | OUTPATIENT
Start: 2019-06-27 | End: 2019-08-27

## 2019-06-27 NOTE — PROGRESS NOTES
Subjective   Devan Alarcon is a 43 y.o. male.     History of Present Illness follow-up.  About a 2-week history of nonproductive cough.  Wonders about silent reflux.  Needs second hep A.  Has visited with surgeon will be undergoing outpatient elective hernia repair incisional.  Denies shortness of breath chest pain palpitations vomiting.  Has the dumping syndrome as expected.  Denies urinary symptoms.  Medications are minimal.  Had CT chest imaging about 2 months ago.  The cough seemingly has occurred since then.  Will follow with GI routinely.  Follow with oncologist episodically.    The following portions of the patient's history were reviewed and updated as appropriate: allergies, past family history, past medical history, past social history, past surgical history and problem list.    Review of Systems  See history of Present Illness     Objective     Physical Exam   Constitutional: He is oriented to person, place, and time. He appears well-developed and well-nourished.   HENT:   Head: Normocephalic.   Mouth/Throat: Oropharynx is clear and moist.   Eyes: Conjunctivae and EOM are normal. Pupils are equal, round, and reactive to light.   Neck: Normal range of motion. Neck supple. No tracheal deviation present. No thyromegaly present.   Cardiovascular: Normal rate, regular rhythm and normal heart sounds.   No murmur heard.  Pulmonary/Chest: Effort normal and breath sounds normal. No respiratory distress. He has no wheezes.   Abdominal: Soft. Bowel sounds are normal. He exhibits no distension. There is no guarding.   Incisional hernia   Musculoskeletal: He exhibits no edema.   Neurological: He is alert and oriented to person, place, and time.   Skin: Skin is warm and dry.   Psychiatric: He has a normal mood and affect.   Vitals reviewed.      PHQ-9 Total Score:      Patient's Body mass index is 24.86 kg/m². BMI is within normal parameters. No follow-up required..   (Normal BMI:  18.5-24.9, OW 25-29.9, Obesity 30 or  greater)      Assessment/Plan     Jack was seen today for 2nd hep a, cough and hernia.    Diagnoses and all orders for this visit:    Cough  -     benzonatate (TESSALON PERLES) 100 MG capsule; Take 1 capsule by mouth 3 (Three) Times a Day As Needed for Cough.  -     XR Chest PA & Lateral; Future    Gastroesophageal reflux disease, esophagitis presence not specified    History of esophagectomy  -     XR Chest PA & Lateral; Future    Need for hepatitis A vaccination  -     Hepatitis A Vaccine Adult  (VAQTA) - Dose 2    Continue medications as reconciled ordered.  Recent lab unremarkable.  Anemia improved.  Will check outpatient chest x-ray.  Second hep A vaccine given.  Utilize the inhaler that is available.  I do not believe this definitely represents infection.  Will notify of results and further recommendations.  Follow-up in a few months routinely.                     This document has been electronically signed by Jaswinder Zheng MD   June 27, 2019 3:56 PM

## 2019-07-05 ENCOUNTER — HOSPITAL ENCOUNTER (OUTPATIENT)
Dept: GENERAL RADIOLOGY | Facility: HOSPITAL | Age: 44
Discharge: HOME | End: 2019-07-05
Admitting: FAMILY MEDICINE

## 2019-07-05 DIAGNOSIS — R05.9 COUGH: ICD-10-CM

## 2019-07-05 DIAGNOSIS — Z90.49 HISTORY OF ESOPHAGECTOMY: ICD-10-CM

## 2019-07-05 DIAGNOSIS — Z98.890 HISTORY OF ESOPHAGECTOMY: ICD-10-CM

## 2019-07-05 PROCEDURE — 71046 X-RAY EXAM CHEST 2 VIEWS: CPT | Performed by: RADIOLOGY

## 2019-07-05 PROCEDURE — 71046 X-RAY EXAM CHEST 2 VIEWS: CPT

## 2019-07-07 NOTE — PROGRESS NOTES
Let him know that the chest x-ray was reported as clear.  Contact us or follow-up if problems persist.

## 2019-07-08 ENCOUNTER — TELEPHONE (OUTPATIENT)
Dept: SURGERY | Facility: CLINIC | Age: 44
End: 2019-07-08

## 2019-07-08 NOTE — TELEPHONE ENCOUNTER
No answer when I called to remind the patient that he was scheduled for Hernia surgery 7/12 @ 630 with Dr. Goodrich. His PAT is 7/10@ 130 at outpatient surgery.

## 2019-07-10 ENCOUNTER — APPOINTMENT (OUTPATIENT)
Dept: PREADMISSION TESTING | Facility: HOSPITAL | Age: 44
End: 2019-07-10

## 2019-07-10 VITALS — BODY MASS INDEX: 25.03 KG/M2 | HEIGHT: 74 IN | WEIGHT: 195 LBS

## 2019-07-10 LAB
ANION GAP SERPL CALCULATED.3IONS-SCNC: 10.2 MMOL/L (ref 5–15)
BASOPHILS # BLD AUTO: 0.01 10*3/MM3 (ref 0–0.2)
BASOPHILS NFR BLD AUTO: 0.1 % (ref 0–1.5)
BUN BLD-MCNC: 11 MG/DL (ref 6–20)
BUN/CREAT SERPL: 12.9 (ref 7–25)
CALCIUM SPEC-SCNC: 9.9 MG/DL (ref 8.6–10.5)
CHLORIDE SERPL-SCNC: 103 MMOL/L (ref 98–107)
CO2 SERPL-SCNC: 28.8 MMOL/L (ref 22–29)
CREAT BLD-MCNC: 0.85 MG/DL (ref 0.76–1.27)
DEPRECATED RDW RBC AUTO: 52.9 FL (ref 37–54)
EOSINOPHIL # BLD AUTO: 0.25 10*3/MM3 (ref 0–0.4)
EOSINOPHIL NFR BLD AUTO: 3.7 % (ref 0.3–6.2)
ERYTHROCYTE [DISTWIDTH] IN BLOOD BY AUTOMATED COUNT: 16.2 % (ref 12.3–15.4)
GFR SERPL CREATININE-BSD FRML MDRD: 98 ML/MIN/1.73
GLUCOSE BLD-MCNC: 85 MG/DL (ref 65–99)
HCT VFR BLD AUTO: 51.1 % (ref 37.5–51)
HGB BLD-MCNC: 16.1 G/DL (ref 13–17.7)
IMM GRANULOCYTES # BLD AUTO: 0.01 10*3/MM3 (ref 0–0.05)
IMM GRANULOCYTES NFR BLD AUTO: 0.1 % (ref 0–0.5)
LYMPHOCYTES # BLD AUTO: 2.02 10*3/MM3 (ref 0.7–3.1)
LYMPHOCYTES NFR BLD AUTO: 29.7 % (ref 19.6–45.3)
MCH RBC QN AUTO: 28.2 PG (ref 26.6–33)
MCHC RBC AUTO-ENTMCNC: 31.5 G/DL (ref 31.5–35.7)
MCV RBC AUTO: 89.6 FL (ref 79–97)
MONOCYTES # BLD AUTO: 0.57 10*3/MM3 (ref 0.1–0.9)
MONOCYTES NFR BLD AUTO: 8.4 % (ref 5–12)
NEUTROPHILS # BLD AUTO: 3.95 10*3/MM3 (ref 1.7–7)
NEUTROPHILS NFR BLD AUTO: 58 % (ref 42.7–76)
PLATELET # BLD AUTO: 246 10*3/MM3 (ref 140–450)
PMV BLD AUTO: 10.9 FL (ref 6–12)
POTASSIUM BLD-SCNC: 4.3 MMOL/L (ref 3.5–5.2)
RBC # BLD AUTO: 5.7 10*6/MM3 (ref 4.14–5.8)
SODIUM BLD-SCNC: 142 MMOL/L (ref 136–145)
WBC NRBC COR # BLD: 6.81 10*3/MM3 (ref 3.4–10.8)

## 2019-07-10 PROCEDURE — 36415 COLL VENOUS BLD VENIPUNCTURE: CPT

## 2019-07-10 PROCEDURE — 85025 COMPLETE CBC W/AUTO DIFF WBC: CPT | Performed by: ANESTHESIOLOGY

## 2019-07-10 PROCEDURE — 80048 BASIC METABOLIC PNL TOTAL CA: CPT | Performed by: ANESTHESIOLOGY

## 2019-07-10 NOTE — DISCHARGE INSTRUCTIONS
TAKE the following medications the morning of surgery:  All heart or blood pressure medications    HOLD all diabetic medications the morning of surgery as ordered by physician.    Please discontinue all blood thinners and anticoagulants (except aspirin) prior to surgery as per your surgeon and cardiologist instructions.  Aspirin may be continued up to the day prior to surgery.     CHLORHEXIDINE CLOTHS GIVEN WITH INSTRUCTIONS AND FORM TO RETURN TO HOSPITAL    General Instructions:  · Do not eat or drink after midnight: includes water, mints, or gum. You may brush your teeth.  Dental appliances that are removable must be taken out day of surgery.  · Do not smoke, chew tobacco, or drink alcohol.  · Bring medications in original bottles, any inhalers and if applicable your C-PAP/BI-PAP machine.  · Bring any papers given to you in the doctor's office.  · Wear clean comfortable clothes and socks.  · Do not wear contact lenses or make-up. Bring a case for your glasses if applicable.  · Bring crutches or walker if applicable.  · Leave all other valuables and jewelry at home.    If you were given a blood bank ID arm band remember to bring it with you the day of surgery.    Preventing a Surgical Site Infection:  Shower the night before surgery (unless instructed other wise) using a fresh bar of anti-bacterial soap (such as Dial) and clean washcloth. Dry with a clean towel and dress in clean clothing.  For 2 to 3 days before surgery, avoid shaving with a razor near where you will have surgery because the razor can irritate skin and make it easier to develop an infection. Ask your surgeon if you will be receiving antibiotics prior to surgery.  Make sure you, your family, and all healthcare providers clear their hands with soap and water or an alcohol-based hand  before caring for you or your wound.  If at all possible, quit smoking as many days before surgery as you can.    Day of surgery:  Upon arrival, a Pre-op nurse  and Anesthesiologist will review your health history, obtain vital signs, and answer questions you may have. The only belongings needed at this time will be your home medications and if applicable your C-PAP/BI-PAP machine. If you are staying overnight your family can leave the rest of your belongings in the car and bring them to your room later. A Pre-op nurse will start an IV and you may receive medication in preparation for surgery, including something to help you relax. Your family will be able to see you in the Pre-op area. While you are in surgery your family should notify the waiting room  if they leave the waiting room area and provide a contact phone number.    Please be aware that surgery does come with discomfort. We want to make every effort to control your discomfort so please discuss any uncontrolled symptoms with your nurse. Your doctor will most likely have prescribed pain medications.    If you are going home after surgery you will receive individualized written care instructions before being discharged. A responsible adult must drive you to and from the hospital on the day of surgery and stay with you for 24 hours.    If you are staying overnight following surgery, you will be transported to your hospital room following the recovery period.  [facility] has all private rooms.    If you have any questions please call Pre-Admission Testing at [phone].  Deductibles and co-payments are collected on the day of service. Please be prepared to pay the required co-pay, deductible or deposit on the day of service as defined by your plan.

## 2019-07-11 ENCOUNTER — OFFICE VISIT (OUTPATIENT)
Dept: SURGERY | Facility: CLINIC | Age: 44
End: 2019-07-11

## 2019-07-11 VITALS — BODY MASS INDEX: 25.03 KG/M2 | WEIGHT: 195 LBS | HEIGHT: 74 IN

## 2019-07-11 DIAGNOSIS — K43.2 INCISIONAL HERNIA, WITHOUT OBSTRUCTION OR GANGRENE: Primary | ICD-10-CM

## 2019-07-11 PROCEDURE — 99213 OFFICE O/P EST LOW 20 MIN: CPT | Performed by: SURGERY

## 2019-07-11 NOTE — H&P (VIEW-ONLY)
History of Present Illness He has had a esophagectomy and had done well, but recently he has developed a lump in the epigastrium where the scar is. It is mildly sore.      The following portions of the patient's history were reviewed and updated as appropriate: current medications, past family history, past medical history, past social history, past surgical history and problem list.      Review of Systems   Constitutional: Negative for activity change, appetite change, chills, fever and unexpected weight change.   HENT: Negative for congestion, facial swelling and sore throat.    Eyes: Negative for photophobia and visual disturbance.   Respiratory: Negative for chest tightness, shortness of breath and wheezing.    Cardiovascular: Negative for chest pain, palpitations and leg swelling.   Gastrointestinal: Negative for abdominal distention, abdominal pain, anal bleeding, blood in stool, constipation, diarrhea, nausea, rectal pain and vomiting.   Endocrine: Negative for cold intolerance, heat intolerance, polydipsia and polyuria.   Genitourinary: Negative for difficulty urinating, dysuria, flank pain and urgency.   Musculoskeletal: Negative for back pain and myalgias.   Skin: Negative for rash and wound.   Allergic/Immunologic: Negative for immunocompromised state.   Neurological: Negative for dizziness, seizures, syncope, light-headedness, numbness and headaches.   Hematological: Negative for adenopathy. Does not bruise/bleed easily.   Psychiatric/Behavioral: Negative for behavioral problems and confusion. The patient is not nervous/anxious.          Objective   Physical Exam   Constitutional: He is oriented to person, place, and time. He appears well-developed and well-nourished. He does not appear ill. No distress.       HENT:   Head: Normocephalic. Head is without laceration. Hair is normal.   Right Ear: Hearing and ear canal normal.   Left Ear: Hearing and ear canal normal.   Nose: Nose normal. No sinus  tenderness. No epistaxis. Right sinus exhibits no maxillary sinus tenderness and no frontal sinus tenderness. Left sinus exhibits no maxillary sinus tenderness and no frontal sinus tenderness.   Eyes: Conjunctivae and lids are normal. Pupils are equal, round, and reactive to light.   Neck: Normal range of motion. No JVD present. No tracheal tenderness present. No tracheal deviation present. No thyroid mass and no thyromegaly present.   Cardiovascular: Normal rate and regular rhythm. Exam reveals no gallop.   No murmur heard.  Pulmonary/Chest: Effort normal and breath sounds normal. No stridor. He has no wheezes. He exhibits no tenderness.   Abdominal: Soft. Bowel sounds are normal. He exhibits no distension, no ascites and no mass. There is no tenderness. There is no rebound and no guarding. No hernia.   Musculoskeletal: He exhibits no edema or deformity.   Lymphadenopathy:     He has no cervical adenopathy.     He has no axillary adenopathy.        Right: No inguinal and no supraclavicular adenopathy present.        Left: No inguinal and no supraclavicular adenopathy present.   Neurological: He is alert and oriented to person, place, and time. He exhibits normal muscle tone.   Skin: Skin is warm, dry and intact. No rash noted. No erythema. No pallor.   Psychiatric: He has a normal mood and affect. His behavior is normal. Thought content normal.   Vitals reviewed.        Assessment/Plan   Devan was seen today for hernia.     Diagnoses and all orders for this visit:     Incisional hernia, without obstruction or gangrene     repair hernia laparoscopically

## 2019-07-12 ENCOUNTER — ANESTHESIA (OUTPATIENT)
Dept: PERIOP | Facility: HOSPITAL | Age: 44
End: 2019-07-12

## 2019-07-12 ENCOUNTER — HOSPITAL ENCOUNTER (OUTPATIENT)
Facility: HOSPITAL | Age: 44
Setting detail: HOSPITAL OUTPATIENT SURGERY
Discharge: HOME | End: 2019-07-12
Attending: SURGERY | Admitting: SURGERY

## 2019-07-12 ENCOUNTER — ANESTHESIA EVENT (OUTPATIENT)
Dept: PERIOP | Facility: HOSPITAL | Age: 44
End: 2019-07-12

## 2019-07-12 VITALS
BODY MASS INDEX: 24.69 KG/M2 | RESPIRATION RATE: 16 BRPM | HEART RATE: 95 BPM | OXYGEN SATURATION: 99 % | TEMPERATURE: 97.1 F | SYSTOLIC BLOOD PRESSURE: 129 MMHG | WEIGHT: 192.4 LBS | DIASTOLIC BLOOD PRESSURE: 89 MMHG | HEIGHT: 74 IN

## 2019-07-12 DIAGNOSIS — K43.2 INCISIONAL HERNIA, WITHOUT OBSTRUCTION OR GANGRENE: ICD-10-CM

## 2019-07-12 PROCEDURE — 25010000002 HYDROMORPHONE 1 MG/ML SOLUTION: Performed by: ANESTHESIOLOGY

## 2019-07-12 PROCEDURE — 25010000002 FENTANYL CITRATE (PF) 100 MCG/2ML SOLUTION: Performed by: NURSE ANESTHETIST, CERTIFIED REGISTERED

## 2019-07-12 PROCEDURE — 25010000002 ONDANSETRON PER 1 MG: Performed by: NURSE ANESTHETIST, CERTIFIED REGISTERED

## 2019-07-12 PROCEDURE — 25010000002 PROPOFOL 10 MG/ML EMULSION: Performed by: NURSE ANESTHETIST, CERTIFIED REGISTERED

## 2019-07-12 PROCEDURE — 25010000002 HYDROMORPHONE PER 4 MG: Performed by: NURSE ANESTHETIST, CERTIFIED REGISTERED

## 2019-07-12 PROCEDURE — 49654 PR LAP, INCISIONAL HERNIA REPAIR,REDUCIBLE: CPT | Performed by: SURGERY

## 2019-07-12 PROCEDURE — 25010000002 MIDAZOLAM PER 1 MG: Performed by: NURSE ANESTHETIST, CERTIFIED REGISTERED

## 2019-07-12 PROCEDURE — C1781 MESH (IMPLANTABLE): HCPCS | Performed by: SURGERY

## 2019-07-12 PROCEDURE — 25010000002 NEOSTIGMINE 10 MG/10ML SOLUTION: Performed by: NURSE ANESTHETIST, CERTIFIED REGISTERED

## 2019-07-12 DEVICE — VENTRALIGHT ST MESH WITH ECHO PS POSITIONING SYSTEM
Type: IMPLANTABLE DEVICE | Site: ABDOMEN | Status: FUNCTIONAL
Brand: VENTRALIGHT ST MESH WITH ECHO PS POSITIONING SYSTEM

## 2019-07-12 RX ORDER — OXYCODONE AND ACETAMINOPHEN 10; 325 MG/1; MG/1
1 TABLET ORAL EVERY 4 HOURS PRN
Qty: 30 TABLET | Refills: 0 | Status: SHIPPED | OUTPATIENT
Start: 2019-07-12 | End: 2019-07-22

## 2019-07-12 RX ORDER — FAMOTIDINE 10 MG/ML
INJECTION, SOLUTION INTRAVENOUS AS NEEDED
Status: DISCONTINUED | OUTPATIENT
Start: 2019-07-12 | End: 2019-07-12 | Stop reason: SURG

## 2019-07-12 RX ORDER — FENTANYL CITRATE 50 UG/ML
50 INJECTION, SOLUTION INTRAMUSCULAR; INTRAVENOUS
Status: COMPLETED | OUTPATIENT
Start: 2019-07-12 | End: 2019-07-12

## 2019-07-12 RX ORDER — MEPERIDINE HYDROCHLORIDE 25 MG/ML
12.5 INJECTION INTRAMUSCULAR; INTRAVENOUS; SUBCUTANEOUS
Status: COMPLETED | OUTPATIENT
Start: 2019-07-12 | End: 2019-07-12

## 2019-07-12 RX ORDER — BUPIVACAINE HYDROCHLORIDE AND EPINEPHRINE 5; 5 MG/ML; UG/ML
INJECTION, SOLUTION EPIDURAL; INTRACAUDAL; PERINEURAL AS NEEDED
Status: DISCONTINUED | OUTPATIENT
Start: 2019-07-12 | End: 2019-07-12 | Stop reason: HOSPADM

## 2019-07-12 RX ORDER — ROCURONIUM BROMIDE 10 MG/ML
INJECTION, SOLUTION INTRAVENOUS AS NEEDED
Status: DISCONTINUED | OUTPATIENT
Start: 2019-07-12 | End: 2019-07-12 | Stop reason: SURG

## 2019-07-12 RX ORDER — MAGNESIUM HYDROXIDE 1200 MG/15ML
LIQUID ORAL AS NEEDED
Status: DISCONTINUED | OUTPATIENT
Start: 2019-07-12 | End: 2019-07-12 | Stop reason: HOSPADM

## 2019-07-12 RX ORDER — SODIUM CHLORIDE 0.9 % (FLUSH) 0.9 %
3-10 SYRINGE (ML) INJECTION AS NEEDED
Status: DISCONTINUED | OUTPATIENT
Start: 2019-07-12 | End: 2019-07-12 | Stop reason: HOSPADM

## 2019-07-12 RX ORDER — HYDROMORPHONE HCL 110MG/55ML
PATIENT CONTROLLED ANALGESIA SYRINGE INTRAVENOUS AS NEEDED
Status: DISCONTINUED | OUTPATIENT
Start: 2019-07-12 | End: 2019-07-12 | Stop reason: SURG

## 2019-07-12 RX ORDER — MIDAZOLAM HYDROCHLORIDE 1 MG/ML
INJECTION INTRAMUSCULAR; INTRAVENOUS AS NEEDED
Status: DISCONTINUED | OUTPATIENT
Start: 2019-07-12 | End: 2019-07-12 | Stop reason: SURG

## 2019-07-12 RX ORDER — OXYCODONE AND ACETAMINOPHEN 10; 325 MG/1; MG/1
1 TABLET ORAL EVERY 4 HOURS PRN
Status: DISCONTINUED | OUTPATIENT
Start: 2019-07-12 | End: 2019-07-12 | Stop reason: HOSPADM

## 2019-07-12 RX ORDER — OXYCODONE HYDROCHLORIDE AND ACETAMINOPHEN 5; 325 MG/1; MG/1
1 TABLET ORAL ONCE AS NEEDED
Status: COMPLETED | OUTPATIENT
Start: 2019-07-12 | End: 2019-07-12

## 2019-07-12 RX ORDER — ONDANSETRON 2 MG/ML
4 INJECTION INTRAMUSCULAR; INTRAVENOUS AS NEEDED
Status: DISCONTINUED | OUTPATIENT
Start: 2019-07-12 | End: 2019-07-12 | Stop reason: HOSPADM

## 2019-07-12 RX ORDER — SODIUM CHLORIDE, SODIUM LACTATE, POTASSIUM CHLORIDE, CALCIUM CHLORIDE 600; 310; 30; 20 MG/100ML; MG/100ML; MG/100ML; MG/100ML
125 INJECTION, SOLUTION INTRAVENOUS CONTINUOUS
Status: DISCONTINUED | OUTPATIENT
Start: 2019-07-12 | End: 2019-07-12 | Stop reason: HOSPADM

## 2019-07-12 RX ORDER — GLYCOPYRROLATE 0.2 MG/ML
INJECTION INTRAMUSCULAR; INTRAVENOUS AS NEEDED
Status: DISCONTINUED | OUTPATIENT
Start: 2019-07-12 | End: 2019-07-12 | Stop reason: SURG

## 2019-07-12 RX ORDER — ONDANSETRON 2 MG/ML
INJECTION INTRAMUSCULAR; INTRAVENOUS AS NEEDED
Status: DISCONTINUED | OUTPATIENT
Start: 2019-07-12 | End: 2019-07-12 | Stop reason: SURG

## 2019-07-12 RX ORDER — SODIUM CHLORIDE 9 MG/ML
INJECTION, SOLUTION INTRAVENOUS AS NEEDED
Status: DISCONTINUED | OUTPATIENT
Start: 2019-07-12 | End: 2019-07-12 | Stop reason: HOSPADM

## 2019-07-12 RX ORDER — PROPOFOL 10 MG/ML
VIAL (ML) INTRAVENOUS AS NEEDED
Status: DISCONTINUED | OUTPATIENT
Start: 2019-07-12 | End: 2019-07-12 | Stop reason: SURG

## 2019-07-12 RX ORDER — SODIUM CHLORIDE 0.9 % (FLUSH) 0.9 %
3 SYRINGE (ML) INJECTION EVERY 12 HOURS SCHEDULED
Status: DISCONTINUED | OUTPATIENT
Start: 2019-07-12 | End: 2019-07-12 | Stop reason: HOSPADM

## 2019-07-12 RX ORDER — NEOSTIGMINE METHYLSULFATE 1 MG/ML
INJECTION, SOLUTION INTRAVENOUS AS NEEDED
Status: DISCONTINUED | OUTPATIENT
Start: 2019-07-12 | End: 2019-07-12 | Stop reason: SURG

## 2019-07-12 RX ORDER — IPRATROPIUM BROMIDE AND ALBUTEROL SULFATE 2.5; .5 MG/3ML; MG/3ML
3 SOLUTION RESPIRATORY (INHALATION) ONCE AS NEEDED
Status: DISCONTINUED | OUTPATIENT
Start: 2019-07-12 | End: 2019-07-12 | Stop reason: HOSPADM

## 2019-07-12 RX ORDER — FENTANYL CITRATE 50 UG/ML
INJECTION, SOLUTION INTRAMUSCULAR; INTRAVENOUS AS NEEDED
Status: DISCONTINUED | OUTPATIENT
Start: 2019-07-12 | End: 2019-07-12 | Stop reason: SURG

## 2019-07-12 RX ADMIN — ROCURONIUM BROMIDE 40 MG: 10 INJECTION INTRAVENOUS at 07:31

## 2019-07-12 RX ADMIN — HYDROMORPHONE HYDROCHLORIDE 1 MG: 1 INJECTION, SOLUTION INTRAMUSCULAR; INTRAVENOUS; SUBCUTANEOUS at 09:42

## 2019-07-12 RX ADMIN — PROPOFOL 150 MG: 10 INJECTION, EMULSION INTRAVENOUS at 07:31

## 2019-07-12 RX ADMIN — HYDROMORPHONE HYDROCHLORIDE 1 MG: 2 INJECTION, SOLUTION INTRAMUSCULAR; INTRAVENOUS; SUBCUTANEOUS at 08:05

## 2019-07-12 RX ADMIN — FENTANYL CITRATE 50 MCG: 50 INJECTION INTRAMUSCULAR; INTRAVENOUS at 09:24

## 2019-07-12 RX ADMIN — OXYCODONE HYDROCHLORIDE AND ACETAMINOPHEN 1 TABLET: 5; 325 TABLET ORAL at 10:06

## 2019-07-12 RX ADMIN — SODIUM CHLORIDE, POTASSIUM CHLORIDE, SODIUM LACTATE AND CALCIUM CHLORIDE: 600; 310; 30; 20 INJECTION, SOLUTION INTRAVENOUS at 07:25

## 2019-07-12 RX ADMIN — MEPERIDINE HYDROCHLORIDE 12.5 MG: 25 INJECTION INTRAMUSCULAR; INTRAVENOUS; SUBCUTANEOUS at 09:42

## 2019-07-12 RX ADMIN — NEOSTIGMINE METHYLSULFATE 2 MG: 1 INJECTION, SOLUTION INTRAVENOUS at 09:00

## 2019-07-12 RX ADMIN — MEPERIDINE HYDROCHLORIDE 12.5 MG: 25 INJECTION INTRAMUSCULAR; INTRAVENOUS; SUBCUTANEOUS at 09:37

## 2019-07-12 RX ADMIN — SODIUM CHLORIDE, POTASSIUM CHLORIDE, SODIUM LACTATE AND CALCIUM CHLORIDE 125 ML/HR: 600; 310; 30; 20 INJECTION, SOLUTION INTRAVENOUS at 07:00

## 2019-07-12 RX ADMIN — HYDROMORPHONE HYDROCHLORIDE 1 MG: 2 INJECTION, SOLUTION INTRAMUSCULAR; INTRAVENOUS; SUBCUTANEOUS at 08:34

## 2019-07-12 RX ADMIN — AMPICILLIN SODIUM AND SULBACTAM SODIUM 3 G: 2; 1 INJECTION, POWDER, FOR SOLUTION INTRAMUSCULAR; INTRAVENOUS at 07:30

## 2019-07-12 RX ADMIN — GLYCOPYRROLATE 0.4 MG: 0.2 INJECTION INTRAMUSCULAR; INTRAVENOUS at 09:00

## 2019-07-12 RX ADMIN — FAMOTIDINE 20 MG: 10 INJECTION, SOLUTION INTRAVENOUS at 07:25

## 2019-07-12 RX ADMIN — FENTANYL CITRATE 50 MCG: 50 INJECTION INTRAMUSCULAR; INTRAVENOUS at 09:28

## 2019-07-12 RX ADMIN — ONDANSETRON 4 MG: 2 INJECTION, SOLUTION INTRAMUSCULAR; INTRAVENOUS at 07:25

## 2019-07-12 RX ADMIN — MIDAZOLAM HYDROCHLORIDE 2 MG: 1 INJECTION, SOLUTION INTRAMUSCULAR; INTRAVENOUS at 07:25

## 2019-07-12 RX ADMIN — FENTANYL CITRATE 100 MCG: 50 INJECTION INTRAMUSCULAR; INTRAVENOUS at 07:25

## 2019-07-12 NOTE — OP NOTE
VENTRAL/INCISIONAL HERNIA REPAIR LAPAROSCOPIC  Procedure Note    Devan Alarcon  7/12/2019    Pre-op Diagnosis:   Incisional hernia, without obstruction or gangrene [K43.2]    Post-op Diagnosis:  same       Procedure(s):  VENTRAL/INCISIONAL HERNIA REPAIR LAPAROSCOPIC    Surgeon(s):  Jaswinder Goodrich MD    Anesthesia: Anesthesia type not filed in the log.    Staff:   Circulator: Nydia Niño RN  Scrub Person: Ankita Boucher  Assistant: Jaswinder Kothari    Estimated Blood Loss:  500    Specimens:                * No orders in the log *      Drains:      Procedure: The abdomen was prepped and draped. He had a 5 mm port placed in the umbilicus and a 11 mm port in the left mid abdomen. He had extensive adhesions of omentum and transverse colon to the large chevron scar area in the upper abdomen. These were mobilized with scissors and cautery, but bled a lot from the hernia area that mainly had omentum. It was in the midline with a fascial defect of about 4-5 cm. There were very dense adhesions going up on the left from his omentum where his stomach had been mobilized for his esophagectomy. These were mobilized and the falciform ligament taken down. The fascial defect would not be able to be closed. A large Bard patch was placed and the balloon inflated. The patch was pulled up and numerous tacks placed. Another 5 mm port was placed in the right side for placing tacks as well. Several vicryl sutures were placed around the edges and more tacks were placed. There was bleeding from a tack near the costal margin just right of the midline so a suture was placed around it and is stopped. The area was irrigated and suctioned some and no more bleeding was seen. The gas was allowed to escape and the ports closed with vicryl. Local was injected around the incisions and dressings applied.     Findings: extensive adhesions    Complications: none   Grafts / Implants N/A    Jaswinder Goodrich MD     Date: 7/12/2019  Time: 9:08 AM

## 2019-07-12 NOTE — ANESTHESIA POSTPROCEDURE EVALUATION
Patient: Devan Alarcon    Procedure Summary     Date:  07/12/19 Room / Location:  [facility] OR 01 /  [facility] OR    Anesthesia Start:  0728 Anesthesia Stop:  0912    Procedure:  VENTRAL/INCISIONAL HERNIA REPAIR LAPAROSCOPIC (N/A Abdomen) Diagnosis:       Incisional hernia, without obstruction or gangrene      (Incisional hernia, without obstruction or gangrene [K43.2])    Surgeon:  Jaswinder Goodrich MD Provider:  Conor Kidd MD    Anesthesia Type:  general ASA Status:  2          Anesthesia Type: general  Last vitals  BP   146/64 (07/12/19 0928)   Temp   97.3 °F (36.3 °C) (07/12/19 0913)   Pulse   99 (07/12/19 0928)   Resp   12 (07/12/19 0928)     SpO2   94 % (07/12/19 0928)     Post Anesthesia Care and Evaluation    Patient location during evaluation: PHASE II  Patient participation: complete - patient participated  Level of consciousness: awake and alert  Pain score: 1  Pain management: adequate  Airway patency: patent  Anesthetic complications: No anesthetic complications  PONV Status: controlled  Cardiovascular status: acceptable  Respiratory status: acceptable  Hydration status: acceptable

## 2019-07-12 NOTE — ANESTHESIA PREPROCEDURE EVALUATION
Anesthesia Evaluation     Patient summary reviewed and Nursing notes reviewed   no history of anesthetic complications:  NPO Solid Status: > 8 hours  NPO Liquid Status: > 8 hours           Airway   Mallampati: II  TM distance: >3 FB  Neck ROM: full  No difficulty expected  Dental - normal exam   (+) edentulous, lower dentures and upper dentures    Pulmonary - normal exam   (+) a smoker Current Abstained day of surgery,   Cardiovascular - normal exam  Exercise tolerance: good (4-7 METS)    NYHA Classification: II    (+) dysrhythmias Atrial Fib, hyperlipidemia,       Neuro/Psych  (+) headaches, psychiatric history Anxiety,     GI/Hepatic/Renal/Endo    (+)  GERD,    (-) GI bleed    Musculoskeletal     Abdominal  - normal exam    Bowel sounds: normal.   Substance History - negative use  (-) alcohol use, drug use     OB/GYN negative ob/gyn ROS         Other   (+) arthritis   history of cancer remission    ROS/Med Hx Other: No chemo or XRT.                      Anesthesia Plan    ASA 2     general   (Pt describes passive GERD when supine.   Prefers increase HOB 30 degrees to avoid silent aspiration.  Will place OGT intraop to suction gastric contents prior to awakening.  )  intravenous induction   Anesthetic plan, all risks, benefits, and alternatives have been provided, discussed and informed consent has been obtained with: patient.  Use of blood products discussed with patient  Consented to blood products.   Plan discussed with CRNA.

## 2019-07-12 NOTE — ANESTHESIA PROCEDURE NOTES
Airway  Urgency: elective    Date/Time: 7/12/2019 7:43 AM  Airway not difficult    General Information and Staff    Patient location during procedure: OR  Anesthesiologist: Conor Kidd MD  CRNA: Yani Agrawal CRNA    Indications and Patient Condition  Indications for airway management: airway protection    Preoxygenated: yes  MILS maintained throughout  Mask difficulty assessment: 1 - vent by mask    Final Airway Details  Final airway type: endotracheal airway      Successful airway: ETT  Cuffed: yes   Successful intubation technique: direct laryngoscopy  Endotracheal tube insertion site: oral  Blade: Earl  Blade size: 2  ETT size (mm): 7.5  Cormack-Lehane Classification: grade I - full view of glottis  Placement verified by: chest auscultation   Measured from: lips  ETT to lips (cm): 20  Number of attempts at approach: 1

## 2019-07-13 ENCOUNTER — APPOINTMENT (OUTPATIENT)
Dept: CT IMAGING | Facility: HOSPITAL | Age: 44
End: 2019-07-13

## 2019-07-13 ENCOUNTER — HOSPITAL ENCOUNTER (EMERGENCY)
Facility: HOSPITAL | Age: 44
Discharge: HOME | End: 2019-07-13
Attending: EMERGENCY MEDICINE | Admitting: EMERGENCY MEDICINE

## 2019-07-13 VITALS
RESPIRATION RATE: 16 BRPM | SYSTOLIC BLOOD PRESSURE: 125 MMHG | WEIGHT: 193 LBS | TEMPERATURE: 98.4 F | HEIGHT: 74 IN | HEART RATE: 72 BPM | BODY MASS INDEX: 24.77 KG/M2 | OXYGEN SATURATION: 96 % | DIASTOLIC BLOOD PRESSURE: 84 MMHG

## 2019-07-13 DIAGNOSIS — G89.18 POSTOPERATIVE PAIN: Primary | ICD-10-CM

## 2019-07-13 LAB
A-A DO2: 23.7 MMHG (ref 0–300)
ALBUMIN SERPL-MCNC: 3.72 G/DL (ref 3.5–5.2)
ALBUMIN/GLOB SERPL: 1 G/DL
ALP SERPL-CCNC: 94 U/L (ref 39–117)
ALT SERPL W P-5'-P-CCNC: 24 U/L (ref 1–41)
ANION GAP SERPL CALCULATED.3IONS-SCNC: 12.2 MMOL/L (ref 5–15)
APTT PPP: 31.1 SECONDS (ref 23.8–36.1)
ARTERIAL PATENCY WRIST A: ABNORMAL
AST SERPL-CCNC: 23 U/L (ref 1–40)
ATMOSPHERIC PRESS: 726 MMHG
BASE EXCESS BLDA CALC-SCNC: 1.5 MMOL/L
BASOPHILS # BLD AUTO: 0.01 10*3/MM3 (ref 0–0.2)
BASOPHILS NFR BLD AUTO: 0.1 % (ref 0–1.5)
BDY SITE: ABNORMAL
BILIRUB SERPL-MCNC: 0.9 MG/DL (ref 0.2–1.2)
BODY TEMPERATURE: 98.6 C
BUN BLD-MCNC: 9 MG/DL (ref 6–20)
BUN/CREAT SERPL: 9.5 (ref 7–25)
CALCIUM SPEC-SCNC: 9.2 MG/DL (ref 8.6–10.5)
CHLORIDE SERPL-SCNC: 97 MMOL/L (ref 98–107)
CO2 SERPL-SCNC: 26.8 MMOL/L (ref 22–29)
COHGB MFR BLD: 2.3 % (ref 0–5)
CREAT BLD-MCNC: 0.95 MG/DL (ref 0.76–1.27)
D-LACTATE SERPL-SCNC: 1.3 MMOL/L (ref 0.5–2)
DEPRECATED RDW RBC AUTO: 53.9 FL (ref 37–54)
EOSINOPHIL # BLD AUTO: 0.07 10*3/MM3 (ref 0–0.4)
EOSINOPHIL NFR BLD AUTO: 0.6 % (ref 0.3–6.2)
ERYTHROCYTE [DISTWIDTH] IN BLOOD BY AUTOMATED COUNT: 16.6 % (ref 12.3–15.4)
GFR SERPL CREATININE-BSD FRML MDRD: 86 ML/MIN/1.73
GLOBULIN UR ELPH-MCNC: 3.6 GM/DL
GLUCOSE BLD-MCNC: 124 MG/DL (ref 65–99)
HCO3 BLDA-SCNC: 26.6 MMOL/L (ref 22–26)
HCT VFR BLD AUTO: 47.5 % (ref 37.5–51)
HCT VFR BLD CALC: 48 % (ref 42–52)
HGB BLD-MCNC: 15.1 G/DL (ref 13–17.7)
HGB BLDA-MCNC: 16.3 G/DL (ref 12–16)
HOROWITZ INDEX BLD+IHG-RTO: 21 %
IMM GRANULOCYTES # BLD AUTO: 0.02 10*3/MM3 (ref 0–0.05)
IMM GRANULOCYTES NFR BLD AUTO: 0.2 % (ref 0–0.5)
INR PPP: 0.93 (ref 0.9–1.1)
LIPASE SERPL-CCNC: 24 U/L (ref 13–60)
LYMPHOCYTES # BLD AUTO: 1.83 10*3/MM3 (ref 0.7–3.1)
LYMPHOCYTES NFR BLD AUTO: 14.8 % (ref 19.6–45.3)
MCH RBC QN AUTO: 28.8 PG (ref 26.6–33)
MCHC RBC AUTO-ENTMCNC: 31.8 G/DL (ref 31.5–35.7)
MCV RBC AUTO: 90.5 FL (ref 79–97)
METHGB BLD QL: 0.1 % (ref 0–3)
MODALITY: ABNORMAL
MONOCYTES # BLD AUTO: 1.05 10*3/MM3 (ref 0.1–0.9)
MONOCYTES NFR BLD AUTO: 8.5 % (ref 5–12)
NEUTROPHILS # BLD AUTO: 9.42 10*3/MM3 (ref 1.7–7)
NEUTROPHILS NFR BLD AUTO: 75.8 % (ref 42.7–76)
OXYHGB MFR BLDV: 91.2 % (ref 85–100)
PCO2 BLDA: 43.5 MM HG (ref 35–45)
PH BLDA: 7.41 PH UNITS (ref 7.35–7.45)
PLATELET # BLD AUTO: 255 10*3/MM3 (ref 140–450)
PMV BLD AUTO: 10.9 FL (ref 6–12)
PO2 BLDA: 66.8 MM HG (ref 80–100)
POTASSIUM BLD-SCNC: 4.2 MMOL/L (ref 3.5–5.2)
PROT SERPL-MCNC: 7.3 G/DL (ref 6–8.5)
PROTHROMBIN TIME: 13 SECONDS (ref 11–15.4)
RBC # BLD AUTO: 5.25 10*6/MM3 (ref 4.14–5.8)
SAO2 % BLDCOA: 93.4 % (ref 90–100)
SODIUM BLD-SCNC: 136 MMOL/L (ref 136–145)
WBC NRBC COR # BLD: 12.4 10*3/MM3 (ref 3.4–10.8)

## 2019-07-13 PROCEDURE — 71260 CT THORAX DX C+: CPT | Performed by: RADIOLOGY

## 2019-07-13 PROCEDURE — 74177 CT ABD & PELVIS W/CONTRAST: CPT

## 2019-07-13 PROCEDURE — 36600 WITHDRAWAL OF ARTERIAL BLOOD: CPT | Performed by: EMERGENCY MEDICINE

## 2019-07-13 PROCEDURE — 85730 THROMBOPLASTIN TIME PARTIAL: CPT | Performed by: EMERGENCY MEDICINE

## 2019-07-13 PROCEDURE — 83050 HGB METHEMOGLOBIN QUAN: CPT | Performed by: EMERGENCY MEDICINE

## 2019-07-13 PROCEDURE — 85610 PROTHROMBIN TIME: CPT | Performed by: EMERGENCY MEDICINE

## 2019-07-13 PROCEDURE — 82375 ASSAY CARBOXYHB QUANT: CPT | Performed by: EMERGENCY MEDICINE

## 2019-07-13 PROCEDURE — 83690 ASSAY OF LIPASE: CPT | Performed by: EMERGENCY MEDICINE

## 2019-07-13 PROCEDURE — 96375 TX/PRO/DX INJ NEW DRUG ADDON: CPT

## 2019-07-13 PROCEDURE — 96361 HYDRATE IV INFUSION ADD-ON: CPT

## 2019-07-13 PROCEDURE — 0 IOVERSOL 68 % SOLUTION: Performed by: EMERGENCY MEDICINE

## 2019-07-13 PROCEDURE — 83605 ASSAY OF LACTIC ACID: CPT | Performed by: EMERGENCY MEDICINE

## 2019-07-13 PROCEDURE — 71260 CT THORAX DX C+: CPT

## 2019-07-13 PROCEDURE — 74177 CT ABD & PELVIS W/CONTRAST: CPT | Performed by: RADIOLOGY

## 2019-07-13 PROCEDURE — 80053 COMPREHEN METABOLIC PANEL: CPT | Performed by: EMERGENCY MEDICINE

## 2019-07-13 PROCEDURE — 96374 THER/PROPH/DIAG INJ IV PUSH: CPT

## 2019-07-13 PROCEDURE — 25010000002 ONDANSETRON PER 1 MG: Performed by: EMERGENCY MEDICINE

## 2019-07-13 PROCEDURE — 85025 COMPLETE CBC W/AUTO DIFF WBC: CPT | Performed by: EMERGENCY MEDICINE

## 2019-07-13 PROCEDURE — 25010000002 MORPHINE PER 10 MG: Performed by: EMERGENCY MEDICINE

## 2019-07-13 PROCEDURE — 87040 BLOOD CULTURE FOR BACTERIA: CPT | Performed by: EMERGENCY MEDICINE

## 2019-07-13 PROCEDURE — 82805 BLOOD GASES W/O2 SATURATION: CPT | Performed by: EMERGENCY MEDICINE

## 2019-07-13 PROCEDURE — 99284 EMERGENCY DEPT VISIT MOD MDM: CPT

## 2019-07-13 RX ORDER — SODIUM CHLORIDE 0.9 % (FLUSH) 0.9 %
10 SYRINGE (ML) INJECTION AS NEEDED
Status: DISCONTINUED | OUTPATIENT
Start: 2019-07-13 | End: 2019-07-13 | Stop reason: HOSPADM

## 2019-07-13 RX ORDER — PROMETHAZINE HYDROCHLORIDE 25 MG/1
25 SUPPOSITORY RECTAL EVERY 6 HOURS PRN
Qty: 12 SUPPOSITORY | Refills: 0 | Status: SHIPPED | OUTPATIENT
Start: 2019-07-13 | End: 2019-08-27 | Stop reason: ALTCHOICE

## 2019-07-13 RX ORDER — ONDANSETRON 4 MG/1
4 TABLET, ORALLY DISINTEGRATING ORAL 4 TIMES DAILY
Qty: 15 TABLET | Refills: 0 | Status: SHIPPED | OUTPATIENT
Start: 2019-07-13 | End: 2019-08-27 | Stop reason: SDUPTHER

## 2019-07-13 RX ORDER — ONDANSETRON 2 MG/ML
4 INJECTION INTRAMUSCULAR; INTRAVENOUS ONCE
Status: COMPLETED | OUTPATIENT
Start: 2019-07-13 | End: 2019-07-13

## 2019-07-13 RX ORDER — ACETAMINOPHEN 500 MG
1000 TABLET ORAL ONCE
Status: COMPLETED | OUTPATIENT
Start: 2019-07-13 | End: 2019-07-13

## 2019-07-13 RX ORDER — HYDROCODONE BITARTRATE AND ACETAMINOPHEN 7.5; 325 MG/1; MG/1
1 TABLET ORAL EVERY 4 HOURS PRN
Qty: 12 TABLET | Refills: 0 | Status: SHIPPED | OUTPATIENT
Start: 2019-07-13 | End: 2021-03-12

## 2019-07-13 RX ORDER — SODIUM CHLORIDE 9 MG/ML
125 INJECTION, SOLUTION INTRAVENOUS CONTINUOUS
Status: DISCONTINUED | OUTPATIENT
Start: 2019-07-13 | End: 2019-07-13 | Stop reason: HOSPADM

## 2019-07-13 RX ADMIN — MORPHINE SULFATE 4 MG: 4 INJECTION, SOLUTION INTRAMUSCULAR; INTRAVENOUS at 03:50

## 2019-07-13 RX ADMIN — IOVERSOL 90 ML: 678 INJECTION INTRA-ARTERIAL; INTRAVENOUS at 04:17

## 2019-07-13 RX ADMIN — SODIUM CHLORIDE 125 ML/HR: 9 INJECTION, SOLUTION INTRAVENOUS at 03:00

## 2019-07-13 RX ADMIN — SODIUM CHLORIDE 1000 ML: 9 INJECTION, SOLUTION INTRAVENOUS at 03:48

## 2019-07-13 RX ADMIN — ACETAMINOPHEN 1000 MG: 500 TABLET ORAL at 03:53

## 2019-07-13 RX ADMIN — ONDANSETRON 4 MG: 2 INJECTION, SOLUTION INTRAMUSCULAR; INTRAVENOUS at 03:49

## 2019-07-13 NOTE — ED PROVIDER NOTES
Subjective   Patient comes in with upper abdominal pain and subjective fever and chills.  He had a ventral hernia repair performed today.  He was performed here by Dr. Hamilton.        History provided by:  Patient and spouse  Fever   Temp source:  Subjective  Severity:  Severe  Onset quality:  Sudden  Timing:  Intermittent  Progression:  Waxing and waning  Chronicity:  New  Relieved by:  Nothing  Worsened by:  Nothing  Ineffective treatments:  None tried  Associated symptoms: chills    Associated symptoms: no chest pain, no confusion, no congestion, no cough, no diarrhea, no dysuria, no ear pain, no headaches, no myalgias, no nausea, no rash, no rhinorrhea, no somnolence, no sore throat and no vomiting    Risk factors: no contaminated food, no contaminated water, no hx of cancer, no immunosuppression, no occupational exposure, no recent sickness, no recent travel and no sick contacts        Review of Systems   Constitutional: Positive for activity change, appetite change, chills, diaphoresis, fatigue and fever.   HENT: Negative.  Negative for congestion, ear pain, rhinorrhea and sore throat.    Eyes: Negative.    Respiratory: Negative.  Negative for cough and chest tightness.    Cardiovascular: Negative.  Negative for chest pain.   Gastrointestinal: Positive for abdominal distention and abdominal pain. Negative for diarrhea, nausea and vomiting.   Endocrine: Negative.    Genitourinary: Negative.  Negative for dysuria.   Musculoskeletal: Negative.  Negative for myalgias.   Skin: Negative.  Negative for rash.   Allergic/Immunologic: Negative.    Neurological: Negative.  Negative for headaches.   Hematological: Negative.    Psychiatric/Behavioral: Negative.  Negative for confusion.   All other systems reviewed and are negative.      Past Medical History:   Diagnosis Date   • Arm fracture     left arm    • Arthritis     knee and hands   • Esophageal cancer (CMS/HCC)     Oct 2018   • Esophageal cancer (CMS/HCC)    • GERD  (gastroesophageal reflux disease)    • History of migraine headaches    • Hyperlipidemia        No Known Allergies    Past Surgical History:   Procedure Laterality Date   • COLONOSCOPY     • ENDOSCOPY N/A 9/20/2018    Procedure: ESOPHAGOGASTRODUODENOSCOPY WITH BIOPSY CPT CODE: 88890;  Surgeon: Baron Mccall MD;  Location: [facility];  Service: Gastroenterology   • ENDOSCOPY N/A 10/17/2018    Procedure: ESOPHAGOGASTRODUODENOSCOPY WITH BIOPSY CPT CODE: 09534;  Surgeon: Baron Mccall MD;  Location: [facility] OR;  Service: Gastroenterology   • ENDOSCOPY      with ablation   • ESOPHAGECTOMY     • MOUTH SURGERY         Family History   Problem Relation Age of Onset   • Osteoporosis Mother    • No Known Problems Father        Social History     Socioeconomic History   • Marital status: Single     Spouse name: Not on file   • Number of children: Not on file   • Years of education: Not on file   • Highest education level: Not on file   Tobacco Use   • Smoking status: Current Every Day Smoker     Packs/day: 0.00     Years: 25.00     Pack years: 0.00     Types: Pipe   • Smokeless tobacco: Never Used   Substance and Sexual Activity   • Alcohol use: No   • Drug use: No   • Sexual activity: Defer           Objective   Physical Exam   Constitutional: He is oriented to person, place, and time. He appears well-developed and well-nourished. No distress.   HENT:   Head: Normocephalic and atraumatic.   Right Ear: External ear normal.   Left Ear: External ear normal.   Nose: Nose normal.   Mouth/Throat: Oropharynx is clear and moist. No oropharyngeal exudate.   Eyes: Conjunctivae and EOM are normal. Right eye exhibits no discharge. Left eye exhibits no discharge. No scleral icterus.   Neck: Normal range of motion. Neck supple. No thyromegaly present.   Cardiovascular: Normal rate, regular rhythm, normal heart sounds and intact distal pulses. Exam reveals no gallop and no friction rub.   No murmur heard.  Pulmonary/Chest:  Breath sounds normal. No stridor. No respiratory distress. He has no wheezes. He has no rales.   Shallow respirations, tachypneic   Abdominal: He exhibits no distension. There is tenderness.   Postoperative tenderness.  Surgical incisions clean dry and intact   Musculoskeletal: Normal range of motion. He exhibits no deformity.   Neurological: He is alert and oriented to person, place, and time. No sensory deficit. He exhibits normal muscle tone.   Skin: Skin is warm. Capillary refill takes less than 2 seconds. He is diaphoretic. No pallor.   Psychiatric: He has a normal mood and affect. His behavior is normal. Judgment and thought content normal.   Nursing note and vitals reviewed.      Procedures           ED Course      CT Abdomen Pelvis With Contrast   ED Interpretation   CT abdomen pelvis with contrast   Fax report from virtual radiologic   Impression:   1.  Small scattered collections of postsurgical air within the anterior peritoneal cavity in this patient with recent hernia surgery.  Small amount of low-density postsurgical fluid within the anterior upper peritoneal cavity.  Scattered collections of postsurgical air within the anterior/right anterolateral soft tissues as well.   2.  Small amount of postsurgical hemorrhagic fluid within the posterior pelvis and in the lower right paracolic gutter.   3.  Area he has of mild wall thickening may involve portions of the colon, such as with mild colitis.   Signed Harley Winkler MD      CT Chest With Contrast   ED Interpretation   CT chest with IV contrast   Fax report from virtual radiologic   Impression: The patient is undergone prior gastric pull-up operation.  Air, fluid, and retained secretions within the gastric pull-up.   2.  Trace right pleural effusion.   3.  Mild areas of atelectasis within each posterior lower lobe and within the medial right lower lobe adjacent to the gastric polyp.   4.  Collections of intraperitoneal free air within the upper abdomen  in this patient with history of recent hernia surgery.   Signed Harley Winkler MD        Labs Reviewed   BLOOD GAS, ARTERIAL - Abnormal; Notable for the following components:       Result Value    pO2, Arterial 66.8 (*)     HCO3, Arterial 26.6 (*)     Hemoglobin, Blood Gas 16.3 (*)     All other components within normal limits   COMPREHENSIVE METABOLIC PANEL - Abnormal; Notable for the following components:    Glucose 124 (*)     Chloride 97 (*)     All other components within normal limits    Narrative:     GFR Normal >60  Chronic Kidney Disease <60  Kidney Failure <15   CBC WITH AUTO DIFFERENTIAL - Abnormal; Notable for the following components:    WBC 12.40 (*)     RDW 16.6 (*)     Lymphocyte % 14.8 (*)     Neutrophils, Absolute 9.42 (*)     Monocytes, Absolute 1.05 (*)     All other components within normal limits   PROTIME-INR - Normal    Narrative:     Suggested INR therapeutic range for stable oral anticoagulant therapy:    Low Intensity therapy:   1.5-2.0  Moderate Intensity therapy:   2.0-3.0  High Intensity therapy:   2.5-4.0   APTT - Normal    Narrative:     PTT Heparin Therapeutic Range:  59 - 95 seconds   LIPASE - Normal   LACTIC ACID, PLASMA - Normal   BLOOD CULTURE   BLOOD CULTURE   URINALYSIS W/ MICROSCOPIC IF INDICATED (NO CULTURE)   CBC AND DIFFERENTIAL    Narrative:     The following orders were created for panel order CBC & Differential.  Procedure                               Abnormality         Status                     ---------                               -----------         ------                     CBC Auto Differential[[id number]]        Abnormal            Final result                 Please view results for these tests on the individual orders.        Medication List      START taking these medications    ondansetron ODT 4 MG disintegrating tablet  Commonly known as:  ZOFRAN-ODT  Take 1 tablet by mouth 4 (Four) Times a Day.     promethazine 25 MG suppository  Commonly known as:   PHENERGAN  Insert 1 suppository into the rectum Every 6 (Six) Hours As Needed for   Nausea or Vomiting.        CHANGE how you take these medications    * HYDROcodone-acetaminophen 5-325 MG per tablet  Commonly known as:  NORCO  Take 1 tablet by mouth Every 6 (Six) Hours As Needed for Moderate Pain .  What changed:  Another medication with the same name was added. Make sure   you understand how and when to take each.     * HYDROcodone-acetaminophen 7.5-325 MG per tablet  Commonly known as:  NORCO  Take 1 tablet by mouth Every 4 (Four) Hours As Needed for Moderate Pain .  What changed:  You were already taking a medication with the same name,   and this prescription was added. Make sure you understand how and when to   take each.         * This list has 2 medication(s) that are the same as other medications   prescribed for you. Read the directions carefully, and ask your doctor or   other care provider to review them with you.            CONTINUE taking these medications    albuterol sulfate  (90 Base) MCG/ACT inhaler  Commonly known as:  PROVENTIL HFA;VENTOLIN HFA;PROAIR HFA     amitriptyline 25 MG tablet  Commonly known as:  ELAVIL  Take 1 tablet by mouth Every Night.     benzonatate 100 MG capsule  Commonly known as:  TESSALON PERLES  Take 1 capsule by mouth 3 (Three) Times a Day As Needed for Cough.     Calcium-Magnesium 333-167 MG tablet     cetirizine 10 MG tablet  Commonly known as:  zyrTEC  Take 1 tablet by mouth Daily.     dexlansoprazole 60 MG capsule  Commonly known as:  DEXILANT  Take 1 capsule by mouth Daily.     ferrous gluconate 324 MG tablet  Commonly known as:  FERGON  Take 1 tablet by mouth 2 (Two) Times a Day With Meals.     latanoprost 0.005 % ophthalmic solution  Commonly known as:  XALATAN     LORazepam 1 MG tablet  Commonly known as:  ATIVAN  Take 1 tablet by mouth 2 (Two) Times a Day As Needed for Anxiety.     ondansetron 4 MG tablet  Commonly known as:  ZOFRAN      oxyCODONE-acetaminophen  MG per tablet  Commonly known as:  PERCOCET  Take 1 tablet by mouth Every 4 (Four) Hours As Needed for Moderate Pain.     raNITIdine 300 MG tablet  Commonly known as:  ZANTAC  Take 1 tablet by mouth 2 (Two) Times a Day.     rOPINIRole 1 MG tablet  Commonly known as:  REQUIP  Take 1 tablet by mouth 2 (Two) Times a Day. Take 1 hour before bedtime.     vitamin C 250 MG tablet  Commonly known as:  ASCORBIC ACID                    MDM  Number of Diagnoses or Management Options  Postoperative pain: new and requires workup     Amount and/or Complexity of Data Reviewed  Clinical lab tests: ordered and reviewed  Tests in the radiology section of CPT®: ordered and reviewed  Decide to obtain previous medical records or to obtain history from someone other than the patient: yes  Obtain history from someone other than the patient: yes  Independent visualization of images, tracings, or specimens: yes    Risk of Complications, Morbidity, and/or Mortality  Presenting problems: high  Diagnostic procedures: high  Management options: high          Final diagnoses:   Postoperative pain            Jhony Kim MD  07/13/19 0657

## 2019-07-13 NOTE — ED NOTES
Provider at bedside at this time updating pt and family on results and plan to discharge home, all questions and concerns addressed, understanding verbalized.     Georgette Yoon RN  07/13/19 [id number]

## 2019-07-18 ENCOUNTER — OFFICE VISIT (OUTPATIENT)
Dept: SURGERY | Facility: CLINIC | Age: 44
End: 2019-07-18

## 2019-07-18 VITALS — BODY MASS INDEX: 24.77 KG/M2 | WEIGHT: 193 LBS | HEIGHT: 74 IN

## 2019-07-18 DIAGNOSIS — K43.2 INCISIONAL HERNIA, WITHOUT OBSTRUCTION OR GANGRENE: Primary | ICD-10-CM

## 2019-07-18 DIAGNOSIS — Z87.19 STATUS POST LAPAROSCOPIC HERNIA REPAIR: ICD-10-CM

## 2019-07-18 DIAGNOSIS — Z98.890 STATUS POST LAPAROSCOPIC HERNIA REPAIR: ICD-10-CM

## 2019-07-18 LAB
BACTERIA SPEC AEROBE CULT: NORMAL
BACTERIA SPEC AEROBE CULT: NORMAL

## 2019-07-18 PROCEDURE — 99024 POSTOP FOLLOW-UP VISIT: CPT | Performed by: SURGERY

## 2019-07-18 NOTE — PROGRESS NOTES
Subjective   Devan Alarcon is a 43 y.o. male.     History of Present Illness He is sore but otherwise doing well.     The following portions of the patient's history were reviewed and updated as appropriate: current medications, past family history, past medical history, past social history, past surgical history and problem list.    Review of Systems    Objective   Physical Exam wounds ok, no sign of recurrence or infection.    Assessment/Plan   Devan was seen today for post-op follow-up.    Diagnoses and all orders for this visit:    Incisional hernia, without obstruction or gangrene    Status post laparoscopic hernia repair    return

## 2019-08-15 ENCOUNTER — OFFICE VISIT (OUTPATIENT)
Dept: SURGERY | Facility: CLINIC | Age: 44
End: 2019-08-15

## 2019-08-15 VITALS — HEIGHT: 74 IN | WEIGHT: 193 LBS | BODY MASS INDEX: 24.77 KG/M2

## 2019-08-15 DIAGNOSIS — Z98.890 STATUS POST LAPAROSCOPIC HERNIA REPAIR: Primary | ICD-10-CM

## 2019-08-15 DIAGNOSIS — Z87.19 STATUS POST LAPAROSCOPIC HERNIA REPAIR: Primary | ICD-10-CM

## 2019-08-15 PROCEDURE — 99024 POSTOP FOLLOW-UP VISIT: CPT | Performed by: SURGERY

## 2019-08-15 NOTE — PROGRESS NOTES
Subjective   Devan Alarcon is a 43 y.o. male.     History of Present Illness His pain has eased off considerably since his ventral hernia repair. He has no bulge or other symptoms.     The following portions of the patient's history were reviewed and updated as appropriate: current medications, past family history, past medical history, past social history, past surgical history and problem list.    Review of Systems    Objective   Physical Exam wound ok. No sign of recurrence    Assessment/Plan   Devan was seen today for post-op follow-up.    Diagnoses and all orders for this visit:    Status post laparoscopic hernia repair    return 3 weeks, gradually do some more activity. Recheck then and if doing ok go back to work.

## 2019-08-23 DIAGNOSIS — D50.9 IRON DEFICIENCY ANEMIA, UNSPECIFIED IRON DEFICIENCY ANEMIA TYPE: ICD-10-CM

## 2019-08-23 RX ORDER — DOXYCYCLINE HYCLATE 50 MG/1
CAPSULE, GELATIN COATED ORAL
Qty: 60 TABLET | Refills: 5 | OUTPATIENT
Start: 2019-08-23

## 2019-08-27 ENCOUNTER — OFFICE VISIT (OUTPATIENT)
Dept: GASTROENTEROLOGY | Facility: CLINIC | Age: 44
End: 2019-08-27

## 2019-08-27 VITALS
HEIGHT: 74 IN | WEIGHT: 192 LBS | HEART RATE: 69 BPM | DIASTOLIC BLOOD PRESSURE: 87 MMHG | BODY MASS INDEX: 24.64 KG/M2 | SYSTOLIC BLOOD PRESSURE: 133 MMHG

## 2019-08-27 DIAGNOSIS — R19.7 DIARRHEA, UNSPECIFIED TYPE: ICD-10-CM

## 2019-08-27 DIAGNOSIS — R11.0 NAUSEA: Primary | ICD-10-CM

## 2019-08-27 PROCEDURE — 99214 OFFICE O/P EST MOD 30 MIN: CPT | Performed by: PHYSICIAN ASSISTANT

## 2019-08-27 RX ORDER — MONTELUKAST SODIUM 4 MG/1
1 TABLET, CHEWABLE ORAL DAILY
Qty: 90 TABLET | Refills: 2 | Status: SHIPPED | OUTPATIENT
Start: 2019-08-27 | End: 2020-03-09 | Stop reason: SDUPTHER

## 2019-08-27 RX ORDER — ONDANSETRON 8 MG/1
8 TABLET, ORALLY DISINTEGRATING ORAL EVERY 6 HOURS PRN
Qty: 42 TABLET | Refills: 5 | Status: SHIPPED | OUTPATIENT
Start: 2019-08-27

## 2019-08-27 NOTE — PROGRESS NOTES
: 1975    Chief Complaint   Patient presents with   • Dumping Syndrome       Devan Alarcon is a 43 y.o. male who presents to the office today as a follow up appointment regarding Dumping Syndrome.    History of Present Illness:  He underwent incisional hernia surgery 2019 by Dr. Goodrich. He was seen in the ED the following day due to fever but this resolved. He is not taking pain medications regularly any longer. He is feeling some better and gaining strength back. He is out of work right now since his surgery but plans to go back within the next couple of weeks. He eats very small amounts throughout the day. If he eats quickly or too much, he has severe GI complaints. He states that approx 3 times per day, he has episodes of very severe nausea, dizziness, mouth watering and then diarrhea. He has been told he likely has dumping syndrome and was told prior to his esophagectomy that he would have dumping. He has read about an injection that is used for treatment, octreotide. He did notice a very mild improvement in diarrhea only when he started taking iron supplements. He is still taking iron twice daily but sometimes forgets to take it. He is worried about having severe symptoms at work which could be very dangerous for him. He would like refills of Zofran which does help some during his episodes.     Review of Systems   Constitutional: Positive for chills and fatigue. Negative for fever.   HENT: Negative for trouble swallowing.    Eyes: Negative.    Respiratory: Negative for cough, choking, chest tightness and shortness of breath.    Cardiovascular: Negative for chest pain.   Gastrointestinal: Positive for abdominal pain, diarrhea and nausea. Negative for abdominal distention, anal bleeding, blood in stool, constipation, rectal pain and vomiting.   Endocrine: Negative.    Genitourinary: Negative for difficulty urinating.   Musculoskeletal: Negative for back pain and neck pain.   Skin: Negative for  pallor and wound.   Allergic/Immunologic: Negative for environmental allergies and food allergies.   Neurological: Positive for dizziness. Negative for light-headedness and headaches.   Hematological: Does not bruise/bleed easily.   Psychiatric/Behavioral: Negative.        Past Medical History:   Diagnosis Date   • Arm fracture     left arm    • Arthritis     knee and hands   • Esophageal cancer (CMS/HCC)     Oct 2018   • Esophageal cancer (CMS/HCC)    • GERD (gastroesophageal reflux disease)    • History of migraine headaches    • Hyperlipidemia        Past Surgical History:   Procedure Laterality Date   • COLONOSCOPY     • ENDOSCOPY N/A 9/20/2018    Procedure: ESOPHAGOGASTRODUODENOSCOPY WITH BIOPSY CPT CODE: 76329;  Surgeon: Baron Mccall MD;  Location: [facility];  Service: Gastroenterology   • ENDOSCOPY N/A 10/17/2018    Procedure: ESOPHAGOGASTRODUODENOSCOPY WITH BIOPSY CPT CODE: 79409;  Surgeon: Baron Mccall MD;  Location: [facility];  Service: Gastroenterology   • ENDOSCOPY      with ablation   • ESOPHAGECTOMY     • MOUTH SURGERY     • VENTRAL/INCISIONAL HERNIA REPAIR N/A 7/12/2019    Procedure: VENTRAL/INCISIONAL HERNIA REPAIR LAPAROSCOPIC;  Surgeon: Jaswinder Goodrich MD;  Location: [facility];  Service: General       Family History   Problem Relation Age of Onset   • Osteoporosis Mother    • No Known Problems Father        Social History     Socioeconomic History   • Marital status: Single     Spouse name: Not on file   • Number of children: Not on file   • Years of education: Not on file   • Highest education level: Not on file   Tobacco Use   • Smoking status: Current Every Day Smoker     Packs/day: 0.00     Years: 25.00     Pack years: 0.00     Types: Pipe   • Smokeless tobacco: Never Used   Substance and Sexual Activity   • Alcohol use: No   • Drug use: No   • Sexual activity: Defer       Current Outpatient Medications:   •  albuterol sulfate  (90 Base) MCG/ACT inhaler, Inhale 2 puffs  Every 4 (Four) Hours As Needed for Wheezing., Disp: , Rfl:   •  amitriptyline (ELAVIL) 25 MG tablet, Take 1 tablet by mouth Every Night., Disp: 30 tablet, Rfl: 5  •  benzonatate (TESSALON PERLES) 100 MG capsule, Take 1 capsule by mouth 3 (Three) Times a Day As Needed for Cough., Disp: 30 capsule, Rfl: 0  •  Calcium-Magnesium 333-167 MG tablet, Take  by mouth Daily., Disp: , Rfl:   •  cetirizine (zyrTEC) 10 MG tablet, Take 1 tablet by mouth Daily., Disp: 30 tablet, Rfl: 1  •  dexlansoprazole (DEXILANT) 60 MG capsule, Take 1 capsule by mouth Daily., Disp: 30 capsule, Rfl: 5  •  ferrous gluconate (FERGON) 324 MG tablet, Take 1 tablet by mouth 2 (Two) Times a Day With Meals., Disp: 60 tablet, Rfl: 5  •  HYDROcodone-acetaminophen (NORCO) 5-325 MG per tablet, Take 1 tablet by mouth Every 6 (Six) Hours As Needed for Moderate Pain ., Disp: 8 tablet, Rfl: 0  •  HYDROcodone-acetaminophen (NORCO) 7.5-325 MG per tablet, Take 1 tablet by mouth Every 4 (Four) Hours As Needed for Moderate Pain ., Disp: 12 tablet, Rfl: 0  •  latanoprost (XALATAN) 0.005 % ophthalmic solution, INSTILL ONE DROP IN BOTH EYES AT BEDTIME AS DIRECTED, Disp: , Rfl: 5  •  LORazepam (ATIVAN) 1 MG tablet, Take 1 tablet by mouth 2 (Two) Times a Day As Needed for Anxiety., Disp: 24 tablet, Rfl:   •  ondansetron (ZOFRAN) 4 MG tablet, Take 4 mg by mouth As Needed for Nausea or Vomiting., Disp: , Rfl:   •  ondansetron ODT (ZOFRAN-ODT) 4 MG disintegrating tablet, Take 1 tablet by mouth 4 (Four) Times a Day., Disp: 15 tablet, Rfl: 0  •  promethazine (PHENERGAN) 25 MG suppository, Insert 1 suppository into the rectum Every 6 (Six) Hours As Needed for Nausea or Vomiting., Disp: 12 suppository, Rfl: 0  •  raNITIdine (ZANTAC) 300 MG tablet, Take 1 tablet by mouth 2 (Two) Times a Day., Disp: 60 tablet, Rfl: 5  •  rOPINIRole (REQUIP) 1 MG tablet, Take 1 tablet by mouth 2 (Two) Times a Day. Take 1 hour before bedtime., Disp: 60 tablet, Rfl: 3  •  vitamin C (ASCORBIC ACID)  "250 MG tablet, Take 250 mg by mouth Daily., Disp: , Rfl:     Allergies:   Patient has no known allergies.    Vitals:  /87 (BP Location: Left arm, Patient Position: Sitting, Cuff Size: Adult)   Pulse 69   Ht 188 cm (74\")   Wt 87.1 kg (192 lb)   BMI 24.65 kg/m²     Physical Exam   Constitutional: He is oriented to person, place, and time. He appears well-developed and well-nourished. No distress.   HENT:   Head: Normocephalic and atraumatic.   Nose: Nose normal.   Mouth/Throat: Oropharynx is clear and moist.   Eyes: Conjunctivae are normal. Right eye exhibits no discharge. Left eye exhibits no discharge. No scleral icterus.   Neck: Normal range of motion. No JVD present.   Cardiovascular: Normal rate, regular rhythm and normal heart sounds. Exam reveals no gallop and no friction rub.   No murmur heard.  Pulmonary/Chest: Effort normal and breath sounds normal. No respiratory distress. He has no wheezes. He has no rales. He exhibits no tenderness.   Abdominal: Soft. Bowel sounds are normal. He exhibits no mass. There is tenderness (generalized, mild).   Previous j-tube scar. Long elliptical incisional scar upper abdomen. Ventral hernia, possibly 2, located in epigastric region along scar margin. Reducible.   Musculoskeletal: Normal range of motion. He exhibits no edema or deformity.   Pain with ROM lying supine on exam table   Neurological: He is alert and oriented to person, place, and time. Coordination normal.   Skin: Skin is warm and dry. No rash noted. He is not diaphoretic. No erythema.   Psychiatric: He has a normal mood and affect. His behavior is normal. Judgment and thought content normal.   Vitals reviewed.    Results Review:  CBC 7/13/2019 normal, previous Iron improved but still low in March 2019.    Assessment:  1. Nausea    2. Diarrhea, unspecified type      Plan:  For nausea, he can continue taking Zofran only as needed.     Regarding diarrhea, I have asked him to have 2 week trial with " Colestipol 1-3 tablets per day PRN diarrhea. He is agreeable. Will call back with response. CT scan 7/13/19 showed colitis but this seems unlikely, he had no worsening diarrhea and no left sided abdominal pain, this was the day after his hernia surgery.     New Medications Ordered This Visit   Medications   • ondansetron ODT (ZOFRAN-ODT) 8 MG disintegrating tablet     Sig: Take 1 tablet by mouth Every 6 (Six) Hours As Needed for Nausea or Vomiting.     Dispense:  42 tablet     Refill:  5   • colestipol (COLESTID) 1 g tablet     Sig: Take 1 tablet by mouth Daily. Can take 1-3 tablets PRN diarrhea.     Dispense:  90 tablet     Refill:  2           Return if symptoms worsen or fail to improve.      Electronically signed 8/27/2019 12:03 PM  Karen May PA-C, [facility]

## 2019-08-29 ENCOUNTER — TELEPHONE (OUTPATIENT)
Dept: GASTROENTEROLOGY | Facility: CLINIC | Age: 44
End: 2019-08-29

## 2019-08-29 DIAGNOSIS — R15.2 FECAL URGENCY: ICD-10-CM

## 2019-08-29 DIAGNOSIS — R11.0 NAUSEA: Primary | ICD-10-CM

## 2019-08-29 DIAGNOSIS — R19.7 DIARRHEA, UNSPECIFIED TYPE: ICD-10-CM

## 2019-08-29 DIAGNOSIS — R10.9 ABDOMINAL CRAMPING: ICD-10-CM

## 2019-08-29 DIAGNOSIS — Z90.49 HISTORY OF ESOPHAGECTOMY: ICD-10-CM

## 2019-08-29 DIAGNOSIS — Z98.890 HISTORY OF ESOPHAGECTOMY: ICD-10-CM

## 2019-09-05 ENCOUNTER — OFFICE VISIT (OUTPATIENT)
Dept: SURGERY | Facility: CLINIC | Age: 44
End: 2019-09-05

## 2019-09-05 VITALS — WEIGHT: 192 LBS | HEIGHT: 74 IN | BODY MASS INDEX: 24.64 KG/M2

## 2019-09-05 DIAGNOSIS — Z98.890 STATUS POST LAPAROSCOPIC HERNIA REPAIR: Primary | ICD-10-CM

## 2019-09-05 DIAGNOSIS — Z87.19 STATUS POST LAPAROSCOPIC HERNIA REPAIR: Primary | ICD-10-CM

## 2019-09-05 PROCEDURE — 99024 POSTOP FOLLOW-UP VISIT: CPT | Performed by: SURGERY

## 2019-09-05 NOTE — PROGRESS NOTES
Subjective   Devan Alarcon is a 43 y.o. male.     History of Present Illness He is doing well post laparoscopic ventral hernia repair.     The following portions of the patient's history were reviewed and updated as appropriate: current medications, past family history, past medical history, past social history, past surgical history and problem list.    Review of Systems    Objective   Physical Exam wounds are healing well    Assessment/Plan   Diagnoses and all orders for this visit:    Status post laparoscopic hernia repair    return prn

## 2019-09-13 DIAGNOSIS — G25.81 RESTLESS LEG SYNDROME: ICD-10-CM

## 2019-09-13 RX ORDER — ROPINIROLE 1 MG/1
TABLET, FILM COATED ORAL
Qty: 60 TABLET | Refills: 3 | Status: SHIPPED | OUTPATIENT
Start: 2019-09-13 | End: 2020-03-09 | Stop reason: SDUPTHER

## 2019-09-13 RX ORDER — CETIRIZINE HYDROCHLORIDE 10 MG/1
10 TABLET ORAL DAILY
Qty: 30 TABLET | Refills: 1 | Status: SHIPPED | OUTPATIENT
Start: 2019-09-13 | End: 2019-12-23

## 2019-09-16 NOTE — TELEPHONE ENCOUNTER
Cannot reach patient by phone, I mailed a letter out asking him to please call office or contact the scheduling department to schedule an gastric emptying study.

## 2019-09-18 ENCOUNTER — HOSPITAL ENCOUNTER (OUTPATIENT)
Dept: NUCLEAR MEDICINE | Facility: HOSPITAL | Age: 44
Discharge: HOME | End: 2019-09-18

## 2019-09-18 DIAGNOSIS — R19.7 DIARRHEA, UNSPECIFIED TYPE: ICD-10-CM

## 2019-09-18 DIAGNOSIS — Z98.890 HISTORY OF ESOPHAGECTOMY: ICD-10-CM

## 2019-09-18 DIAGNOSIS — Z90.49 HISTORY OF ESOPHAGECTOMY: ICD-10-CM

## 2019-09-18 DIAGNOSIS — R15.2 FECAL URGENCY: ICD-10-CM

## 2019-09-18 DIAGNOSIS — R10.9 ABDOMINAL CRAMPING: ICD-10-CM

## 2019-09-18 DIAGNOSIS — R11.0 NAUSEA: ICD-10-CM

## 2019-09-18 PROCEDURE — 78264 GASTRIC EMPTYING IMG STUDY: CPT

## 2019-09-18 PROCEDURE — 0 TECHNETIUM SULFUR COLLOID: Performed by: PHYSICIAN ASSISTANT

## 2019-09-18 PROCEDURE — A9541 TC99M SULFUR COLLOID: HCPCS | Performed by: PHYSICIAN ASSISTANT

## 2019-09-18 PROCEDURE — 78264 GASTRIC EMPTYING IMG STUDY: CPT | Performed by: RADIOLOGY

## 2019-09-18 RX ADMIN — TECHNETIUM TC 99M SULFUR COLLOID 1 DOSE: KIT at 08:58

## 2019-09-23 ENCOUNTER — OFFICE VISIT (OUTPATIENT)
Dept: FAMILY MEDICINE CLINIC | Facility: CLINIC | Age: 44
End: 2019-09-23

## 2019-09-23 VITALS
SYSTOLIC BLOOD PRESSURE: 152 MMHG | WEIGHT: 192.7 LBS | OXYGEN SATURATION: 98 % | BODY MASS INDEX: 24.73 KG/M2 | DIASTOLIC BLOOD PRESSURE: 88 MMHG | HEIGHT: 74 IN | TEMPERATURE: 97.3 F | HEART RATE: 74 BPM

## 2019-09-23 DIAGNOSIS — J06.9 ACUTE URI: Primary | ICD-10-CM

## 2019-09-23 PROCEDURE — 99214 OFFICE O/P EST MOD 30 MIN: CPT | Performed by: NURSE PRACTITIONER

## 2019-09-23 RX ORDER — METHYLPREDNISOLONE 4 MG/1
TABLET ORAL
Qty: 21 TABLET | Refills: 0 | Status: SHIPPED | OUTPATIENT
Start: 2019-09-23 | End: 2020-03-09

## 2019-09-23 RX ORDER — SACCHAROMYCES BOULARDII 250 MG
250 CAPSULE ORAL 2 TIMES DAILY
Qty: 60 CAPSULE | Refills: 0 | Status: SHIPPED | OUTPATIENT
Start: 2019-09-23 | End: 2019-10-31 | Stop reason: SDUPTHER

## 2019-09-23 RX ORDER — AMOXICILLIN AND CLAVULANATE POTASSIUM 875; 125 MG/1; MG/1
1 TABLET, FILM COATED ORAL 2 TIMES DAILY
Qty: 20 TABLET | Refills: 0 | Status: SHIPPED | OUTPATIENT
Start: 2019-09-23 | End: 2019-10-03

## 2019-09-23 RX ORDER — BENZONATATE 100 MG/1
100 CAPSULE ORAL 3 TIMES DAILY PRN
Qty: 30 CAPSULE | Refills: 0 | Status: SHIPPED | OUTPATIENT
Start: 2019-09-23 | End: 2020-03-09

## 2019-09-23 RX ORDER — ALBUTEROL SULFATE 90 UG/1
2 AEROSOL, METERED RESPIRATORY (INHALATION) EVERY 4 HOURS PRN
Qty: 18 G | Refills: 2 | Status: SHIPPED | OUTPATIENT
Start: 2019-09-23

## 2019-09-23 NOTE — PROGRESS NOTES
"Subjective   Devan Alarcon is a 43 y.o. male.     Chief Complaint   Patient presents with   • Sinusitis       He presents with c/o sneezing with drainage that started a couple days ago. He c/o runny nose and congestion. He states his snot is yellow. He states he took tylenol cold at home. He c/o chills, denies fever. He states he is starting to get a sore throat. He states he feels bad in the morning time. He states he is coughing in the morning time.          The following portions of the patient's history were reviewed and updated as appropriate: allergies, current medications, past family history, past medical history, past social history, past surgical history and problem list.    Review of Systems   Constitutional: Negative for fever and unexpected weight change.   HENT: Positive for rhinorrhea, sinus pressure, sinus pain and sore throat. Negative for ear pain and trouble swallowing.    Eyes: Negative for visual disturbance.   Respiratory: Positive for cough and shortness of breath. Negative for wheezing.    Cardiovascular: Negative for chest pain and palpitations.   Gastrointestinal: Positive for abdominal pain and diarrhea. Negative for blood in stool, constipation, nausea and vomiting.   Endocrine: Negative for polydipsia, polyphagia and polyuria.   Genitourinary: Negative for dysuria.   Musculoskeletal: Positive for arthralgias. Negative for myalgias.   Skin: Negative for color change.   Allergic/Immunologic: Negative for environmental allergies.   Neurological: Positive for headaches. Negative for dizziness.   Hematological: Negative for adenopathy.   Psychiatric/Behavioral: Negative for sleep disturbance and suicidal ideas. The patient is nervous/anxious.        Objective     /88 (BP Location: Right arm, Patient Position: Sitting, Cuff Size: Adult)   Pulse 74   Temp 97.3 °F (36.3 °C) (Oral)   Ht 188 cm (74.02\")   Wt 87.4 kg (192 lb 11.2 oz)   SpO2 98%   BMI 24.73 kg/m²     Physical Exam "   Constitutional: He is oriented to person, place, and time. He appears well-developed and well-nourished. No distress.   HENT:   Head: Normocephalic and atraumatic.   Right Ear: Hearing, tympanic membrane, external ear and ear canal normal.   Left Ear: Hearing, tympanic membrane, external ear and ear canal normal.   Nose: Nose normal. Right sinus exhibits no maxillary sinus tenderness and no frontal sinus tenderness. Left sinus exhibits no maxillary sinus tenderness and no frontal sinus tenderness.   Mouth/Throat: Uvula is midline and mucous membranes are normal. Oropharyngeal exudate and posterior oropharyngeal erythema present. No posterior oropharyngeal edema.   Eyes: Conjunctivae and lids are normal. Pupils are equal, round, and reactive to light.   Neck: Trachea normal. No tracheal tenderness present. No tracheal deviation present.   Cardiovascular: Normal rate, regular rhythm, S1 normal, S2 normal, normal heart sounds and intact distal pulses. Exam reveals no gallop and no friction rub.   No murmur heard.  Pulmonary/Chest: Effort normal and breath sounds normal. No respiratory distress.   Abdominal: Soft. Bowel sounds are normal. He exhibits no distension. There is no tenderness.   Neurological: He is alert and oriented to person, place, and time.   Skin: Skin is warm and dry. He is not diaphoretic.   Psychiatric: He has a normal mood and affect. His behavior is normal. Judgment and thought content normal.   Vitals reviewed.      Assessment/Plan     Problem List Items Addressed This Visit     None      Visit Diagnoses     Acute URI    -  Primary    Relevant Medications    albuterol sulfate  (90 Base) MCG/ACT inhaler    methylPREDNISolone (MEDROL, VITA,) 4 MG tablet    amoxicillin-clavulanate (AUGMENTIN) 875-125 MG per tablet    saccharomyces boulardii (FLORASTOR) 250 MG capsule    benzonatate (TESSALON PERLES) 100 MG capsule        Plan: Medrol ordered for acute uri. Tessalon ordered for cough. This can  numb your throat, be careful not to choke. Augmentin ordered only to be taken if your symptoms do not resolve with medrol dose pack. Augmentin may make your dumping worse. Florastor ordered with augmentin. Your blood pressure is high today. Please keep an eye on your blood pressure at home. Follow up if your symptoms do not improve.       Patient's Body mass index is 24.73 kg/m². BMI is within normal parameters. No follow-up required..   (Normal BMI:  18.5-24.9, OW 25-29.9, Obesity 30 or greater)    Devan Alarcon is a current other nicotine products user.  He currently smokes 2 pack of cigarettes and pipe bowlful per day for a duration of 3 years. I have educated him on the risk of diseases from using tobacco products such as cancer, COPD and heart diease.     I advised him to quit and he is not willing to quit.    I spent 1 minutes counseling the patient.          Understands disease processes and need for medications.  Understands reasons for urgent and emergent care.  Patient (& family) verbalized agreement for treatment plan.   Emotional support and active listening provided.  Patient provided time to verbalize feelings.               This document has been electronically signed by TONO Butts   September 23, 2019 2:10 PM

## 2019-10-31 DIAGNOSIS — D50.9 IRON DEFICIENCY ANEMIA, UNSPECIFIED IRON DEFICIENCY ANEMIA TYPE: ICD-10-CM

## 2019-10-31 DIAGNOSIS — K21.9 GASTROESOPHAGEAL REFLUX DISEASE, ESOPHAGITIS PRESENCE NOT SPECIFIED: ICD-10-CM

## 2019-10-31 DIAGNOSIS — J06.9 ACUTE URI: ICD-10-CM

## 2019-10-31 RX ORDER — SELENIUM 50 MCG
TABLET ORAL
Qty: 60 EACH | Refills: 3 | Status: SHIPPED | OUTPATIENT
Start: 2019-10-31 | End: 2022-05-02

## 2019-11-01 RX ORDER — RANITIDINE 300 MG/1
300 TABLET ORAL 2 TIMES DAILY
Qty: 60 TABLET | Refills: 5 | Status: SHIPPED | OUTPATIENT
Start: 2019-11-01 | End: 2021-03-12

## 2019-11-01 RX ORDER — DOXYCYCLINE HYCLATE 50 MG/1
324 CAPSULE, GELATIN COATED ORAL 2 TIMES DAILY WITH MEALS
Qty: 60 TABLET | Refills: 5 | Status: SHIPPED | OUTPATIENT
Start: 2019-11-01 | End: 2022-05-02

## 2019-11-01 RX ORDER — DOXYCYCLINE HYCLATE 50 MG/1
CAPSULE, GELATIN COATED ORAL
Qty: 60 TABLET | Refills: 0 | OUTPATIENT
Start: 2019-11-01

## 2019-11-01 RX ORDER — RANITIDINE 300 MG/1
TABLET ORAL
Qty: 60 TABLET | Refills: 5 | OUTPATIENT
Start: 2019-11-01

## 2019-12-23 ENCOUNTER — TELEPHONE (OUTPATIENT)
Dept: FAMILY MEDICINE CLINIC | Facility: CLINIC | Age: 44
End: 2019-12-23

## 2019-12-23 DIAGNOSIS — Z20.828 EXPOSURE TO THE FLU: Primary | ICD-10-CM

## 2019-12-23 RX ORDER — OSELTAMIVIR PHOSPHATE 75 MG/1
75 CAPSULE ORAL 2 TIMES DAILY
Qty: 10 CAPSULE | Refills: 0 | Status: SHIPPED | OUTPATIENT
Start: 2019-12-23 | End: 2020-03-09

## 2019-12-23 RX ORDER — CETIRIZINE HYDROCHLORIDE 10 MG/1
TABLET ORAL
Qty: 30 TABLET | Refills: 1 | Status: SHIPPED | OUTPATIENT
Start: 2019-12-23 | End: 2020-03-09 | Stop reason: SDUPTHER

## 2019-12-23 NOTE — TELEPHONE ENCOUNTER
WIFE CALLED WOULD LIKE [facility]IA[facility] CALLED IN FOR PATIENT.  HIS FATHER IN LAW DX WITH FLU THIS MORNING.

## 2020-03-04 RX ORDER — AMITRIPTYLINE HYDROCHLORIDE 25 MG/1
TABLET, FILM COATED ORAL
Qty: 30 TABLET | Refills: 0 | Status: SHIPPED | OUTPATIENT
Start: 2020-03-04 | End: 2020-03-09 | Stop reason: SDUPTHER

## 2020-03-09 ENCOUNTER — OFFICE VISIT (OUTPATIENT)
Dept: FAMILY MEDICINE CLINIC | Facility: CLINIC | Age: 45
End: 2020-03-09

## 2020-03-09 VITALS
HEIGHT: 74 IN | WEIGHT: 196.2 LBS | BODY MASS INDEX: 25.18 KG/M2 | OXYGEN SATURATION: 98 % | DIASTOLIC BLOOD PRESSURE: 78 MMHG | HEART RATE: 86 BPM | SYSTOLIC BLOOD PRESSURE: 114 MMHG | TEMPERATURE: 97.4 F

## 2020-03-09 DIAGNOSIS — G89.29 CHRONIC INTRACTABLE HEADACHE, UNSPECIFIED HEADACHE TYPE: ICD-10-CM

## 2020-03-09 DIAGNOSIS — J06.9 ACUTE URI: Primary | ICD-10-CM

## 2020-03-09 DIAGNOSIS — R51.9 CHRONIC INTRACTABLE HEADACHE, UNSPECIFIED HEADACHE TYPE: ICD-10-CM

## 2020-03-09 DIAGNOSIS — G25.81 RESTLESS LEG SYNDROME: ICD-10-CM

## 2020-03-09 DIAGNOSIS — J30.9 ALLERGIC RHINITIS, UNSPECIFIED SEASONALITY, UNSPECIFIED TRIGGER: ICD-10-CM

## 2020-03-09 DIAGNOSIS — J01.10 ACUTE NON-RECURRENT FRONTAL SINUSITIS: ICD-10-CM

## 2020-03-09 DIAGNOSIS — R19.7 DIARRHEA, UNSPECIFIED TYPE: ICD-10-CM

## 2020-03-09 PROCEDURE — 99213 OFFICE O/P EST LOW 20 MIN: CPT | Performed by: NURSE PRACTITIONER

## 2020-03-09 RX ORDER — MONTELUKAST SODIUM 4 MG/1
1 TABLET, CHEWABLE ORAL DAILY
Qty: 90 TABLET | Refills: 2 | Status: SHIPPED | OUTPATIENT
Start: 2020-03-09 | End: 2021-03-12 | Stop reason: SDUPTHER

## 2020-03-09 RX ORDER — ROPINIROLE 1 MG/1
1 TABLET, FILM COATED ORAL NIGHTLY
Qty: 60 TABLET | Refills: 3 | Status: SHIPPED | OUTPATIENT
Start: 2020-03-09 | End: 2021-02-10

## 2020-03-09 RX ORDER — AMOXICILLIN 875 MG/1
875 TABLET, COATED ORAL 2 TIMES DAILY
Qty: 20 TABLET | Refills: 0 | Status: SHIPPED | OUTPATIENT
Start: 2020-03-09 | End: 2020-03-19

## 2020-03-09 RX ORDER — AMITRIPTYLINE HYDROCHLORIDE 25 MG/1
25 TABLET, FILM COATED ORAL NIGHTLY
Qty: 30 TABLET | Refills: 5 | Status: SHIPPED | OUTPATIENT
Start: 2020-03-09 | End: 2020-10-29

## 2020-03-09 RX ORDER — CETIRIZINE HYDROCHLORIDE 10 MG/1
10 TABLET ORAL DAILY
Qty: 30 TABLET | Refills: 5 | Status: SHIPPED | OUTPATIENT
Start: 2020-03-09 | End: 2020-10-29

## 2020-03-09 NOTE — PROGRESS NOTES
"Subjective   Devan Alarcon is a 44 y.o. male.     Chief Complaint   Patient presents with   • Sinusitis       He presents with c/o sinus pain and pressure with yellow sinus drainage for the past 3-4 weeks. He denies fever and chills. He does have ill contacts. He states he has taken zyrtec and claritin d.        The following portions of the patient's history were reviewed and updated as appropriate: allergies, current medications, past family history, past medical history, past social history, past surgical history and problem list.    Review of Systems   Constitutional: Negative for fever and unexpected weight change.   HENT: Positive for rhinorrhea, sinus pressure and sinus pain. Negative for ear pain, sore throat and trouble swallowing.    Eyes: Negative for visual disturbance.   Respiratory: Negative for cough, shortness of breath and wheezing.    Cardiovascular: Negative for chest pain and palpitations.   Gastrointestinal: Negative for abdominal pain, blood in stool, constipation, diarrhea, nausea and vomiting.   Genitourinary: Negative for dysuria and hematuria.   Musculoskeletal: Negative for arthralgias and myalgias.   Skin: Negative for color change.   Allergic/Immunologic: Negative for environmental allergies.   Neurological: Positive for headaches. Negative for dizziness.   Hematological: Negative for adenopathy.   Psychiatric/Behavioral: Negative for sleep disturbance and suicidal ideas. The patient is not nervous/anxious.        Objective     /78 (BP Location: Right arm, Patient Position: Sitting, Cuff Size: Adult)   Pulse 86   Temp 97.4 °F (36.3 °C) (Oral)   Ht 188 cm (74.02\")   Wt 89 kg (196 lb 3.2 oz)   SpO2 98%   BMI 25.18 kg/m²     Physical Exam   Constitutional: He is oriented to person, place, and time. He appears well-developed and well-nourished. No distress.   HENT:   Head: Normocephalic and atraumatic.   Right Ear: Hearing, tympanic membrane, external ear and ear canal normal.   "   Left Ear: Hearing, tympanic membrane, external ear and ear canal normal.   Nose: Right sinus exhibits frontal sinus tenderness. Right sinus exhibits no maxillary sinus tenderness. Left sinus exhibits frontal sinus tenderness. Left sinus exhibits no maxillary sinus tenderness.   Mouth/Throat: Uvula is midline, oropharynx is clear and moist and mucous membranes are normal.   Eyes: Pupils are equal, round, and reactive to light. Conjunctivae, EOM and lids are normal.   Neck: Trachea normal. No tracheal tenderness present. No tracheal deviation present.   Cardiovascular: Normal rate, regular rhythm, S1 normal, S2 normal, normal heart sounds and intact distal pulses. Exam reveals no gallop and no friction rub.   No murmur heard.  Pulmonary/Chest: Effort normal and breath sounds normal. No respiratory distress.   Abdominal: Soft. Bowel sounds are normal. He exhibits no distension. There is no tenderness.   Neurological: He is alert and oriented to person, place, and time.   Skin: Skin is warm and dry. He is not diaphoretic.   Psychiatric: He has a normal mood and affect. His behavior is normal. Judgment and thought content normal.   Vitals reviewed.      Assessment/Plan     Problem List Items Addressed This Visit        Nervous and Auditory    Chronic headache    Relevant Medications    amitriptyline (ELAVIL) 25 MG tablet       Other    Restless leg syndrome    Relevant Medications    rOPINIRole (REQUIP) 1 MG tablet      Other Visit Diagnoses     Acute URI    -  Primary    Relevant Medications    amoxicillin (AMOXIL) 875 MG tablet    Diarrhea, unspecified type        Relevant Medications    colestipol (COLESTID) 1 g tablet    Allergic rhinitis, unspecified seasonality, unspecified trigger        Relevant Medications    cetirizine (zyrTEC) 10 MG tablet    Acute non-recurrent frontal sinusitis        Relevant Medications    amoxicillin (AMOXIL) 875 MG tablet        Plan: Amoxicillin ordered for acute frontal sinusitis.  Rest, drink lots of fluids. He states he had labs with Dr. Claudio from oncology recently. Try to get those lab reports.     Patient's Body mass index is 25.18 kg/m². BMI is within normal parameters. No follow-up required..   (Normal BMI:  18.5-24.9, OW 25-29.9, Obesity 30 or greater)    Devan Alarcon  reports that he has been smoking pipe. He has been smoking about 0.00 packs per day for the past 25.00 years. He has never used smokeless tobacco.. I have educated him on the risk of diseases from using tobacco products such as cancer, COPD and heart diease.     I advised him to quit and he is not willing to quit.    I spent 1 minutes counseling the patient.           Understands disease processes and need for medications.  Understands reasons for urgent and emergent care.  Patient (& family) verbalized agreement for treatment plan.   Emotional support and active listening provided.  Patient provided time to verbalize feelings.               This document has been electronically signed by TONO Butts   March 9, 2020 2:33 PM

## 2020-04-13 ENCOUNTER — TELEPHONE (OUTPATIENT)
Dept: FAMILY MEDICINE CLINIC | Facility: CLINIC | Age: 45
End: 2020-04-13

## 2020-10-29 DIAGNOSIS — R51.9 CHRONIC INTRACTABLE HEADACHE, UNSPECIFIED HEADACHE TYPE: ICD-10-CM

## 2020-10-29 DIAGNOSIS — J30.9 ALLERGIC RHINITIS, UNSPECIFIED SEASONALITY, UNSPECIFIED TRIGGER: ICD-10-CM

## 2020-10-29 DIAGNOSIS — G89.29 CHRONIC INTRACTABLE HEADACHE, UNSPECIFIED HEADACHE TYPE: ICD-10-CM

## 2020-10-29 RX ORDER — CETIRIZINE HYDROCHLORIDE 10 MG/1
TABLET ORAL
Qty: 30 TABLET | Refills: 5 | Status: SHIPPED | OUTPATIENT
Start: 2020-10-29 | End: 2021-03-12 | Stop reason: SDUPTHER

## 2020-10-29 RX ORDER — AMITRIPTYLINE HYDROCHLORIDE 25 MG/1
TABLET, FILM COATED ORAL
Qty: 30 TABLET | Refills: 5 | Status: SHIPPED | OUTPATIENT
Start: 2020-10-29 | End: 2021-03-12 | Stop reason: SDUPTHER

## 2020-12-14 ENCOUNTER — TELEPHONE (OUTPATIENT)
Dept: FAMILY MEDICINE CLINIC | Facility: CLINIC | Age: 45
End: 2020-12-14

## 2020-12-14 NOTE — TELEPHONE ENCOUNTER
Patient's fiance' is calling (  verbal on file) to see when the first time was the patient was prescribed raNITIdine (ZANTAC) 300 MG tablet.  Please advise    Autumn Vázquez call back [phone]

## 2021-02-10 DIAGNOSIS — G25.81 RESTLESS LEG SYNDROME: ICD-10-CM

## 2021-02-10 RX ORDER — ROPINIROLE 1 MG/1
TABLET, FILM COATED ORAL
Qty: 30 TABLET | Refills: 0 | Status: SHIPPED | OUTPATIENT
Start: 2021-02-10 | End: 2021-03-12 | Stop reason: SDUPTHER

## 2021-03-12 ENCOUNTER — TELEMEDICINE (OUTPATIENT)
Dept: FAMILY MEDICINE CLINIC | Facility: CLINIC | Age: 46
End: 2021-03-12

## 2021-03-12 DIAGNOSIS — Z13.220 SCREENING, LIPID: ICD-10-CM

## 2021-03-12 DIAGNOSIS — J30.9 ALLERGIC RHINITIS, UNSPECIFIED SEASONALITY, UNSPECIFIED TRIGGER: ICD-10-CM

## 2021-03-12 DIAGNOSIS — G89.29 CHRONIC INTRACTABLE HEADACHE, UNSPECIFIED HEADACHE TYPE: Primary | Chronic | ICD-10-CM

## 2021-03-12 DIAGNOSIS — R51.9 CHRONIC INTRACTABLE HEADACHE, UNSPECIFIED HEADACHE TYPE: Primary | Chronic | ICD-10-CM

## 2021-03-12 DIAGNOSIS — K21.9 GASTROESOPHAGEAL REFLUX DISEASE, UNSPECIFIED WHETHER ESOPHAGITIS PRESENT: Chronic | ICD-10-CM

## 2021-03-12 DIAGNOSIS — G25.81 RESTLESS LEG SYNDROME: Chronic | ICD-10-CM

## 2021-03-12 DIAGNOSIS — R19.7 DIARRHEA, UNSPECIFIED TYPE: Chronic | ICD-10-CM

## 2021-03-12 DIAGNOSIS — Z13.29 THYROID DISORDER SCREEN: ICD-10-CM

## 2021-03-12 PROCEDURE — 99214 OFFICE O/P EST MOD 30 MIN: CPT | Performed by: NURSE PRACTITIONER

## 2021-03-12 RX ORDER — DEXLANSOPRAZOLE 60 MG/1
60 CAPSULE, DELAYED RELEASE ORAL DAILY
Qty: 30 CAPSULE | Refills: 5 | Status: SHIPPED | OUTPATIENT
Start: 2021-03-12

## 2021-03-12 RX ORDER — AMITRIPTYLINE HYDROCHLORIDE 25 MG/1
25 TABLET, FILM COATED ORAL 2 TIMES DAILY
Qty: 30 TABLET | Refills: 5 | Status: SHIPPED | OUTPATIENT
Start: 2021-03-12 | End: 2021-10-20

## 2021-03-12 RX ORDER — CETIRIZINE HYDROCHLORIDE 10 MG/1
10 TABLET ORAL DAILY
Qty: 30 TABLET | Refills: 5 | Status: SHIPPED | OUTPATIENT
Start: 2021-03-12 | End: 2022-07-06

## 2021-03-12 RX ORDER — MONTELUKAST SODIUM 4 MG/1
1 TABLET, CHEWABLE ORAL DAILY
Qty: 90 TABLET | Refills: 5 | Status: SHIPPED | OUTPATIENT
Start: 2021-03-12 | End: 2021-10-26

## 2021-03-12 RX ORDER — ROPINIROLE 1 MG/1
1 TABLET, FILM COATED ORAL 2 TIMES DAILY
Qty: 60 TABLET | Refills: 5 | Status: SHIPPED | OUTPATIENT
Start: 2021-03-12 | End: 2021-10-20

## 2021-03-12 NOTE — PROGRESS NOTES
Subjective   Devan Alarcon is a 45 y.o. male.     Chief Complaint   Patient presents with   • Follow-up       He presents via video visit for follow up of migraines, seasonal allergies, acid reflux, and restless leg syndrome. He has had one migraine since he started the amitriptyline.        The following portions of the patient's history were reviewed and updated as appropriate: allergies, current medications, past family history, past medical history, past social history, past surgical history and problem list.    Review of Systems   Constitutional: Negative for fever and unexpected weight change.   HENT: Negative for ear pain, rhinorrhea, sore throat and trouble swallowing.    Eyes: Negative for visual disturbance.   Respiratory: Negative for cough, shortness of breath and wheezing.    Cardiovascular: Negative for chest pain and palpitations.   Gastrointestinal: Negative for abdominal pain, blood in stool, constipation, diarrhea, nausea and vomiting.   Genitourinary: Negative for dysuria and hematuria.   Musculoskeletal: Negative for arthralgias and myalgias.   Skin: Negative for color change.   Allergic/Immunologic: Negative for environmental allergies.   Neurological: Negative for dizziness.   Hematological: Negative for adenopathy.   Psychiatric/Behavioral: Negative for sleep disturbance and suicidal ideas. The patient is not nervous/anxious.        Objective     There were no vitals taken for this visit.    Physical Exam  Constitutional:       Appearance: Normal appearance.   Neurological:      Mental Status: He is alert and oriented to person, place, and time.   Psychiatric:         Mood and Affect: Mood normal.         Behavior: Behavior normal.         Thought Content: Thought content normal.         Judgment: Judgment normal.         Assessment/Plan     Problem List Items Addressed This Visit        Gastrointestinal Abdominal     Gastroesophageal reflux disease    Overview     Added automatically from  request for surgery [id number]         Relevant Medications    dexlansoprazole (Dexilant) 60 MG capsule    Other Relevant Orders    CBC & Differential    Comprehensive Metabolic Panel    Lipid Panel       Neuro    Chronic headache - Primary    Relevant Medications    amitriptyline (ELAVIL) 25 MG tablet    Restless leg syndrome    Relevant Medications    rOPINIRole (REQUIP) 1 MG tablet      Other Visit Diagnoses     Allergic rhinitis, unspecified seasonality, unspecified trigger        Relevant Medications    cetirizine (zyrTEC) 10 MG tablet    Diarrhea, unspecified type  (Chronic)       Relevant Medications    colestipol (COLESTID) 1 g tablet    Other Relevant Orders    CBC & Differential    Comprehensive Metabolic Panel    Lipid Panel    Screening, lipid        Relevant Orders    Lipid Panel    Thyroid disorder screen        Relevant Orders    TSH          Plan: Continue current meds. Get labs. Follow up in 6 months and as needed.      There is no height or weight on file to calculate BMI.     You have chosen to receive care through a telehealth visit.  Do you consent to use a video/audio connection for your medical care today? Yes         Understands disease processes and need for medications.  Understands reasons for urgent and emergent care.  Patient (& family) verbalized agreement for treatment plan.   Emotional support and active listening provided.  Patient provided time to verbalize feelings.               This document has been electronically signed by TONO Butts   March 12, 2021 15:26 EST

## 2021-03-16 ENCOUNTER — BULK ORDERING (OUTPATIENT)
Dept: CASE MANAGEMENT | Facility: OTHER | Age: 46
End: 2021-03-16

## 2021-03-16 DIAGNOSIS — Z23 IMMUNIZATION DUE: ICD-10-CM

## 2021-03-30 ENCOUNTER — LAB (OUTPATIENT)
Dept: FAMILY MEDICINE CLINIC | Facility: CLINIC | Age: 46
End: 2021-03-30

## 2021-03-30 DIAGNOSIS — Z13.220 SCREENING, LIPID: ICD-10-CM

## 2021-03-30 DIAGNOSIS — K21.9 GASTROESOPHAGEAL REFLUX DISEASE, UNSPECIFIED WHETHER ESOPHAGITIS PRESENT: ICD-10-CM

## 2021-03-30 DIAGNOSIS — R19.7 DIARRHEA, UNSPECIFIED TYPE: ICD-10-CM

## 2021-03-30 DIAGNOSIS — Z13.29 THYROID DISORDER SCREEN: ICD-10-CM

## 2021-03-30 PROCEDURE — 80053 COMPREHEN METABOLIC PANEL: CPT | Performed by: NURSE PRACTITIONER

## 2021-03-30 PROCEDURE — 84443 ASSAY THYROID STIM HORMONE: CPT | Performed by: NURSE PRACTITIONER

## 2021-03-30 PROCEDURE — 80061 LIPID PANEL: CPT | Performed by: NURSE PRACTITIONER

## 2021-03-30 PROCEDURE — 85025 COMPLETE CBC W/AUTO DIFF WBC: CPT | Performed by: NURSE PRACTITIONER

## 2021-03-30 PROCEDURE — 36415 COLL VENOUS BLD VENIPUNCTURE: CPT | Performed by: FAMILY MEDICINE

## 2021-03-31 LAB
ALBUMIN SERPL-MCNC: 4.2 G/DL (ref 3.5–5.2)
ALBUMIN/GLOB SERPL: 1.4 G/DL
ALP SERPL-CCNC: 95 U/L (ref 39–117)
ALT SERPL W P-5'-P-CCNC: 35 U/L (ref 1–41)
ANION GAP SERPL CALCULATED.3IONS-SCNC: 10.1 MMOL/L (ref 5–15)
AST SERPL-CCNC: 28 U/L (ref 1–40)
BASOPHILS # BLD AUTO: 0.05 10*3/MM3 (ref 0–0.2)
BASOPHILS NFR BLD AUTO: 0.6 % (ref 0–1.5)
BILIRUB SERPL-MCNC: 0.2 MG/DL (ref 0–1.2)
BUN SERPL-MCNC: 8 MG/DL (ref 6–20)
BUN/CREAT SERPL: 7.3 (ref 7–25)
CALCIUM SPEC-SCNC: 9 MG/DL (ref 8.6–10.5)
CHLORIDE SERPL-SCNC: 101 MMOL/L (ref 98–107)
CHOLEST SERPL-MCNC: 197 MG/DL (ref 0–200)
CO2 SERPL-SCNC: 27.9 MMOL/L (ref 22–29)
CREAT SERPL-MCNC: 1.1 MG/DL (ref 0.76–1.27)
DEPRECATED RDW RBC AUTO: 44.9 FL (ref 37–54)
EOSINOPHIL # BLD AUTO: 0.16 10*3/MM3 (ref 0–0.4)
EOSINOPHIL NFR BLD AUTO: 2.1 % (ref 0.3–6.2)
ERYTHROCYTE [DISTWIDTH] IN BLOOD BY AUTOMATED COUNT: 13.5 % (ref 12.3–15.4)
GFR SERPL CREATININE-BSD FRML MDRD: 72 ML/MIN/1.73
GLOBULIN UR ELPH-MCNC: 3.1 GM/DL
GLUCOSE SERPL-MCNC: 93 MG/DL (ref 65–99)
HCT VFR BLD AUTO: 49.3 % (ref 37.5–51)
HDLC SERPL-MCNC: 30 MG/DL (ref 40–60)
HGB BLD-MCNC: 16 G/DL (ref 13–17.7)
IMM GRANULOCYTES # BLD AUTO: 0.02 10*3/MM3 (ref 0–0.05)
IMM GRANULOCYTES NFR BLD AUTO: 0.3 % (ref 0–0.5)
LDLC SERPL CALC-MCNC: 124 MG/DL (ref 0–100)
LDLC/HDLC SERPL: 3.97 {RATIO}
LYMPHOCYTES # BLD AUTO: 3.14 10*3/MM3 (ref 0.7–3.1)
LYMPHOCYTES NFR BLD AUTO: 40.6 % (ref 19.6–45.3)
MCH RBC QN AUTO: 29.1 PG (ref 26.6–33)
MCHC RBC AUTO-ENTMCNC: 32.5 G/DL (ref 31.5–35.7)
MCV RBC AUTO: 89.8 FL (ref 79–97)
MONOCYTES # BLD AUTO: 0.65 10*3/MM3 (ref 0.1–0.9)
MONOCYTES NFR BLD AUTO: 8.4 % (ref 5–12)
NEUTROPHILS NFR BLD AUTO: 3.71 10*3/MM3 (ref 1.7–7)
NEUTROPHILS NFR BLD AUTO: 48 % (ref 42.7–76)
NRBC BLD AUTO-RTO: 0 /100 WBC (ref 0–0.2)
PLATELET # BLD AUTO: 296 10*3/MM3 (ref 140–450)
PMV BLD AUTO: 11.4 FL (ref 6–12)
POTASSIUM SERPL-SCNC: 5.2 MMOL/L (ref 3.5–5.2)
PROT SERPL-MCNC: 7.3 G/DL (ref 6–8.5)
RBC # BLD AUTO: 5.49 10*6/MM3 (ref 4.14–5.8)
SODIUM SERPL-SCNC: 139 MMOL/L (ref 136–145)
TRIGL SERPL-MCNC: 239 MG/DL (ref 0–150)
TSH SERPL DL<=0.05 MIU/L-ACNC: 4.94 UIU/ML (ref 0.27–4.2)
VLDLC SERPL-MCNC: 43 MG/DL (ref 5–40)
WBC # BLD AUTO: 7.73 10*3/MM3 (ref 3.4–10.8)

## 2021-04-12 ENCOUNTER — TELEMEDICINE (OUTPATIENT)
Dept: FAMILY MEDICINE CLINIC | Facility: CLINIC | Age: 46
End: 2021-04-12

## 2021-04-12 DIAGNOSIS — K57.92 ACUTE DIVERTICULITIS: Primary | ICD-10-CM

## 2021-04-12 PROCEDURE — 99213 OFFICE O/P EST LOW 20 MIN: CPT | Performed by: NURSE PRACTITIONER

## 2021-04-12 RX ORDER — METRONIDAZOLE 250 MG/1
250 TABLET ORAL 3 TIMES DAILY
Qty: 30 TABLET | Refills: 0 | Status: SHIPPED | OUTPATIENT
Start: 2021-04-12 | End: 2021-09-14

## 2021-04-12 RX ORDER — CIPROFLOXACIN 500 MG/1
500 TABLET, FILM COATED ORAL 2 TIMES DAILY
Qty: 20 TABLET | Refills: 0 | Status: SHIPPED | OUTPATIENT
Start: 2021-04-12 | End: 2021-09-14

## 2021-04-12 NOTE — PROGRESS NOTES
Subjective   Devan Alarcon is a 45 y.o. male.     Chief Complaint   Patient presents with   • Abdominal Pain       He presents via telephone visit with c/o lower abdominal pain since last Tuesday/Wednesday. It started after he ate a salad. He has been trying to stay on a liquid diet and stay away from fiber but it is not getting better. He denies diarrhea. He c/o constipation. He denies blood in the stool. He has a history of diverticulitis and it feels like that. He denies fever but always has chills. He c/o some nausea.        The following portions of the patient's history were reviewed and updated as appropriate: allergies, current medications, past family history, past medical history, past social history, past surgical history and problem list.    Review of Systems   Constitutional: Negative for fever and unexpected weight change.   HENT: Negative for ear pain, rhinorrhea, sore throat and trouble swallowing.    Eyes: Negative for visual disturbance.   Respiratory: Negative for cough, shortness of breath and wheezing.    Cardiovascular: Negative for chest pain and palpitations.   Gastrointestinal: Positive for abdominal pain and constipation. Negative for abdominal distention, blood in stool, diarrhea, nausea and vomiting.   Genitourinary: Negative for dysuria.   Musculoskeletal: Negative for arthralgias and myalgias.   Skin: Negative for color change.   Allergic/Immunologic: Negative for environmental allergies.   Neurological: Positive for dizziness. Negative for headaches.   Hematological: Negative for adenopathy.   Psychiatric/Behavioral: Negative for sleep disturbance and suicidal ideas. The patient is not nervous/anxious.        Objective     There were no vitals taken for this visit.    Physical Exam  Constitutional:       Appearance: Normal appearance.   Pulmonary:      Effort: Pulmonary effort is normal.   Abdominal:      Tenderness: There is abdominal tenderness in the suprapubic area.   Neurological:       Mental Status: He is alert.         Assessment/Plan     Problem List Items Addressed This Visit     None      Visit Diagnoses     Acute diverticulitis    -  Primary    Relevant Medications    ciprofloxacin (Cipro) 500 MG tablet    metroNIDAZOLE (Flagyl) 250 MG tablet          Plan: Cipro and metronidazole ordered for diverticulitis. Follow up in one week if no improvement.      There is no height or weight on file to calculate BMI.              Understands disease processes and need for medications.  Understands reasons for urgent and emergent care.  Patient (& family) verbalized agreement for treatment plan.   Emotional support and active listening provided.  Patient provided time to verbalize feelings.         The use of a video visit has been reviewed with the patient and verbal informed consent has been obtained.        This document has been electronically signed by TONO Butts   April 12, 2021 13:06 EDT

## 2021-07-12 ENCOUNTER — HOSPITAL ENCOUNTER (EMERGENCY)
Facility: HOSPITAL | Age: 46
Discharge: HOME | End: 2021-07-13
Attending: EMERGENCY MEDICINE | Admitting: EMERGENCY MEDICINE

## 2021-07-12 ENCOUNTER — APPOINTMENT (OUTPATIENT)
Dept: CT IMAGING | Facility: HOSPITAL | Age: 46
End: 2021-07-12

## 2021-07-12 VITALS
HEART RATE: 94 BPM | SYSTOLIC BLOOD PRESSURE: 152 MMHG | BODY MASS INDEX: 29.52 KG/M2 | WEIGHT: 230 LBS | RESPIRATION RATE: 18 BRPM | TEMPERATURE: 98.7 F | OXYGEN SATURATION: 97 % | HEIGHT: 74 IN | DIASTOLIC BLOOD PRESSURE: 93 MMHG

## 2021-07-12 DIAGNOSIS — S16.1XXA STRAIN OF NECK MUSCLE, INITIAL ENCOUNTER: Primary | ICD-10-CM

## 2021-07-12 PROCEDURE — 72131 CT LUMBAR SPINE W/O DYE: CPT

## 2021-07-12 PROCEDURE — 99282 EMERGENCY DEPT VISIT SF MDM: CPT

## 2021-07-12 PROCEDURE — 72128 CT CHEST SPINE W/O DYE: CPT

## 2021-07-12 PROCEDURE — 70450 CT HEAD/BRAIN W/O DYE: CPT

## 2021-07-12 PROCEDURE — 72125 CT NECK SPINE W/O DYE: CPT
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
## 2021-09-14 ENCOUNTER — OFFICE VISIT (OUTPATIENT)
Dept: FAMILY MEDICINE CLINIC | Facility: CLINIC | Age: 46
End: 2021-09-14

## 2021-09-14 DIAGNOSIS — Z02.89 ENCOUNTER FOR PHYSICAL EXAMINATION RELATED TO EMPLOYMENT: ICD-10-CM

## 2021-09-14 PROCEDURE — 99173 VISUAL ACUITY SCREEN: CPT | Performed by: INTERNAL MEDICINE

## 2021-09-14 PROCEDURE — 81003 URINALYSIS AUTO W/O SCOPE: CPT | Performed by: INTERNAL MEDICINE

## 2021-09-14 PROCEDURE — DOTPHY: Performed by: INTERNAL MEDICINE

## 2021-10-20 ENCOUNTER — OFFICE VISIT (OUTPATIENT)
Dept: FAMILY MEDICINE CLINIC | Facility: CLINIC | Age: 46
End: 2021-10-20

## 2021-10-20 VITALS
SYSTOLIC BLOOD PRESSURE: 140 MMHG | TEMPERATURE: 98.4 F | OXYGEN SATURATION: 99 % | HEART RATE: 67 BPM | WEIGHT: 225.6 LBS | HEIGHT: 74 IN | DIASTOLIC BLOOD PRESSURE: 92 MMHG | BODY MASS INDEX: 28.95 KG/M2

## 2021-10-20 DIAGNOSIS — G89.29 CHRONIC INTRACTABLE HEADACHE, UNSPECIFIED HEADACHE TYPE: Chronic | ICD-10-CM

## 2021-10-20 DIAGNOSIS — K57.92 ACUTE DIVERTICULITIS: Primary | ICD-10-CM

## 2021-10-20 DIAGNOSIS — G25.81 RESTLESS LEG SYNDROME: Chronic | ICD-10-CM

## 2021-10-20 DIAGNOSIS — R51.9 CHRONIC INTRACTABLE HEADACHE, UNSPECIFIED HEADACHE TYPE: Chronic | ICD-10-CM

## 2021-10-20 PROCEDURE — 99214 OFFICE O/P EST MOD 30 MIN: CPT | Performed by: NURSE PRACTITIONER

## 2021-10-20 PROCEDURE — 85025 COMPLETE CBC W/AUTO DIFF WBC: CPT | Performed by: NURSE PRACTITIONER

## 2021-10-20 PROCEDURE — 80053 COMPREHEN METABOLIC PANEL: CPT | Performed by: NURSE PRACTITIONER

## 2021-10-20 PROCEDURE — 36415 COLL VENOUS BLD VENIPUNCTURE: CPT | Performed by: NURSE PRACTITIONER

## 2021-10-20 RX ORDER — AMITRIPTYLINE HYDROCHLORIDE 25 MG/1
TABLET, FILM COATED ORAL
Qty: 30 TABLET | Refills: 5 | Status: SHIPPED | OUTPATIENT
Start: 2021-10-20 | End: 2022-04-12

## 2021-10-20 RX ORDER — ROPINIROLE 1 MG/1
TABLET, FILM COATED ORAL
Qty: 30 TABLET | Refills: 0 | Status: SHIPPED | OUTPATIENT
Start: 2021-10-20 | End: 2022-05-02 | Stop reason: SDUPTHER

## 2021-10-20 RX ORDER — METRONIDAZOLE 500 MG/1
500 TABLET ORAL 3 TIMES DAILY
Qty: 30 TABLET | Refills: 0 | Status: SHIPPED | OUTPATIENT
Start: 2021-10-20 | End: 2021-11-01

## 2021-10-20 RX ORDER — CIPROFLOXACIN 500 MG/1
500 TABLET, FILM COATED ORAL 2 TIMES DAILY
Qty: 20 TABLET | Refills: 0 | Status: SHIPPED | OUTPATIENT
Start: 2021-10-20 | End: 2021-11-01

## 2021-10-20 RX ORDER — IBUPROFEN 800 MG/1
TABLET ORAL
COMMUNITY
Start: 2021-09-07 | End: 2021-11-10 | Stop reason: HOSPADM

## 2021-10-20 NOTE — PROGRESS NOTES
"Subjective   Devan Alarcon is a 45 y.o. male.     Chief Complaint   Patient presents with   • Diverticulitis       He presents with c/o diverticulitis flare since Saturday. He c/o a sharp pain like a cramp on the left lower abdomen. He states it is tender. He c/o chills. He denies fever. He has chronic diarrhea. He denies blood in the stool, nausea, and vomiting. He states he is having profuse salivation from time to time. He has been taking flagyl.        The following portions of the patient's history were reviewed and updated as appropriate: allergies, current medications, past family history, past medical history, past social history, past surgical history and problem list.    Review of Systems   Constitutional: Negative for fever and unexpected weight change.   HENT: Negative for ear pain, rhinorrhea, sore throat and trouble swallowing.    Eyes: Negative for visual disturbance.   Respiratory: Negative for cough, shortness of breath and wheezing.    Cardiovascular: Negative for chest pain and palpitations.   Gastrointestinal: Positive for abdominal pain and diarrhea. Negative for blood in stool, constipation, nausea and vomiting.   Genitourinary: Negative for dysuria.   Musculoskeletal: Negative for arthralgias and myalgias.   Skin: Negative for color change.   Allergic/Immunologic: Negative for environmental allergies.   Neurological: Negative for dizziness.   Hematological: Negative for adenopathy.   Psychiatric/Behavioral: Negative for sleep disturbance and suicidal ideas. The patient is not nervous/anxious.        Objective     /92 (BP Location: Left arm, Patient Position: Sitting, Cuff Size: Adult)   Pulse 67   Temp 98.4 °F (36.9 °C) (Temporal)   Ht 188 cm (74.02\")   Wt 102 kg (225 lb 9.6 oz)   SpO2 99%   BMI 28.95 kg/m²     Physical Exam  Vitals reviewed.   Constitutional:       General: He is not in acute distress.     Appearance: He is well-developed. He is not diaphoretic.   HENT:      Head: " Normocephalic and atraumatic.   Cardiovascular:      Rate and Rhythm: Normal rate and regular rhythm.      Heart sounds: Normal heart sounds. No murmur heard.  No friction rub. No gallop.    Pulmonary:      Effort: Pulmonary effort is normal. No respiratory distress.      Breath sounds: Normal breath sounds.   Abdominal:      General: Bowel sounds are normal. There is no distension.      Palpations: Abdomen is soft.      Tenderness: There is abdominal tenderness in the left lower quadrant. There is no guarding or rebound.   Skin:     General: Skin is warm and dry.   Neurological:      Mental Status: He is alert and oriented to person, place, and time.   Psychiatric:         Behavior: Behavior normal.         Thought Content: Thought content normal.         Judgment: Judgment normal.         Assessment/Plan     Problem List Items Addressed This Visit     None      Visit Diagnoses     Acute diverticulitis    -  Primary    Relevant Medications    ciprofloxacin (Cipro) 500 MG tablet    metroNIDAZOLE (Flagyl) 500 MG tablet    Other Relevant Orders    Ambulatory Referral to Gastroenterology (Completed)    CBC & Differential    Comprehensive Metabolic Panel          Plan: Get cbc and cmp. Start cipro and flagyl for acute diverticulitis. Refer to GI as he states he is having symptoms monthly and is watching his diet as well as taking a fiber supplement. Go to ER with fever or severe pain. Follow up in 10 days and as needed.     @Body mass index is 28.95 kg/m².              Understands disease processes and need for medications.  Understands reasons for urgent and emergent care.  Patient (& family) verbalized agreement for treatment plan.   Emotional support and active listening provided.  Patient provided time to verbalize feelings.               This document has been electronically signed by TONO Butts   October 20, 2021 11:42 EDT

## 2021-10-21 LAB
ALBUMIN SERPL-MCNC: 4.5 G/DL (ref 3.5–5.2)
ALBUMIN/GLOB SERPL: 1.2 G/DL
ALP SERPL-CCNC: 103 U/L (ref 39–117)
ALT SERPL W P-5'-P-CCNC: 24 U/L (ref 1–41)
ANION GAP SERPL CALCULATED.3IONS-SCNC: 12.6 MMOL/L (ref 5–15)
AST SERPL-CCNC: 17 U/L (ref 1–40)
BASOPHILS # BLD AUTO: 0.04 10*3/MM3 (ref 0–0.2)
BASOPHILS NFR BLD AUTO: 0.5 % (ref 0–1.5)
BILIRUB SERPL-MCNC: 0.5 MG/DL (ref 0–1.2)
BUN SERPL-MCNC: 9 MG/DL (ref 6–20)
BUN/CREAT SERPL: 11.3 (ref 7–25)
CALCIUM SPEC-SCNC: 9.2 MG/DL (ref 8.6–10.5)
CHLORIDE SERPL-SCNC: 103 MMOL/L (ref 98–107)
CO2 SERPL-SCNC: 25.4 MMOL/L (ref 22–29)
CREAT SERPL-MCNC: 0.8 MG/DL (ref 0.76–1.27)
DEPRECATED RDW RBC AUTO: 43.2 FL (ref 37–54)
EOSINOPHIL # BLD AUTO: 0.13 10*3/MM3 (ref 0–0.4)
EOSINOPHIL NFR BLD AUTO: 1.5 % (ref 0.3–6.2)
ERYTHROCYTE [DISTWIDTH] IN BLOOD BY AUTOMATED COUNT: 13.7 % (ref 12.3–15.4)
GFR SERPL CREATININE-BSD FRML MDRD: 104 ML/MIN/1.73
GLOBULIN UR ELPH-MCNC: 3.7 GM/DL
GLUCOSE SERPL-MCNC: 85 MG/DL (ref 65–99)
HCT VFR BLD AUTO: 47.7 % (ref 37.5–51)
HGB BLD-MCNC: 16 G/DL (ref 13–17.7)
IMM GRANULOCYTES # BLD AUTO: 0.03 10*3/MM3 (ref 0–0.05)
IMM GRANULOCYTES NFR BLD AUTO: 0.3 % (ref 0–0.5)
LYMPHOCYTES # BLD AUTO: 2.23 10*3/MM3 (ref 0.7–3.1)
LYMPHOCYTES NFR BLD AUTO: 25.5 % (ref 19.6–45.3)
MCH RBC QN AUTO: 29.4 PG (ref 26.6–33)
MCHC RBC AUTO-ENTMCNC: 33.5 G/DL (ref 31.5–35.7)
MCV RBC AUTO: 87.5 FL (ref 79–97)
MONOCYTES # BLD AUTO: 0.68 10*3/MM3 (ref 0.1–0.9)
MONOCYTES NFR BLD AUTO: 7.8 % (ref 5–12)
NEUTROPHILS NFR BLD AUTO: 5.62 10*3/MM3 (ref 1.7–7)
NEUTROPHILS NFR BLD AUTO: 64.4 % (ref 42.7–76)
NRBC BLD AUTO-RTO: 0 /100 WBC (ref 0–0.2)
PLATELET # BLD AUTO: 313 10*3/MM3 (ref 140–450)
PMV BLD AUTO: 11.6 FL (ref 6–12)
POTASSIUM SERPL-SCNC: 4.8 MMOL/L (ref 3.5–5.2)
PROT SERPL-MCNC: 8.2 G/DL (ref 6–8.5)
RBC # BLD AUTO: 5.45 10*6/MM3 (ref 4.14–5.8)
SODIUM SERPL-SCNC: 141 MMOL/L (ref 136–145)
WBC # BLD AUTO: 8.73 10*3/MM3 (ref 3.4–10.8)

## 2021-10-25 ENCOUNTER — PATIENT MESSAGE (OUTPATIENT)
Dept: FAMILY MEDICINE CLINIC | Facility: CLINIC | Age: 46
End: 2021-10-25

## 2021-10-26 ENCOUNTER — OFFICE VISIT (OUTPATIENT)
Dept: GASTROENTEROLOGY | Facility: CLINIC | Age: 46
End: 2021-10-26

## 2021-10-26 VITALS — HEART RATE: 68 BPM | HEIGHT: 73 IN | BODY MASS INDEX: 30.48 KG/M2 | OXYGEN SATURATION: 98 % | WEIGHT: 230 LBS

## 2021-10-26 DIAGNOSIS — Z01.818 PREOPERATIVE CLEARANCE: ICD-10-CM

## 2021-10-26 DIAGNOSIS — K57.92 DIVERTICULITIS: ICD-10-CM

## 2021-10-26 DIAGNOSIS — R19.7 DIARRHEA, UNSPECIFIED TYPE: Chronic | ICD-10-CM

## 2021-10-26 DIAGNOSIS — K57.92 DIVERTICULITIS: Primary | ICD-10-CM

## 2021-10-26 DIAGNOSIS — D50.9 IRON DEFICIENCY ANEMIA, UNSPECIFIED IRON DEFICIENCY ANEMIA TYPE: Primary | ICD-10-CM

## 2021-10-26 PROCEDURE — 99214 OFFICE O/P EST MOD 30 MIN: CPT | Performed by: PHYSICIAN ASSISTANT

## 2021-10-26 RX ORDER — MAGNESIUM CARB/ALUMINUM HYDROX 105-160MG
296 TABLET,CHEWABLE ORAL TAKE AS DIRECTED
Qty: 592 ML | Refills: 0 | Status: SHIPPED | OUTPATIENT
Start: 2021-10-26 | End: 2022-05-02

## 2021-10-26 RX ORDER — BISACODYL 5 MG/1
20 TABLET, DELAYED RELEASE ORAL ONCE
Qty: 4 TABLET | Refills: 0 | Status: SHIPPED | OUTPATIENT
Start: 2021-10-26 | End: 2021-10-26

## 2021-10-26 RX ORDER — MONTELUKAST SODIUM 4 MG/1
TABLET, CHEWABLE ORAL
Qty: 90 TABLET | Refills: 5 | Status: SHIPPED | OUTPATIENT
Start: 2021-10-26 | End: 2022-05-02

## 2021-10-26 NOTE — PROGRESS NOTES
Chief Complaint   Patient presents with   • Diverticulitis   • Abdominal Pain       Devan Alarcon is a 45 y.o. male who presents to the office today for evaluation of Diverticulitis and Abdominal Pain  .    HPI  Patient presents the clinic today for diverticulitis and left lower quadrant pain.  His PCP recently diagnosed him with diverticulitis and started him on a 10-day treatment of Cipro and Flagyl.  Patient states he has been taking the antibiotics for roughly 4-5 days now.  He has had several past treatments of Cipro and Flagyl for diverticulitis.  He states that he will have to take antibiotics almost monthly for flareups.  This has been going on for some time now.  He has had this issue now going on now for some time.  He is very careful with his diet and avoids anything with seeds.  Despite this he still has almost daily abdominal pain caused by diverticulosis.  He states antibiotic therapy does generally lessen the pain however it rarely goes away.  He has had several PET scans that he states showed a small area on his left side that has several diverticula.  It is also been seen on colonoscopy previously as well.    He has a past history of Zapien's esophagus which developed into esophageal cancer.  He had a esophagectomy performed at [facility] in [place].  Having this procedure has caused him to have dumping syndrome in which he has chronic diarrhea.    Review of Systems   Constitutional: Negative.    HENT: Negative for sore throat and trouble swallowing.    Eyes: Negative.    Respiratory: Negative for chest tightness.    Cardiovascular: Negative for chest pain.   Gastrointestinal: Positive for abdominal distention, abdominal pain, nausea and vomiting. Negative for anal bleeding, blood in stool, constipation, diarrhea and rectal pain.   Endocrine: Negative.    Genitourinary: Negative for difficulty urinating.   Musculoskeletal: Positive for back pain.   Skin: Negative.    Allergic/Immunologic: Negative  for environmental allergies and food allergies.   Neurological: Positive for dizziness. Negative for headaches.   Hematological: Does not bruise/bleed easily.   Psychiatric/Behavioral: Positive for agitation and sleep disturbance. The patient is nervous/anxious.        ACTIVE PROBLEMS:   Specialty Problems        Gastroenterology Problems    Gastroesophageal reflux disease              PAST MEDICAL HISTORY:  Past Medical History:   Diagnosis Date   • Arm fracture     left arm    • Arthritis     knee and hands   • Esophageal cancer (HCC)     Oct 2018   • Esophageal cancer (HCC)    • GERD (gastroesophageal reflux disease)    • History of migraine headaches    • Hyperlipidemia        SURGICAL HISTORY:  Past Surgical History:   Procedure Laterality Date   • COLONOSCOPY     • ENDOSCOPY N/A 9/20/2018    Procedure: ESOPHAGOGASTRODUODENOSCOPY WITH BIOPSY CPT CODE: 33979;  Surgeon: Baron Mccall MD;  Location: [facility];  Service: Gastroenterology   • ENDOSCOPY N/A 10/17/2018    Procedure: ESOPHAGOGASTRODUODENOSCOPY WITH BIOPSY CPT CODE: 38138;  Surgeon: Baron Mccall MD;  Location: [facility];  Service: Gastroenterology   • ENDOSCOPY      with ablation   • ESOPHAGECTOMY     • MOUTH SURGERY     • UPPER GASTROINTESTINAL ENDOSCOPY     • VENTRAL/INCISIONAL HERNIA REPAIR N/A 7/12/2019    Procedure: VENTRAL/INCISIONAL HERNIA REPAIR LAPAROSCOPIC;  Surgeon: Jaswinder Goodrich MD;  Location: [facility];  Service: General       FAMILY HISTORY:  Family History   Problem Relation Age of Onset   • Osteoporosis Mother    • No Known Problems Father        SOCIAL HISTORY:  Social History     Tobacco Use   • Smoking status: Current Every Day Smoker     Packs/day: 0.00     Years: 25.00     Pack years: 0.00     Types: Pipe   • Smokeless tobacco: Never Used   Substance Use Topics   • Alcohol use: No       CURRENT MEDICATION:    Current Outpatient Medications:   •  albuterol sulfate  (90 Base) MCG/ACT inhaler, Inhale 2 puffs  "Every 4 (Four) Hours As Needed for Wheezing., Disp: 18 g, Rfl: 2  •  amitriptyline (ELAVIL) 25 MG tablet, TAKE ONE TABLET BY MOUTH TWO TIMES A DAY AS DIRECTED, Disp: 30 tablet, Rfl: 5  •  cetirizine (zyrTEC) 10 MG tablet, Take 1 tablet by mouth Daily. For allergies, Disp: 30 tablet, Rfl: 5  •  ciprofloxacin (Cipro) 500 MG tablet, Take 1 tablet by mouth 2 (Two) Times a Day., Disp: 20 tablet, Rfl: 0  •  colestipol (COLESTID) 1 g tablet, Take 1 tablet by mouth Daily. Can take 1-3 tablets PRN diarrhea., Disp: 90 tablet, Rfl: 5  •  dexlansoprazole (Dexilant) 60 MG capsule, Take 1 capsule by mouth Daily., Disp: 30 capsule, Rfl: 5  •  ibuprofen (ADVIL,MOTRIN) 800 MG tablet, TAKE ONE TABLET BY MOUTH THREE TIMES A DAY FOR PAIN AND INFLAMATION, Disp: , Rfl:   •  metroNIDAZOLE (Flagyl) 500 MG tablet, Take 1 tablet by mouth 3 (Three) Times a Day., Disp: 30 tablet, Rfl: 0  •  ondansetron ODT (ZOFRAN-ODT) 8 MG disintegrating tablet, Take 1 tablet by mouth Every 6 (Six) Hours As Needed for Nausea or Vomiting., Disp: 42 tablet, Rfl: 5  •  rOPINIRole (REQUIP) 1 MG tablet, TAKE ONE TABLET BY MOUTH 1-3 HOURS BEFORE BEDTIME FOR RESTLESS LEGS, Disp: 30 tablet, Rfl: 0  •  bisacodyl (DULCOLAX) 5 MG EC tablet, Take 4 tablets by mouth 1 (One) Time for 1 dose. Take 4 tablets at 4:00 PM the day before procedure, Disp: 4 tablet, Rfl: 0  •  ferrous gluconate (FERGON) 324 MG tablet, Take 1 tablet by mouth 2 (Two) Times a Day With Meals., Disp: 60 tablet, Rfl: 5  •  Lactobacillus (ACIDOPHILUS) capsule, TAKE ONE CAPSULE BY MOUTH TWO TIMES A DAY AS DIRECTED, Disp: 60 each, Rfl: 3  •  magnesium citrate 1.745 GM/30ML solution solution, Take 296 mL by mouth Take As Directed for 2 doses. Take one full bottle at 4:00 PM and take second bottle at 10:00 PM., Disp: 592 mL, Rfl: 0    ALLERGIES:  Patient has no known allergies.    VISIT VITALS:  Pulse 68   Ht 185.4 cm (73\")   Wt 104 kg (230 lb)   SpO2 98%   BMI 30.34 kg/m²   Physical " Exam  Constitutional:       General: He is not in acute distress.     Appearance: Normal appearance. He is well-developed.   HENT:      Head: Normocephalic and atraumatic.   Eyes:      Pupils: Pupils are equal, round, and reactive to light.   Cardiovascular:      Rate and Rhythm: Normal rate and regular rhythm.      Heart sounds: Normal heart sounds.   Pulmonary:      Effort: Pulmonary effort is normal. No respiratory distress.      Breath sounds: Normal breath sounds. No wheezing, rhonchi or rales.   Abdominal:      General: Abdomen is flat. Bowel sounds are normal. There is distension.      Palpations: Abdomen is soft. There is no mass.      Tenderness: There is abdominal tenderness (LLQ). There is no guarding or rebound.      Hernia: No hernia is present.   Musculoskeletal:         General: No swelling. Normal range of motion.      Cervical back: Normal range of motion and neck supple.      Right lower leg: No edema.      Left lower leg: No edema.   Skin:     General: Skin is warm and dry.   Neurological:      Mental Status: He is alert and oriented to person, place, and time.   Psychiatric:         Attention and Perception: Attention normal.         Mood and Affect: Mood normal.         Speech: Speech normal.         Behavior: Behavior normal. Behavior is cooperative.         Thought Content: Thought content normal.           Assessment/Plan   Due to the patient's symptoms of recurrent diverticulitis, he will finish his current antibiotic therapy of Cipro and Flagyl in which he has 4 to 5 days left.  We will wait roughly 2 weeks post treatment and perform a colonoscopy.  The procedure will be performed by Dr. Maynard using a mag citrate and Dulcolax colon prep.  The procedure was explained to him and he voiced understanding and agreement to the treatment plan.   Diagnosis Plan   1. Diverticulitis  Case Request    Case Request    COVID PRE-OP / PRE-PROCEDURE SCREENING ORDER (NO ISOLATION) - Swab, Nasopharynx     magnesium citrate 1.745 GM/30ML solution solution    bisacodyl (DULCOLAX) 5 MG EC tablet       Return After procedure.                   This document has been electronically signed by Mich Florez PA-C  October 26, 2021 12:56 EDT    Part of this note may be an electronic transcription/translation of spoken language to printed text using the Dragon Dictation System.

## 2021-11-01 ENCOUNTER — OFFICE VISIT (OUTPATIENT)
Dept: FAMILY MEDICINE CLINIC | Facility: CLINIC | Age: 46
End: 2021-11-01

## 2021-11-01 VITALS
RESPIRATION RATE: 16 BRPM | SYSTOLIC BLOOD PRESSURE: 134 MMHG | HEIGHT: 73 IN | WEIGHT: 228 LBS | BODY MASS INDEX: 30.22 KG/M2 | OXYGEN SATURATION: 97 % | DIASTOLIC BLOOD PRESSURE: 77 MMHG | HEART RATE: 75 BPM | TEMPERATURE: 97.5 F

## 2021-11-01 DIAGNOSIS — K57.92 ACUTE DIVERTICULITIS: Primary | ICD-10-CM

## 2021-11-01 PROCEDURE — 99213 OFFICE O/P EST LOW 20 MIN: CPT | Performed by: NURSE PRACTITIONER

## 2021-11-01 NOTE — PROGRESS NOTES
"Subjective   Devan Alarcon is a 45 y.o. male.     Chief Complaint   Patient presents with   • acute diverticulitis     follow up       He presents for follow up of acute diverticulitis. He states the sharp pain is much better. He does still have some pain. He states he saw gastroenterology last week and has a scope the week after next. Note reviewed. He would like to have the diverticuli removed so he doesn't continue to get infections.       The following portions of the patient's history were reviewed and updated as appropriate: allergies, current medications, past family history, past medical history, past social history, past surgical history and problem list.    Review of Systems   Constitutional: Negative for fever and unexpected weight change.   HENT: Negative for ear pain, rhinorrhea, sore throat and trouble swallowing.    Eyes: Negative for visual disturbance.   Respiratory: Negative for cough, shortness of breath and wheezing.    Cardiovascular: Negative for chest pain and palpitations.   Gastrointestinal: Positive for abdominal pain. Negative for blood in stool, constipation, diarrhea, nausea and vomiting.   Genitourinary: Negative for dysuria.   Musculoskeletal: Negative for arthralgias and myalgias.   Skin: Negative for color change.   Allergic/Immunologic: Negative for environmental allergies.   Neurological: Negative for dizziness.   Hematological: Negative for adenopathy.   Psychiatric/Behavioral: Negative for sleep disturbance and suicidal ideas. The patient is not nervous/anxious.        Objective     /77 (BP Location: Right arm, Patient Position: Sitting, Cuff Size: Adult)   Pulse 75   Temp 97.5 °F (36.4 °C) (Temporal)   Resp 16   Ht 185.4 cm (72.99\")   Wt 103 kg (228 lb)   SpO2 97%   BMI 30.09 kg/m²     Physical Exam  Vitals reviewed.   Constitutional:       General: He is not in acute distress.     Appearance: He is well-developed. He is not diaphoretic.   HENT:      Head: " Normocephalic and atraumatic.   Cardiovascular:      Rate and Rhythm: Normal rate and regular rhythm.      Heart sounds: Normal heart sounds. No murmur heard.  No friction rub. No gallop.    Pulmonary:      Effort: Pulmonary effort is normal. No respiratory distress.      Breath sounds: Normal breath sounds.   Abdominal:      General: Bowel sounds are normal. There is no distension.      Palpations: Abdomen is soft.      Tenderness: There is abdominal tenderness in the left lower quadrant. There is no right CVA tenderness, left CVA tenderness, guarding or rebound.   Skin:     General: Skin is warm and dry.   Neurological:      Mental Status: He is alert and oriented to person, place, and time.   Psychiatric:         Behavior: Behavior normal.         Thought Content: Thought content normal.         Judgment: Judgment normal.         Assessment/Plan     Problem List Items Addressed This Visit     None      Visit Diagnoses     Acute diverticulitis    -  Primary          Plan: I recommend you resume the benefiber daily. Have the colonoscopy. The doctor will assess the extent of the diverticulosis to determine if surgery is something you need or if you can be treated with a high fiber diet. Keep follow up with GI and follow up here in 6 months and as needed.     @Body mass index is 30.09 kg/m².                Understands disease processes and need for medications.  Understands reasons for urgent and emergent care.  Patient (& family) verbalized agreement for treatment plan.   Emotional support and active listening provided.  Patient provided time to verbalize feelings.               This document has been electronically signed by TONO Butts   November 1, 2021 09:11 EDT

## 2021-11-02 PROBLEM — K57.92 DIVERTICULITIS: Status: ACTIVE | Noted: 2021-11-02

## 2021-11-08 ENCOUNTER — LAB (OUTPATIENT)
Dept: LAB | Facility: HOSPITAL | Age: 46
End: 2021-11-08

## 2021-11-08 DIAGNOSIS — Z01.818 PREOPERATIVE CLEARANCE: ICD-10-CM

## 2021-11-08 DIAGNOSIS — D50.9 IRON DEFICIENCY ANEMIA, UNSPECIFIED IRON DEFICIENCY ANEMIA TYPE: ICD-10-CM

## 2021-11-08 DIAGNOSIS — K57.92 DIVERTICULITIS: ICD-10-CM

## 2021-11-08 PROCEDURE — U0004 COV-19 TEST NON-CDC HGH THRU: HCPCS

## 2021-11-08 PROCEDURE — C9803 HOPD COVID-19 SPEC COLLECT: HCPCS

## 2021-11-09 LAB — SARS-COV-2 RNA PNL SPEC NAA+PROBE: NOT DETECTED

## 2021-11-10 ENCOUNTER — ANESTHESIA EVENT (OUTPATIENT)
Dept: PERIOP | Facility: HOSPITAL | Age: 46
End: 2021-11-10

## 2021-11-10 ENCOUNTER — ANESTHESIA (OUTPATIENT)
Dept: PERIOP | Facility: HOSPITAL | Age: 46
End: 2021-11-10

## 2021-11-10 ENCOUNTER — HOSPITAL ENCOUNTER (OUTPATIENT)
Facility: HOSPITAL | Age: 46
Setting detail: HOSPITAL OUTPATIENT SURGERY
Discharge: HOME | End: 2021-11-10
Attending: INTERNAL MEDICINE | Admitting: INTERNAL MEDICINE

## 2021-11-10 VITALS
TEMPERATURE: 97.8 F | BODY MASS INDEX: 28.6 KG/M2 | RESPIRATION RATE: 16 BRPM | HEIGHT: 75 IN | SYSTOLIC BLOOD PRESSURE: 120 MMHG | HEART RATE: 64 BPM | WEIGHT: 230 LBS | OXYGEN SATURATION: 100 % | DIASTOLIC BLOOD PRESSURE: 77 MMHG

## 2021-11-10 DIAGNOSIS — K57.92 DIVERTICULITIS: ICD-10-CM

## 2021-11-10 PROCEDURE — 45385 COLONOSCOPY W/LESION REMOVAL: CPT | Performed by: INTERNAL MEDICINE

## 2021-11-10 PROCEDURE — 25010000002 PROPOFOL 10 MG/ML EMULSION: Performed by: NURSE ANESTHETIST, CERTIFIED REGISTERED

## 2021-11-10 PROCEDURE — C1889 IMPLANT/INSERT DEVICE, NOC: HCPCS | Performed by: INTERNAL MEDICINE

## 2021-11-10 DEVICE — CLIPPING DEVICE
Type: IMPLANTABLE DEVICE | Site: SIGMOID COLON | Status: FUNCTIONAL
Brand: RESOLUTION CLIP

## 2021-11-10 RX ORDER — DROPERIDOL 2.5 MG/ML
0.62 INJECTION, SOLUTION INTRAMUSCULAR; INTRAVENOUS ONCE AS NEEDED
Status: DISCONTINUED | OUTPATIENT
Start: 2021-11-10 | End: 2021-11-10 | Stop reason: HOSPADM

## 2021-11-10 RX ORDER — SODIUM CHLORIDE, SODIUM LACTATE, POTASSIUM CHLORIDE, CALCIUM CHLORIDE 600; 310; 30; 20 MG/100ML; MG/100ML; MG/100ML; MG/100ML
100 INJECTION, SOLUTION INTRAVENOUS ONCE AS NEEDED
Status: DISCONTINUED | OUTPATIENT
Start: 2021-11-10 | End: 2021-11-10 | Stop reason: HOSPADM

## 2021-11-10 RX ORDER — KETOROLAC TROMETHAMINE 30 MG/ML
30 INJECTION, SOLUTION INTRAMUSCULAR; INTRAVENOUS EVERY 6 HOURS PRN
Status: DISCONTINUED | OUTPATIENT
Start: 2021-11-10 | End: 2021-11-10 | Stop reason: HOSPADM

## 2021-11-10 RX ORDER — IPRATROPIUM BROMIDE AND ALBUTEROL SULFATE 2.5; .5 MG/3ML; MG/3ML
3 SOLUTION RESPIRATORY (INHALATION) ONCE AS NEEDED
Status: DISCONTINUED | OUTPATIENT
Start: 2021-11-10 | End: 2021-11-10 | Stop reason: HOSPADM

## 2021-11-10 RX ORDER — SODIUM CHLORIDE 0.9 % (FLUSH) 0.9 %
10 SYRINGE (ML) INJECTION AS NEEDED
Status: DISCONTINUED | OUTPATIENT
Start: 2021-11-10 | End: 2021-11-10 | Stop reason: HOSPADM

## 2021-11-10 RX ORDER — SODIUM CHLORIDE, SODIUM LACTATE, POTASSIUM CHLORIDE, CALCIUM CHLORIDE 600; 310; 30; 20 MG/100ML; MG/100ML; MG/100ML; MG/100ML
125 INJECTION, SOLUTION INTRAVENOUS ONCE
Status: DISCONTINUED | OUTPATIENT
Start: 2021-11-10 | End: 2021-11-10 | Stop reason: HOSPADM

## 2021-11-10 RX ORDER — ONDANSETRON 2 MG/ML
4 INJECTION INTRAMUSCULAR; INTRAVENOUS AS NEEDED
Status: DISCONTINUED | OUTPATIENT
Start: 2021-11-10 | End: 2021-11-10 | Stop reason: HOSPADM

## 2021-11-10 RX ORDER — MIDAZOLAM HYDROCHLORIDE 1 MG/ML
1 INJECTION INTRAMUSCULAR; INTRAVENOUS
Status: DISCONTINUED | OUTPATIENT
Start: 2021-11-10 | End: 2021-11-10 | Stop reason: HOSPADM

## 2021-11-10 RX ORDER — SODIUM CHLORIDE, SODIUM LACTATE, POTASSIUM CHLORIDE, CALCIUM CHLORIDE 600; 310; 30; 20 MG/100ML; MG/100ML; MG/100ML; MG/100ML
INJECTION, SOLUTION INTRAVENOUS CONTINUOUS PRN
Status: DISCONTINUED | OUTPATIENT
Start: 2021-11-10 | End: 2021-11-10 | Stop reason: SURG

## 2021-11-10 RX ORDER — SODIUM CHLORIDE 0.9 % (FLUSH) 0.9 %
10 SYRINGE (ML) INJECTION EVERY 12 HOURS SCHEDULED
Status: DISCONTINUED | OUTPATIENT
Start: 2021-11-10 | End: 2021-11-10 | Stop reason: HOSPADM

## 2021-11-10 RX ORDER — DICYCLOMINE HYDROCHLORIDE 10 MG/1
CAPSULE ORAL
Qty: 90 CAPSULE | Refills: 1 | Status: SHIPPED | OUTPATIENT
Start: 2021-11-10 | End: 2022-05-02

## 2021-11-10 RX ORDER — MEPERIDINE HYDROCHLORIDE 25 MG/ML
12.5 INJECTION INTRAMUSCULAR; INTRAVENOUS; SUBCUTANEOUS
Status: DISCONTINUED | OUTPATIENT
Start: 2021-11-10 | End: 2021-11-10 | Stop reason: HOSPADM

## 2021-11-10 RX ORDER — FENTANYL CITRATE 50 UG/ML
50 INJECTION, SOLUTION INTRAMUSCULAR; INTRAVENOUS
Status: DISCONTINUED | OUTPATIENT
Start: 2021-11-10 | End: 2021-11-10 | Stop reason: HOSPADM

## 2021-11-10 RX ORDER — PROPOFOL 10 MG/ML
VIAL (ML) INTRAVENOUS AS NEEDED
Status: DISCONTINUED | OUTPATIENT
Start: 2021-11-10 | End: 2021-11-10 | Stop reason: SURG

## 2021-11-10 RX ORDER — LIDOCAINE HYDROCHLORIDE 20 MG/ML
INJECTION, SOLUTION INFILTRATION; PERINEURAL AS NEEDED
Status: DISCONTINUED | OUTPATIENT
Start: 2021-11-10 | End: 2021-11-10 | Stop reason: SURG

## 2021-11-10 RX ORDER — OXYCODONE HYDROCHLORIDE AND ACETAMINOPHEN 5; 325 MG/1; MG/1
1 TABLET ORAL ONCE AS NEEDED
Status: DISCONTINUED | OUTPATIENT
Start: 2021-11-10 | End: 2021-11-10 | Stop reason: HOSPADM

## 2021-11-10 RX ADMIN — PROPOFOL 30 MG: 10 INJECTION, EMULSION INTRAVENOUS at 11:20

## 2021-11-10 RX ADMIN — SODIUM CHLORIDE, POTASSIUM CHLORIDE, SODIUM LACTATE AND CALCIUM CHLORIDE: 600; 310; 30; 20 INJECTION, SOLUTION INTRAVENOUS at 11:18

## 2021-11-10 RX ADMIN — LIDOCAINE HYDROCHLORIDE 60 MG: 20 INJECTION, SOLUTION INFILTRATION; PERINEURAL at 11:20

## 2021-11-10 RX ADMIN — PROPOFOL 100 MCG/KG/MIN: 10 INJECTION, EMULSION INTRAVENOUS at 11:20

## 2021-11-10 NOTE — OP NOTE
COLONOSCOPY PROCEDURE NOTE    Devan Alarcon  11/10/2021    GASTROENTEROLOGIST:  Joi Bains MD      PRE-PROCEDURE DIAGNOSIS:   Diverticulitis [K57.92]    POST-PROCEDURE DIAGNOSIS:  1.  Left-sided diverticulosis  2.  Resolving diverticulitis  3.  Colon polyp  4.  Internal hemorrhoids    PROCEDURE:  COLONOSCOPY with snare polypectomy    ANESTHESIA:  Propofol administered by anesthesia.  See anesthesia notes for ASA classification    STAFF  Circulator: Eva Fischer RN  Endo Technician: Lior Colbert    Findings:  1.  Left-sided diverticulosis with resolving diverticulitis.  2.  An 8 mm polyp was removed from the sigmoid colon with cold snare polypectomy.  There was post polypectomy bleeding.  This was treated with 3 endoclips.  There was no bleeding after placement of the endoclips.  3.  Small internal hemorrhoids.    OPERATIVE PROCEDURE   After proper informed consent was obtained, the patient was taken the operating suite and placed in left lateral decubitus position.  An Olympus video colonoscope 180 series was inserted in the rectum and advanced to the terminal ileum under direct visualization.  Cecum and terminal ileum were identified by visualization of the appendiceal orifice and ileocecal valve.  The colonoscope was then slowly withdrawn from the cecum to the rectum and passed a second time from rectum to cecum.  The colonoscope was retroflexed in the cecum and rectum. Scope was then withdrawn. Patient tolerated the procedure well. There were no immediate complications. Cecal withdrawal time was 15 minutes.    ESTIMATED BLOOD LOSS  None     COMPLICATIONS  None    RECOMMENDATIONS:  1.  Follow-up pathology.  2.  Begin fiber supplement daily.  The patient should take FiberCon 2 caplets daily with 8 ounces of fluid.  3.  Follow-up in GI clinic with LEEANNE Lee.    Joi Bains MD  11/10/21 11:53 EST

## 2021-11-10 NOTE — ANESTHESIA POSTPROCEDURE EVALUATION
Patient: Devan Alarcon    Procedure Summary     Date: 11/10/21 Room / Location: [facility] OR [address] [place] [address] COR OR    Anesthesia Start: 1118 Anesthesia Stop: 1148    Procedure: COLONOSCOPY (N/A ) Diagnosis:       Diverticulitis      (Diverticulitis [K57.92])    Surgeons: Joi Bains MD Provider: Deandre Parks MD    Anesthesia Type: general ASA Status: 2          Anesthesia Type: general    Vitals  Vitals Value Taken Time   /77 11/10/21 1222   Temp 97.8 °F (36.6 °C) 11/10/21 1152   Pulse 64 11/10/21 1222   Resp 16 11/10/21 1222   SpO2 100 % 11/10/21 1222           Post Anesthesia Care and Evaluation    Patient location during evaluation: PHASE II  Patient participation: complete - patient participated  Level of consciousness: awake and alert  Pain score: 0  Pain management: satisfactory to patient  Airway patency: patent  Anesthetic complications: No anesthetic complications  PONV Status: none  Cardiovascular status: hemodynamically stable  Respiratory status: nasal cannula  Hydration status: acceptable

## 2021-11-10 NOTE — ANESTHESIA PREPROCEDURE EVALUATION
Anesthesia Evaluation     Patient summary reviewed and Nursing notes reviewed   no history of anesthetic complications:  NPO Solid Status: > 8 hours  NPO Liquid Status: > 8 hours           Airway   Mallampati: II  TM distance: >3 FB  Neck ROM: full  No difficulty expected  Dental - normal exam   (+) edentulous, lower dentures and upper dentures    Pulmonary - normal exam   (+) a smoker Current Abstained day of surgery,   Cardiovascular - normal exam  Exercise tolerance: good (4-7 METS)    NYHA Classification: II    (+) dysrhythmias Atrial Fib, hyperlipidemia,       Neuro/Psych  (+) headaches, psychiatric history Anxiety,     GI/Hepatic/Renal/Endo    (+)  GERD,    (-) GI bleed    Musculoskeletal     Abdominal  - normal exam    Bowel sounds: normal.   Substance History - negative use  (-) alcohol use, drug use     OB/GYN negative ob/gyn ROS         Other   arthritis,    history of cancer remission    ROS/Med Hx Other: No chemo or XRT.                      Anesthesia Plan    ASA 2     general   (  )  intravenous induction     Anesthetic plan, all risks, benefits, and alternatives have been provided, discussed and informed consent has been obtained with: patient.  Use of blood products discussed with patient  Consented to blood products.   Plan discussed with CRNA.

## 2021-11-12 LAB — LAB AP CASE REPORT: NORMAL

## 2021-11-16 NOTE — PROGRESS NOTES
The polyp removed from your colon was a serrated polyp.  You will need repeat colonoscopy in 5 years.

## 2021-11-19 ENCOUNTER — OFFICE VISIT (OUTPATIENT)
Dept: GASTROENTEROLOGY | Facility: CLINIC | Age: 46
End: 2021-11-19

## 2021-11-19 VITALS
BODY MASS INDEX: 29.82 KG/M2 | SYSTOLIC BLOOD PRESSURE: 149 MMHG | HEIGHT: 73 IN | HEART RATE: 66 BPM | WEIGHT: 225 LBS | DIASTOLIC BLOOD PRESSURE: 98 MMHG

## 2021-11-19 DIAGNOSIS — K57.92 DIVERTICULITIS: Primary | ICD-10-CM

## 2021-11-19 DIAGNOSIS — R10.32 LEFT LOWER QUADRANT ABDOMINAL PAIN: ICD-10-CM

## 2021-11-19 PROCEDURE — 99213 OFFICE O/P EST LOW 20 MIN: CPT | Performed by: PHYSICIAN ASSISTANT

## 2021-11-19 RX ORDER — DICYCLOMINE HCL 20 MG
20 TABLET ORAL 2 TIMES DAILY
Qty: 60 TABLET | Refills: 11 | Status: SHIPPED | OUTPATIENT
Start: 2021-11-19 | End: 2023-01-17 | Stop reason: SDUPTHER

## 2021-11-19 NOTE — PROGRESS NOTES
Chief Complaint   Patient presents with   • Diverticulitis   • Diarrhea       Devan Alarcon is a 46 y.o. male who presents to the office today for evaluation of Diverticulitis and Diarrhea  .    HPI  Patient presents the clinic today for follow-up of diverticulitis and chronic diarrhea.  He was last seen in clinic roughly 1 month ago in which we ordered a anoscopy to be performed.  Dr. Maynard did that procedure on 11/10.  Colonoscopy revealed left-sided diverticulosis, resolving diverticulitis, internal hemorrhoids and a serrated polyp.  According to Dr. Maynard's recommendations he will need to repeat his colonoscopy in 5 years.  Patient states since being seen in clinic last he has not had any additional flareups of diverticulitis.  He has had some abdominal pain lasting 1 to 2 days that seem to be diverticulitis starting to flareup however went away on its own.  He has been taking fiber twice daily to control diarrhea and it does seem to be helping somewhat.  He will still have several bowel movements daily that range from type V-VII on the [place] stool scale.    Review of Systems   Constitutional: Negative.    HENT: Negative for sore throat and trouble swallowing.    Eyes: Negative.    Respiratory: Negative for chest tightness.    Cardiovascular: Negative for chest pain.   Gastrointestinal: Positive for abdominal distention and abdominal pain. Negative for anal bleeding, blood in stool, constipation, diarrhea, nausea, rectal pain and vomiting.   Endocrine: Negative.    Genitourinary: Negative for difficulty urinating.   Musculoskeletal: Positive for back pain.   Skin: Negative.    Allergic/Immunologic: Negative for environmental allergies and food allergies.   Neurological: Positive for dizziness. Negative for headaches.   Hematological: Does not bruise/bleed easily.   Psychiatric/Behavioral: Positive for agitation and sleep disturbance. The patient is nervous/anxious.        ACTIVE PROBLEMS:   Specialty  Problems        Gastroenterology Problems    Gastroesophageal reflux disease              PAST MEDICAL HISTORY:  Past Medical History:   Diagnosis Date   • Arm fracture     left arm    • Arthritis     knee and hands   • Esophageal cancer (HCC)     Oct 2018   • Esophageal cancer (HCC)    • GERD (gastroesophageal reflux disease)    • History of migraine headaches    • Hyperlipidemia        SURGICAL HISTORY:  Past Surgical History:   Procedure Laterality Date   • COLONOSCOPY     • COLONOSCOPY N/A 11/10/2021    Procedure: COLONOSCOPY;  Surgeon: Joi Bains MD;  Location: [facility];  Service: Gastroenterology;  Laterality: N/A;   • ENDOSCOPY N/A 9/20/2018    Procedure: ESOPHAGOGASTRODUODENOSCOPY WITH BIOPSY CPT CODE: 46572;  Surgeon: Baron Mccall MD;  Location: [facility] OR;  Service: Gastroenterology   • ENDOSCOPY N/A 10/17/2018    Procedure: ESOPHAGOGASTRODUODENOSCOPY WITH BIOPSY CPT CODE: 37921;  Surgeon: Baron Mccall MD;  Location: [facility];  Service: Gastroenterology   • ENDOSCOPY      with ablation   • ESOPHAGECTOMY     • MOUTH SURGERY     • UPPER GASTROINTESTINAL ENDOSCOPY     • VENTRAL/INCISIONAL HERNIA REPAIR N/A 7/12/2019    Procedure: VENTRAL/INCISIONAL HERNIA REPAIR LAPAROSCOPIC;  Surgeon: Jaswinder Goodrich MD;  Location: [facility];  Service: General       FAMILY HISTORY:  Family History   Problem Relation Age of Onset   • Osteoporosis Mother    • No Known Problems Father        SOCIAL HISTORY:  Social History     Tobacco Use   • Smoking status: Current Every Day Smoker     Packs/day: 0.00     Years: 25.00     Pack years: 0.00     Types: Pipe     Start date: 10/1/2015   • Smokeless tobacco: Never Used   Substance Use Topics   • Alcohol use: No       CURRENT MEDICATION:    Current Outpatient Medications:   •  albuterol sulfate  (90 Base) MCG/ACT inhaler, Inhale 2 puffs Every 4 (Four) Hours As Needed for Wheezing., Disp: 18 g, Rfl: 2  •  amitriptyline (ELAVIL) 25 MG tablet,  "TAKE ONE TABLET BY MOUTH TWO TIMES A DAY AS DIRECTED, Disp: 30 tablet, Rfl: 5  •  cetirizine (zyrTEC) 10 MG tablet, Take 1 tablet by mouth Daily. For allergies, Disp: 30 tablet, Rfl: 5  •  colestipol (COLESTID) 1 g tablet, TAKE ONE TABLET BY MOUTH EVERY DAY MAY TAKE 1-3 TABLETS AS NEEDED FOR DIARRHEA, Disp: 90 tablet, Rfl: 5  •  dexlansoprazole (Dexilant) 60 MG capsule, Take 1 capsule by mouth Daily., Disp: 30 capsule, Rfl: 5  •  dicyclomine (BENTYL) 10 MG capsule, Take one by mouth 3 times daily 30 minutes before meals, Disp: 90 capsule, Rfl: 1  •  ferrous gluconate (FERGON) 324 MG tablet, Take 1 tablet by mouth 2 (Two) Times a Day With Meals., Disp: 60 tablet, Rfl: 5  •  Lactobacillus (ACIDOPHILUS) capsule, TAKE ONE CAPSULE BY MOUTH TWO TIMES A DAY AS DIRECTED, Disp: 60 each, Rfl: 3  •  magnesium citrate 1.745 GM/30ML solution solution, Take 296 mL by mouth Take As Directed for 2 doses. Take one full bottle at 4:00 PM and take second bottle at 10:00 PM., Disp: 592 mL, Rfl: 0  •  ondansetron ODT (ZOFRAN-ODT) 8 MG disintegrating tablet, Take 1 tablet by mouth Every 6 (Six) Hours As Needed for Nausea or Vomiting., Disp: 42 tablet, Rfl: 5  •  rOPINIRole (REQUIP) 1 MG tablet, TAKE ONE TABLET BY MOUTH 1-3 HOURS BEFORE BEDTIME FOR RESTLESS LEGS, Disp: 30 tablet, Rfl: 0  •  dicyclomine (BENTYL) 20 MG tablet, Take 1 tablet by mouth 2 (Two) Times a Day., Disp: 60 tablet, Rfl: 11    ALLERGIES:  Patient has no known allergies.    VISIT VITALS:  /98   Pulse 66   Ht 185.4 cm (73\")   Wt 102 kg (225 lb)   BMI 29.69 kg/m²   Physical Exam  Constitutional:       General: He is not in acute distress.     Appearance: Normal appearance. He is well-developed.   HENT:      Head: Normocephalic and atraumatic.   Eyes:      Pupils: Pupils are equal, round, and reactive to light.   Cardiovascular:      Rate and Rhythm: Normal rate and regular rhythm.      Heart sounds: Normal heart sounds.   Pulmonary:      Effort: Pulmonary effort " is normal. No respiratory distress.      Breath sounds: Normal breath sounds. No wheezing, rhonchi or rales.   Abdominal:      General: Abdomen is flat. Bowel sounds are normal. There is no distension.      Palpations: Abdomen is soft. There is no mass.      Tenderness: There is no abdominal tenderness. There is no guarding or rebound.      Hernia: No hernia is present.   Musculoskeletal:         General: No swelling. Normal range of motion.      Cervical back: Normal range of motion and neck supple.      Right lower leg: No edema.      Left lower leg: No edema.   Skin:     General: Skin is warm and dry.   Neurological:      Mental Status: He is alert and oriented to person, place, and time.   Psychiatric:         Attention and Perception: Attention normal.         Mood and Affect: Mood normal.         Speech: Speech normal.         Behavior: Behavior normal. Behavior is cooperative.         Thought Content: Thought content normal.           Assessment/Plan   Due to patient's symptoms, we will monitor for further episodes of diverticulitis.  He was told to contact the office if symptoms develop in the episode that she will need to try something other than Cipro Flagyl which she has been taking chronically.  We will try him on Xifaxan if needed.  Results of the patient's colonoscopy were also reviewed with him-he voiced understanding.   Diagnosis Plan   1. Diverticulitis  dicyclomine (BENTYL) 20 MG tablet   2. Left lower quadrant abdominal pain         Return in about 1 year (around 11/19/2022).                   This document has been electronically signed by Mich Florez PA-C  November 28, 2021 20:40 EST    Part of this note may be an electronic transcription/translation of spoken language to printed text using the Dragon Dictation System.

## 2022-04-12 DIAGNOSIS — R51.9 CHRONIC INTRACTABLE HEADACHE, UNSPECIFIED HEADACHE TYPE: Chronic | ICD-10-CM

## 2022-04-12 DIAGNOSIS — G89.29 CHRONIC INTRACTABLE HEADACHE, UNSPECIFIED HEADACHE TYPE: Chronic | ICD-10-CM

## 2022-04-12 RX ORDER — AMITRIPTYLINE HYDROCHLORIDE 25 MG/1
TABLET, FILM COATED ORAL
Qty: 30 TABLET | Refills: 0 | Status: SHIPPED | OUTPATIENT
Start: 2022-04-12 | End: 2022-05-02

## 2022-05-02 ENCOUNTER — OFFICE VISIT (OUTPATIENT)
Dept: FAMILY MEDICINE CLINIC | Facility: CLINIC | Age: 47
End: 2022-05-02

## 2022-05-02 VITALS
HEART RATE: 61 BPM | DIASTOLIC BLOOD PRESSURE: 78 MMHG | OXYGEN SATURATION: 98 % | WEIGHT: 228 LBS | SYSTOLIC BLOOD PRESSURE: 126 MMHG | HEIGHT: 73 IN | BODY MASS INDEX: 30.22 KG/M2 | TEMPERATURE: 97.8 F | RESPIRATION RATE: 18 BRPM

## 2022-05-02 DIAGNOSIS — M79.605 PAIN IN BOTH LOWER EXTREMITIES: ICD-10-CM

## 2022-05-02 DIAGNOSIS — M54.2 NECK PAIN: ICD-10-CM

## 2022-05-02 DIAGNOSIS — E78.2 MIXED HYPERLIPIDEMIA: Primary | Chronic | ICD-10-CM

## 2022-05-02 DIAGNOSIS — Z13.29 SCREENING FOR THYROID DISORDER: ICD-10-CM

## 2022-05-02 DIAGNOSIS — M79.604 PAIN IN BOTH LOWER EXTREMITIES: ICD-10-CM

## 2022-05-02 DIAGNOSIS — I73.9 INTERMITTENT CLAUDICATION: ICD-10-CM

## 2022-05-02 DIAGNOSIS — R53.83 FATIGUE, UNSPECIFIED TYPE: ICD-10-CM

## 2022-05-02 DIAGNOSIS — G25.81 RESTLESS LEG SYNDROME: Chronic | ICD-10-CM

## 2022-05-02 PROCEDURE — 80061 LIPID PANEL: CPT | Performed by: NURSE PRACTITIONER

## 2022-05-02 PROCEDURE — 99214 OFFICE O/P EST MOD 30 MIN: CPT | Performed by: NURSE PRACTITIONER

## 2022-05-02 PROCEDURE — 80050 GENERAL HEALTH PANEL: CPT | Performed by: NURSE PRACTITIONER

## 2022-05-02 PROCEDURE — 96372 THER/PROPH/DIAG INJ SC/IM: CPT | Performed by: NURSE PRACTITIONER

## 2022-05-02 PROCEDURE — 36415 COLL VENOUS BLD VENIPUNCTURE: CPT | Performed by: NURSE PRACTITIONER

## 2022-05-02 RX ORDER — AMITRIPTYLINE HYDROCHLORIDE 10 MG/1
10 TABLET, FILM COATED ORAL NIGHTLY
Qty: 30 TABLET | Refills: 5 | Status: SHIPPED | OUTPATIENT
Start: 2022-05-02

## 2022-05-02 RX ORDER — IBUPROFEN 800 MG/1
TABLET ORAL
COMMUNITY
Start: 2022-03-21

## 2022-05-02 RX ORDER — ROPINIROLE 1 MG/1
1 TABLET, FILM COATED ORAL NIGHTLY
Qty: 30 TABLET | Refills: 5 | Status: SHIPPED | OUTPATIENT
Start: 2022-05-02

## 2022-05-02 RX ORDER — CYCLOBENZAPRINE HCL 5 MG
5 TABLET ORAL 3 TIMES DAILY PRN
Qty: 90 TABLET | Refills: 1 | Status: SHIPPED | OUTPATIENT
Start: 2022-05-02 | End: 2022-07-06

## 2022-05-02 RX ORDER — CYANOCOBALAMIN 1000 UG/ML
1000 INJECTION, SOLUTION INTRAMUSCULAR; SUBCUTANEOUS
Status: SHIPPED | OUTPATIENT
Start: 2022-05-02

## 2022-05-02 RX ADMIN — CYANOCOBALAMIN 1000 MCG: 1000 INJECTION, SOLUTION INTRAMUSCULAR; SUBCUTANEOUS at 10:23

## 2022-05-02 NOTE — PROGRESS NOTES
Venipuncture Blood Specimen Collection  Venipuncture performed in left arm by Lila Arambula MA with good hemostasis. Patient tolerated the procedure well without complications.   05/02/22   Lila Arambula MA

## 2022-05-02 NOTE — PROGRESS NOTES
Injection  Injection performed in right arm by Lila Arambula MA. Patient tolerated the procedure well without complications.  05/02/22   Lila Arambula MA

## 2022-05-02 NOTE — PROGRESS NOTES
Subjective   Devan Alarcon is a 46 y.o. male.     Chief Complaint   Patient presents with   • Hyperlipidemia       He presents for follow up of mixed hyperlipidemia. He c/o pain in his legs since he took a statin years ago. He states he has problems getting his legs motivated. He c/o aching in his legs when he gets up. He states he has restless leg at night. Ache is in the thighs and calves. He just wants to keep his legs still to keep the pain away. He states he wears compression hose. He states he has a bad left knee. He states he does stretches. He saw GI who did a colonoscopy and started him on bentyl. He states the diverticulitis episodes have improved since starting bentyl. GI notes reviewed. He c/o neck pain from an accident years ago. He takes ibuprofen for the neck pain.        The following portions of the patient's history were reviewed and updated as appropriate: allergies, current medications, past family history, past medical history, past social history, past surgical history and problem list.    Review of Systems   Constitutional: Negative for fever and unexpected weight change.   HENT: Negative for ear pain, rhinorrhea, sore throat and trouble swallowing.    Eyes: Negative for visual disturbance.   Respiratory: Negative for cough, shortness of breath and wheezing.    Cardiovascular: Negative for chest pain and palpitations.   Gastrointestinal: Negative for abdominal pain, blood in stool, constipation, diarrhea, nausea and vomiting.   Genitourinary: Negative for dysuria.   Musculoskeletal: Positive for arthralgias, myalgias, neck pain and neck stiffness.   Skin: Negative for color change.   Allergic/Immunologic: Negative for environmental allergies.   Neurological: Negative for dizziness.   Hematological: Negative for adenopathy.   Psychiatric/Behavioral: Negative for sleep disturbance and suicidal ideas. The patient is not nervous/anxious.        Objective     /78 (BP Location: Left arm,  "Patient Position: Sitting, Cuff Size: Adult)   Pulse 61   Temp 97.8 °F (36.6 °C) (Temporal)   Resp 18   Ht 185.4 cm (72.99\")   Wt 103 kg (228 lb)   SpO2 98%   BMI 30.09 kg/m²     Physical Exam  Vitals reviewed.   Constitutional:       General: He is not in acute distress.     Appearance: He is well-developed. He is not diaphoretic.   HENT:      Head: Normocephalic and atraumatic.   Cardiovascular:      Rate and Rhythm: Normal rate and regular rhythm.      Pulses:           Dorsalis pedis pulses are 0 on the right side and 0 on the left side.        Posterior tibial pulses are 0 on the right side and 1+ on the left side.      Heart sounds: Normal heart sounds. No murmur heard.    No friction rub. No gallop.      Comments: Bilateral pretibial nonpitting edema.  Varicosities noted in the ble.   Pulmonary:      Effort: Pulmonary effort is normal. No respiratory distress.      Breath sounds: Normal breath sounds.   Abdominal:      General: Bowel sounds are normal. There is no distension.      Palpations: Abdomen is soft.      Tenderness: There is no abdominal tenderness.   Skin:     General: Skin is warm and dry.   Neurological:      Mental Status: He is alert and oriented to person, place, and time.   Psychiatric:         Behavior: Behavior normal.         Thought Content: Thought content normal.         Judgment: Judgment normal.         Current Outpatient Medications   Medication Sig Dispense Refill   • albuterol sulfate  (90 Base) MCG/ACT inhaler Inhale 2 puffs Every 4 (Four) Hours As Needed for Wheezing. 18 g 2   • cetirizine (zyrTEC) 10 MG tablet Take 1 tablet by mouth Daily. For allergies 30 tablet 5   • dexlansoprazole (Dexilant) 60 MG capsule Take 1 capsule by mouth Daily. 30 capsule 5   • dicyclomine (BENTYL) 20 MG tablet Take 1 tablet by mouth 2 (Two) Times a Day. 60 tablet 11   • ibuprofen (ADVIL,MOTRIN) 800 MG tablet TAKE ONE TABLET BY MOUTH THREE TIMES A DAY FOR PAIN OR INFLAMMATION     • " ondansetron ODT (ZOFRAN-ODT) 8 MG disintegrating tablet Take 1 tablet by mouth Every 6 (Six) Hours As Needed for Nausea or Vomiting. 42 tablet 5   • rOPINIRole (REQUIP) 1 MG tablet Take 1 tablet by mouth Every Night. Take 1 hour before bedtime. 30 tablet 5   • amitriptyline (ELAVIL) 10 MG tablet Take 1 tablet by mouth Every Night. 30 tablet 5   • Cyanocobalamin 1000 MCG/ML kit Inject 1,000 mcg as directed 1 (One) Time Per Week. 4 kit 2   • cyclobenzaprine (FLEXERIL) 5 MG tablet Take 1 tablet by mouth 3 (Three) Times a Day As Needed for Muscle Spasms. 90 tablet 1     Current Facility-Administered Medications   Medication Dose Route Frequency Provider Last Rate Last Admin   • cyanocobalamin injection 1,000 mcg  1,000 mcg Intramuscular Q28 Days Macey Brewer APRN                Assessment/Plan     Problem List Items Addressed This Visit        Cardiac and Vasculature    Hyperlipidemia - Primary    Relevant Orders    CBC & Differential    Comprehensive Metabolic Panel    Lipid Panel       Neuro    Restless leg syndrome    Relevant Medications    amitriptyline (ELAVIL) 10 MG tablet    rOPINIRole (REQUIP) 1 MG tablet      Other Visit Diagnoses     Intermittent claudication (HCC)        Relevant Orders    US Arterial Doppler Lower Extremity Bilateral    Screening for thyroid disorder        Relevant Orders    TSH    Fatigue, unspecified type        Relevant Medications    cyanocobalamin injection 1,000 mcg    Cyanocobalamin 1000 MCG/ML kit    Pain in both lower extremities        Relevant Medications    cyclobenzaprine (FLEXERIL) 5 MG tablet    Other Relevant Orders    Ambulatory Referral to Pain Management (Completed)            ICD-10-CM ICD-9-CM   1. Mixed hyperlipidemia  E78.2 272.2   2. Restless leg syndrome  G25.81 333.94   3. Intermittent claudication (HCC)  I73.9 443.9   4. Screening for thyroid disorder  Z13.29 V77.0   5. Fatigue, unspecified type  R53.83 780.79   6. Pain in both lower extremities  M79.604  729.5    M79.886        Plan: Get labs today. Continue current medications. Get arterial doppler to look for cause of leg pain. Flexeril ordered as needed for muscle pain. Do not take and drive, it may make you sleepy. Try some CoQ10. Try drinking more gatorade/powerade. Refer to pain management for leg and neck pain. Ibuprofen is not good for your stomach. Follow up in one month and as needed.     @Body mass index is 30.09 kg/m².                Understands disease processes and need for medications.  Understands reasons for urgent and emergent care.  Patient (& family) verbalized agreement for treatment plan.   Emotional support and active listening provided.  Patient provided time to verbalize feelings.           BMI is >= 30 and <= 34.9 (Class 1 obesity). The following options were offered after discussion: weight loss educational material (shared in after visit summary)      This document has been electronically signed by TONO Butts   May 2, 2022 09:24 EDT

## 2022-05-03 LAB
ALBUMIN SERPL-MCNC: 4.5 G/DL (ref 3.5–5.2)
ALBUMIN/GLOB SERPL: 1.4 G/DL
ALP SERPL-CCNC: 110 U/L (ref 39–117)
ALT SERPL W P-5'-P-CCNC: 28 U/L (ref 1–41)
ANION GAP SERPL CALCULATED.3IONS-SCNC: 9 MMOL/L (ref 5–15)
AST SERPL-CCNC: 22 U/L (ref 1–40)
BASOPHILS # BLD AUTO: 0.03 10*3/MM3 (ref 0–0.2)
BASOPHILS NFR BLD AUTO: 0.4 % (ref 0–1.5)
BILIRUB SERPL-MCNC: 0.5 MG/DL (ref 0–1.2)
BUN SERPL-MCNC: 12 MG/DL (ref 6–20)
BUN/CREAT SERPL: 13.5 (ref 7–25)
CALCIUM SPEC-SCNC: 9.4 MG/DL (ref 8.6–10.5)
CHLORIDE SERPL-SCNC: 104 MMOL/L (ref 98–107)
CHOLEST SERPL-MCNC: 220 MG/DL (ref 0–200)
CO2 SERPL-SCNC: 27 MMOL/L (ref 22–29)
CREAT SERPL-MCNC: 0.89 MG/DL (ref 0.76–1.27)
DEPRECATED RDW RBC AUTO: 41.1 FL (ref 37–54)
EGFRCR SERPLBLD CKD-EPI 2021: 106.4 ML/MIN/1.73
EOSINOPHIL # BLD AUTO: 0.13 10*3/MM3 (ref 0–0.4)
EOSINOPHIL NFR BLD AUTO: 1.9 % (ref 0.3–6.2)
ERYTHROCYTE [DISTWIDTH] IN BLOOD BY AUTOMATED COUNT: 13.3 % (ref 12.3–15.4)
GLOBULIN UR ELPH-MCNC: 3.3 GM/DL
GLUCOSE SERPL-MCNC: 84 MG/DL (ref 65–99)
HCT VFR BLD AUTO: 46.1 % (ref 37.5–51)
HDLC SERPL-MCNC: 36 MG/DL (ref 40–60)
HGB BLD-MCNC: 15.7 G/DL (ref 13–17.7)
IMM GRANULOCYTES # BLD AUTO: 0.01 10*3/MM3 (ref 0–0.05)
IMM GRANULOCYTES NFR BLD AUTO: 0.1 % (ref 0–0.5)
LDLC SERPL CALC-MCNC: 161 MG/DL (ref 0–100)
LDLC/HDLC SERPL: 4.42 {RATIO}
LYMPHOCYTES # BLD AUTO: 2.12 10*3/MM3 (ref 0.7–3.1)
LYMPHOCYTES NFR BLD AUTO: 31.3 % (ref 19.6–45.3)
MCH RBC QN AUTO: 29.5 PG (ref 26.6–33)
MCHC RBC AUTO-ENTMCNC: 34.1 G/DL (ref 31.5–35.7)
MCV RBC AUTO: 86.7 FL (ref 79–97)
MONOCYTES # BLD AUTO: 0.52 10*3/MM3 (ref 0.1–0.9)
MONOCYTES NFR BLD AUTO: 7.7 % (ref 5–12)
NEUTROPHILS NFR BLD AUTO: 3.96 10*3/MM3 (ref 1.7–7)
NEUTROPHILS NFR BLD AUTO: 58.6 % (ref 42.7–76)
NRBC BLD AUTO-RTO: 0 /100 WBC (ref 0–0.2)
PLATELET # BLD AUTO: 289 10*3/MM3 (ref 140–450)
PMV BLD AUTO: 11.2 FL (ref 6–12)
POTASSIUM SERPL-SCNC: 4.4 MMOL/L (ref 3.5–5.2)
PROT SERPL-MCNC: 7.8 G/DL (ref 6–8.5)
RBC # BLD AUTO: 5.32 10*6/MM3 (ref 4.14–5.8)
SODIUM SERPL-SCNC: 140 MMOL/L (ref 136–145)
TRIGL SERPL-MCNC: 125 MG/DL (ref 0–150)
TSH SERPL DL<=0.05 MIU/L-ACNC: 1.37 UIU/ML (ref 0.27–4.2)
VLDLC SERPL-MCNC: 23 MG/DL (ref 5–40)
WBC NRBC COR # BLD: 6.77 10*3/MM3 (ref 3.4–10.8)

## 2022-05-03 NOTE — PROGRESS NOTES
Electrolytes look good. Cholesterol is elevated. I know he doesn't want to try a statin. Zetia is not a statin and can help lower cholesterol. Is he willing to try it?

## 2022-06-01 ENCOUNTER — APPOINTMENT (OUTPATIENT)
Dept: CARDIOLOGY | Facility: HOSPITAL | Age: 47
End: 2022-06-01

## 2022-06-06 ENCOUNTER — APPOINTMENT (OUTPATIENT)
Dept: CARDIOLOGY | Facility: HOSPITAL | Age: 47
End: 2022-06-06

## 2022-06-06 DIAGNOSIS — J06.9 ACUTE URI: Primary | ICD-10-CM

## 2022-06-06 RX ORDER — AMOXICILLIN AND CLAVULANATE POTASSIUM 875; 125 MG/1; MG/1
1 TABLET, FILM COATED ORAL 2 TIMES DAILY
Qty: 20 TABLET | Refills: 0 | Status: SHIPPED | OUTPATIENT
Start: 2022-06-06 | End: 2022-06-16

## 2022-06-16 ENCOUNTER — APPOINTMENT (OUTPATIENT)
Dept: CARDIOLOGY | Facility: HOSPITAL | Age: 47
End: 2022-06-16

## 2022-07-01 ENCOUNTER — HOSPITAL ENCOUNTER (OUTPATIENT)
Dept: CARDIOLOGY | Facility: HOSPITAL | Age: 47
Discharge: HOME | End: 2022-07-01
Admitting: NURSE PRACTITIONER

## 2022-07-01 DIAGNOSIS — I73.9 INTERMITTENT CLAUDICATION: ICD-10-CM

## 2022-07-01 PROCEDURE — 93923 UPR/LXTR ART STDY 3+ LVLS: CPT

## 2022-07-01 PROCEDURE — 93923 UPR/LXTR ART STDY 3+ LVLS: CPT | Performed by: RADIOLOGY

## 2022-07-05 NOTE — PROGRESS NOTES
Ultrasound of blood flow in the legs reports as showing good blood flow with no significant blockage.

## 2022-07-06 ENCOUNTER — OFFICE VISIT (OUTPATIENT)
Dept: FAMILY MEDICINE CLINIC | Facility: CLINIC | Age: 47
End: 2022-07-06

## 2022-07-06 VITALS
SYSTOLIC BLOOD PRESSURE: 112 MMHG | HEIGHT: 73 IN | RESPIRATION RATE: 18 BRPM | WEIGHT: 220 LBS | DIASTOLIC BLOOD PRESSURE: 70 MMHG | HEART RATE: 72 BPM | BODY MASS INDEX: 29.16 KG/M2 | TEMPERATURE: 97.8 F | OXYGEN SATURATION: 96 %

## 2022-07-06 DIAGNOSIS — J30.9 ALLERGIC RHINITIS, UNSPECIFIED SEASONALITY, UNSPECIFIED TRIGGER: ICD-10-CM

## 2022-07-06 DIAGNOSIS — M79.605 PAIN IN BOTH LOWER EXTREMITIES: Primary | ICD-10-CM

## 2022-07-06 DIAGNOSIS — M79.604 PAIN IN BOTH LOWER EXTREMITIES: Primary | ICD-10-CM

## 2022-07-06 PROCEDURE — 99213 OFFICE O/P EST LOW 20 MIN: CPT | Performed by: NURSE PRACTITIONER

## 2022-07-06 RX ORDER — METHOCARBAMOL 500 MG/1
500 TABLET, FILM COATED ORAL 4 TIMES DAILY
Qty: 60 TABLET | Refills: 0 | Status: SHIPPED | OUTPATIENT
Start: 2022-07-06 | End: 2022-09-26

## 2022-07-06 RX ORDER — FEXOFENADINE HCL 180 MG/1
180 TABLET ORAL DAILY
Qty: 30 TABLET | Refills: 5 | Status: SHIPPED | OUTPATIENT
Start: 2022-07-06 | End: 2023-01-17 | Stop reason: SDUPTHER

## 2022-07-06 NOTE — PROGRESS NOTES
"Subjective   Devan Alarcon is a 46 y.o. male.     Chief Complaint   Patient presents with   • Leg Pain       He presents for follow up of pain in both his legs. He states the flexeril and requip are helping but they are making him very sleepy. He has an appointment with pain management on the 13th. He has not tried any other muscle relaxers in many years, since he was a teenager. He states if he doesn't take the flexeril and requip, it feels like his legs have a mind of their own.        The following portions of the patient's history were reviewed and updated as appropriate: allergies, current medications, past family history, past medical history, past social history, past surgical history and problem list.    Review of Systems   Constitutional: Negative for fever and unexpected weight change.   HENT: Negative for ear pain, rhinorrhea, sore throat and trouble swallowing.    Eyes: Negative for visual disturbance.   Respiratory: Negative for cough, shortness of breath and wheezing.    Cardiovascular: Negative for chest pain and palpitations.   Gastrointestinal: Negative for abdominal pain, blood in stool, constipation, diarrhea, nausea and vomiting.   Genitourinary: Negative for dysuria.   Musculoskeletal: Positive for arthralgias and myalgias.   Skin: Negative for color change.   Allergic/Immunologic: Negative for environmental allergies.   Neurological: Negative for dizziness.   Hematological: Negative for adenopathy.   Psychiatric/Behavioral: Negative for sleep disturbance and suicidal ideas. The patient is not nervous/anxious.        Objective     /70 (BP Location: Left arm, Patient Position: Sitting, Cuff Size: Adult)   Pulse 72   Temp 97.8 °F (36.6 °C) (Temporal)   Resp 18   Ht 185.4 cm (72.99\")   Wt 99.8 kg (220 lb)   SpO2 96%   BMI 29.03 kg/m²     Physical Exam  Vitals reviewed.   Constitutional:       General: He is not in acute distress.     Appearance: He is well-developed. He is not " diaphoretic.   HENT:      Head: Normocephalic and atraumatic.   Cardiovascular:      Rate and Rhythm: Normal rate and regular rhythm.      Heart sounds: Normal heart sounds. No murmur heard.    No friction rub. No gallop.   Pulmonary:      Effort: Pulmonary effort is normal. No respiratory distress.      Breath sounds: Normal breath sounds.   Abdominal:      General: Bowel sounds are normal. There is no distension.      Palpations: Abdomen is soft.      Tenderness: There is no abdominal tenderness.   Skin:     General: Skin is warm and dry.   Neurological:      Mental Status: He is alert and oriented to person, place, and time.   Psychiatric:         Behavior: Behavior normal.         Thought Content: Thought content normal.         Judgment: Judgment normal.         Current Outpatient Medications   Medication Sig Dispense Refill   • albuterol sulfate  (90 Base) MCG/ACT inhaler Inhale 2 puffs Every 4 (Four) Hours As Needed for Wheezing. 18 g 2   • amitriptyline (ELAVIL) 10 MG tablet Take 1 tablet by mouth Every Night. 30 tablet 5   • Cyanocobalamin 1000 MCG/ML kit Inject 1,000 mcg as directed 1 (One) Time Per Week. 4 kit 2   • dexlansoprazole (Dexilant) 60 MG capsule Take 1 capsule by mouth Daily. 30 capsule 5   • dicyclomine (BENTYL) 20 MG tablet Take 1 tablet by mouth 2 (Two) Times a Day. 60 tablet 11   • ibuprofen (ADVIL,MOTRIN) 800 MG tablet TAKE ONE TABLET BY MOUTH THREE TIMES A DAY FOR PAIN OR INFLAMMATION     • ondansetron ODT (ZOFRAN-ODT) 8 MG disintegrating tablet Take 1 tablet by mouth Every 6 (Six) Hours As Needed for Nausea or Vomiting. 42 tablet 5   • rOPINIRole (REQUIP) 1 MG tablet Take 1 tablet by mouth Every Night. Take 1 hour before bedtime. 30 tablet 5   • fexofenadine (Allegra Allergy) 180 MG tablet Take 1 tablet by mouth Daily. 30 tablet 5   • methocarbamol (Robaxin) 500 MG tablet Take 1 tablet by mouth 4 (Four) Times a Day. 60 tablet 0     Current Facility-Administered Medications    Medication Dose Route Frequency Provider Last Rate Last Admin   • cyanocobalamin injection 1,000 mcg  1,000 mcg Intramuscular Q28 Days Macey Brewer APRN   1,000 mcg at 05/02/22 1023            Assessment & Plan     Problem List Items Addressed This Visit    None     Visit Diagnoses     Pain in both lower extremities    -  Primary    Relevant Medications    methocarbamol (Robaxin) 500 MG tablet    Allergic rhinitis, unspecified seasonality, unspecified trigger        Relevant Medications    fexofenadine (Allegra Allergy) 180 MG tablet            ICD-10-CM ICD-9-CM   1. Pain in both lower extremities  M79.604 729.5    M79.605    2. Allergic rhinitis, unspecified seasonality, unspecified trigger  J30.9 477.9       Plan: Ultrasound of arteries in legs reports as not showing any blockage. Keep appointment with pain management. Change flexeril to robaxin to prevent sleepiness. Change zyrtec to allegra to prevent sleepiness. Follow up in 6 months and as needed.     @Body mass index is 29.03 kg/m².              Understands disease processes and need for medications.  Understands reasons for urgent and emergent care.  Patient (& family) verbalized agreement for treatment plan.   Emotional support and active listening provided.  Patient provided time to verbalize feelings.           BMI is >= 25 and <30. (Overweight) The following options were offered after discussion;: weight loss educational material (shared in after visit summary)      This document has been electronically signed by TONO Butts   July 6, 2022 11:55 EDT

## 2022-08-26 ENCOUNTER — TELEPHONE (OUTPATIENT)
Dept: GASTROENTEROLOGY | Facility: CLINIC | Age: 47
End: 2022-08-26

## 2022-08-26 DIAGNOSIS — K57.92 DIVERTICULITIS: Primary | ICD-10-CM

## 2022-08-26 RX ORDER — AMOXICILLIN AND CLAVULANATE POTASSIUM 875; 125 MG/1; MG/1
1 TABLET, FILM COATED ORAL 2 TIMES DAILY
Qty: 20 TABLET | Refills: 0 | Status: SHIPPED | OUTPATIENT
Start: 2022-08-26 | End: 2022-09-05

## 2022-08-26 NOTE — TELEPHONE ENCOUNTER
Patients girlfriend called stating that the patient is having a flair up of Diverticulitis and wants to know if something can be called in and to let you know that Cipro bothers him

## 2022-09-23 ENCOUNTER — TELEPHONE (OUTPATIENT)
Dept: FAMILY MEDICINE CLINIC | Facility: CLINIC | Age: 47
End: 2022-09-23

## 2022-09-23 DIAGNOSIS — M79.604 PAIN IN BOTH LOWER EXTREMITIES: ICD-10-CM

## 2022-09-23 DIAGNOSIS — M79.605 PAIN IN BOTH LOWER EXTREMITIES: ICD-10-CM

## 2022-09-23 RX ORDER — CYCLOBENZAPRINE HCL 5 MG
TABLET ORAL
Qty: 90 TABLET | Refills: 1 | Status: SHIPPED | OUTPATIENT
Start: 2022-09-23 | End: 2022-09-26 | Stop reason: SDUPTHER

## 2022-09-26 DIAGNOSIS — M79.604 PAIN IN BOTH LOWER EXTREMITIES: ICD-10-CM

## 2022-09-26 DIAGNOSIS — M79.605 PAIN IN BOTH LOWER EXTREMITIES: ICD-10-CM

## 2022-09-26 RX ORDER — CYCLOBENZAPRINE HCL 5 MG
5 TABLET ORAL 3 TIMES DAILY PRN
Qty: 90 TABLET | Refills: 1 | Status: SHIPPED | OUTPATIENT
Start: 2022-09-26 | End: 2023-01-17 | Stop reason: SDUPTHER

## 2022-10-03 DIAGNOSIS — J32.9 SINUSITIS, UNSPECIFIED CHRONICITY, UNSPECIFIED LOCATION: Primary | ICD-10-CM

## 2022-10-03 RX ORDER — CETIRIZINE HYDROCHLORIDE 10 MG/1
10 TABLET ORAL DAILY
COMMUNITY
End: 2022-10-03 | Stop reason: SDUPTHER

## 2022-10-03 RX ORDER — CETIRIZINE HYDROCHLORIDE 10 MG/1
10 TABLET ORAL DAILY
Qty: 30 TABLET | Refills: 3 | Status: SHIPPED | OUTPATIENT
Start: 2022-10-03

## 2022-12-12 ENCOUNTER — TELEMEDICINE (OUTPATIENT)
Dept: FAMILY MEDICINE CLINIC | Facility: CLINIC | Age: 47
End: 2022-12-12

## 2022-12-12 DIAGNOSIS — J06.9 ACUTE URI: Primary | ICD-10-CM

## 2022-12-12 PROCEDURE — 99213 OFFICE O/P EST LOW 20 MIN: CPT | Performed by: NURSE PRACTITIONER

## 2022-12-12 RX ORDER — METHYLPREDNISOLONE 4 MG/1
TABLET ORAL
Qty: 21 TABLET | Refills: 0 | Status: SHIPPED | OUTPATIENT
Start: 2022-12-12

## 2022-12-12 RX ORDER — CEFDINIR 300 MG/1
300 CAPSULE ORAL 2 TIMES DAILY
Qty: 20 CAPSULE | Refills: 0 | Status: SHIPPED | OUTPATIENT
Start: 2022-12-12

## 2022-12-12 NOTE — PROGRESS NOTES
Subjective   Devan Alarcon is a 47 y.o. male.     Chief Complaint   Patient presents with   • URI       History of Present Illness  He presents via video visit with c/o sinus pressure for the past few days and he feels like it is going into his lungs. Covid test is negative. He has taken theraflu and tylenol cold. He is coughing up white phlegm with a little yellow. He denies fever and chills.        The following portions of the patient's history were reviewed and updated as appropriate: allergies, current medications, past family history, past medical history, past social history, past surgical history and problem list.    Review of Systems   Constitutional: Negative for fever and unexpected weight change.   HENT: Positive for congestion, sinus pressure and sneezing. Negative for ear pain, rhinorrhea, sore throat and trouble swallowing.    Eyes: Negative for visual disturbance.   Respiratory: Positive for cough. Negative for shortness of breath and wheezing.    Cardiovascular: Negative for chest pain and palpitations.   Gastrointestinal: Negative for abdominal pain, blood in stool, constipation, diarrhea, nausea and vomiting.   Genitourinary: Negative for dysuria and hematuria.   Allergic/Immunologic: Positive for environmental allergies.   Neurological: Negative for dizziness and headaches.   Psychiatric/Behavioral: Negative for sleep disturbance and suicidal ideas. The patient is not nervous/anxious.        Objective     There were no vitals taken for this visit.    Physical Exam  Constitutional:       General: He is not in acute distress.     Appearance: Normal appearance.   Pulmonary:      Effort: Pulmonary effort is normal. No respiratory distress.   Neurological:      General: No focal deficit present.      Mental Status: He is alert and oriented to person, place, and time.   Psychiatric:         Mood and Affect: Mood normal.         Behavior: Behavior normal.         Current Outpatient Medications    Medication Sig Dispense Refill   • cyclobenzaprine (FLEXERIL) 5 MG tablet Take 1 tablet by mouth 3 (Three) Times a Day As Needed for Muscle Spasms. 90 tablet 1   • albuterol sulfate  (90 Base) MCG/ACT inhaler Inhale 2 puffs Every 4 (Four) Hours As Needed for Wheezing. 18 g 2   • amitriptyline (ELAVIL) 10 MG tablet Take 1 tablet by mouth Every Night. 30 tablet 5   • cefdinir (OMNICEF) 300 MG capsule Take 1 capsule by mouth 2 (Two) Times a Day. 20 capsule 0   • cetirizine (zyrTEC) 10 MG tablet Take 1 tablet by mouth Daily. 30 tablet 3   • Cyanocobalamin 1000 MCG/ML kit Inject 1,000 mcg as directed 1 (One) Time Per Week. 4 kit 2   • dexlansoprazole (Dexilant) 60 MG capsule Take 1 capsule by mouth Daily. 30 capsule 5   • dicyclomine (BENTYL) 20 MG tablet Take 1 tablet by mouth 2 (Two) Times a Day. 60 tablet 11   • fexofenadine (Allegra Allergy) 180 MG tablet Take 1 tablet by mouth Daily. 30 tablet 5   • ibuprofen (ADVIL,MOTRIN) 800 MG tablet TAKE ONE TABLET BY MOUTH THREE TIMES A DAY FOR PAIN OR INFLAMMATION     • methylPREDNISolone (MEDROL) 4 MG dose pack Take as directed on package instructions. 21 tablet 0   • ondansetron ODT (ZOFRAN-ODT) 8 MG disintegrating tablet Take 1 tablet by mouth Every 6 (Six) Hours As Needed for Nausea or Vomiting. 42 tablet 5   • rOPINIRole (REQUIP) 1 MG tablet Take 1 tablet by mouth Every Night. Take 1 hour before bedtime. 30 tablet 5     Current Facility-Administered Medications   Medication Dose Route Frequency Provider Last Rate Last Admin   • cyanocobalamin injection 1,000 mcg  1,000 mcg Intramuscular Q28 Days Macey Brewer APRN   1,000 mcg at 05/02/22 1023            Assessment & Plan     Problem List Items Addressed This Visit    None  Visit Diagnoses     Acute URI    -  Primary    Relevant Medications    cefdinir (OMNICEF) 300 MG capsule    methylPREDNISolone (MEDROL) 4 MG dose pack            ICD-10-CM ICD-9-CM   1. Acute URI  J06.9 465.9     Plan: Cefdinir and  medrol ordered ordered for acute uri. Keep scheduled follow up and follow up as needed.     @There is no height or weight on file to calculate BMI.     The use of a video visit has been reviewed with the patient and verbal informed consent has been obtained.           Understands disease processes and need for medications.  Understands reasons for urgent and emergent care.  Patient (& family) verbalized agreement for treatment plan.   Emotional support and active listening provided.  Patient provided time to verbalize feelings.               This document has been electronically signed by TONO Butts   December 12, 2022 15:29 EST

## 2023-01-16 ENCOUNTER — TELEPHONE (OUTPATIENT)
Dept: FAMILY MEDICINE CLINIC | Facility: CLINIC | Age: 48
End: 2023-01-16
Payer: COMMERCIAL

## 2023-01-17 DIAGNOSIS — K57.92 DIVERTICULITIS: ICD-10-CM

## 2023-01-17 DIAGNOSIS — J30.9 ALLERGIC RHINITIS, UNSPECIFIED SEASONALITY, UNSPECIFIED TRIGGER: ICD-10-CM

## 2023-01-17 DIAGNOSIS — M79.605 PAIN IN BOTH LOWER EXTREMITIES: ICD-10-CM

## 2023-01-17 DIAGNOSIS — M79.604 PAIN IN BOTH LOWER EXTREMITIES: ICD-10-CM

## 2023-01-19 RX ORDER — CYCLOBENZAPRINE HCL 5 MG
5 TABLET ORAL 3 TIMES DAILY PRN
Qty: 90 TABLET | Refills: 1 | Status: SHIPPED | OUTPATIENT
Start: 2023-01-19

## 2023-01-19 RX ORDER — DICYCLOMINE HCL 20 MG
20 TABLET ORAL 2 TIMES DAILY
Qty: 60 TABLET | Refills: 11 | Status: SHIPPED | OUTPATIENT
Start: 2023-01-19

## 2023-01-19 RX ORDER — FEXOFENADINE HCL 180 MG/1
180 TABLET ORAL DAILY
Qty: 30 TABLET | Refills: 5 | Status: SHIPPED | OUTPATIENT
Start: 2023-01-19

## 2023-06-07 DIAGNOSIS — M79.605 PAIN IN BOTH LOWER EXTREMITIES: ICD-10-CM

## 2023-06-07 DIAGNOSIS — M79.604 PAIN IN BOTH LOWER EXTREMITIES: ICD-10-CM

## 2023-06-08 RX ORDER — CYCLOBENZAPRINE HCL 5 MG
TABLET ORAL
Qty: 90 TABLET | Refills: 1 | Status: SHIPPED | OUTPATIENT
Start: 2023-06-08

## 2023-12-14 DIAGNOSIS — M79.604 PAIN IN BOTH LOWER EXTREMITIES: ICD-10-CM

## 2023-12-14 DIAGNOSIS — M79.605 PAIN IN BOTH LOWER EXTREMITIES: ICD-10-CM

## 2023-12-14 RX ORDER — CYCLOBENZAPRINE HCL 5 MG
TABLET ORAL
Qty: 90 TABLET | Refills: 1 | Status: SHIPPED | OUTPATIENT
Start: 2023-12-14

## 2024-02-29 ENCOUNTER — OFFICE VISIT (OUTPATIENT)
Dept: FAMILY MEDICINE CLINIC | Facility: CLINIC | Age: 49
End: 2024-02-29
Payer: COMMERCIAL

## 2024-02-29 VITALS
WEIGHT: 203 LBS | SYSTOLIC BLOOD PRESSURE: 130 MMHG | RESPIRATION RATE: 18 BRPM | DIASTOLIC BLOOD PRESSURE: 84 MMHG | BODY MASS INDEX: 26.05 KG/M2 | HEIGHT: 74 IN | TEMPERATURE: 98.2 F | OXYGEN SATURATION: 100 % | HEART RATE: 82 BPM

## 2024-02-29 DIAGNOSIS — K92.1 BLOOD IN STOOL: Chronic | ICD-10-CM

## 2024-02-29 DIAGNOSIS — I73.9 PVD (PERIPHERAL VASCULAR DISEASE): Chronic | ICD-10-CM

## 2024-02-29 DIAGNOSIS — E78.49 OTHER HYPERLIPIDEMIA: Primary | Chronic | ICD-10-CM

## 2024-02-29 DIAGNOSIS — Z85.01 PERSONAL HISTORY OF ESOPHAGEAL CANCER: Chronic | ICD-10-CM

## 2024-02-29 DIAGNOSIS — G25.81 RESTLESS LEG SYNDROME: Chronic | ICD-10-CM

## 2024-02-29 DIAGNOSIS — K57.92 DIVERTICULITIS: Chronic | ICD-10-CM

## 2024-02-29 DIAGNOSIS — M79.604 PAIN IN BOTH LOWER EXTREMITIES: Chronic | ICD-10-CM

## 2024-02-29 DIAGNOSIS — M79.605 PAIN IN BOTH LOWER EXTREMITIES: Chronic | ICD-10-CM

## 2024-02-29 LAB
ALBUMIN SERPL-MCNC: 4.3 G/DL (ref 3.5–5.2)
ALBUMIN/GLOB SERPL: 1.3 G/DL
ALP SERPL-CCNC: 98 U/L (ref 39–117)
ALT SERPL W P-5'-P-CCNC: 19 U/L (ref 1–41)
ANION GAP SERPL CALCULATED.3IONS-SCNC: 9 MMOL/L (ref 5–15)
AST SERPL-CCNC: 16 U/L (ref 1–40)
BASOPHILS # BLD AUTO: 0.03 10*3/MM3 (ref 0–0.2)
BASOPHILS NFR BLD AUTO: 0.4 % (ref 0–1.5)
BILIRUB SERPL-MCNC: 0.5 MG/DL (ref 0–1.2)
BUN SERPL-MCNC: 11 MG/DL (ref 6–20)
BUN/CREAT SERPL: 11.6 (ref 7–25)
CALCIUM SPEC-SCNC: 9.5 MG/DL (ref 8.6–10.5)
CHLORIDE SERPL-SCNC: 105 MMOL/L (ref 98–107)
CHOLEST SERPL-MCNC: 184 MG/DL (ref 0–200)
CO2 SERPL-SCNC: 29 MMOL/L (ref 22–29)
CREAT SERPL-MCNC: 0.95 MG/DL (ref 0.76–1.27)
DEPRECATED RDW RBC AUTO: 45.2 FL (ref 37–54)
EGFRCR SERPLBLD CKD-EPI 2021: 98.1 ML/MIN/1.73
EOSINOPHIL # BLD AUTO: 0.07 10*3/MM3 (ref 0–0.4)
EOSINOPHIL NFR BLD AUTO: 0.9 % (ref 0.3–6.2)
ERYTHROCYTE [DISTWIDTH] IN BLOOD BY AUTOMATED COUNT: 13.9 % (ref 12.3–15.4)
GLOBULIN UR ELPH-MCNC: 3.4 GM/DL
GLUCOSE SERPL-MCNC: 92 MG/DL (ref 65–99)
HCT VFR BLD AUTO: 48.5 % (ref 37.5–51)
HDLC SERPL-MCNC: 44 MG/DL (ref 40–60)
HGB BLD-MCNC: 15.8 G/DL (ref 13–17.7)
IMM GRANULOCYTES # BLD AUTO: 0.03 10*3/MM3 (ref 0–0.05)
IMM GRANULOCYTES NFR BLD AUTO: 0.4 % (ref 0–0.5)
LDLC SERPL CALC-MCNC: 119 MG/DL (ref 0–100)
LDLC/HDLC SERPL: 2.65 {RATIO}
LYMPHOCYTES # BLD AUTO: 1.91 10*3/MM3 (ref 0.7–3.1)
LYMPHOCYTES NFR BLD AUTO: 23.6 % (ref 19.6–45.3)
MCH RBC QN AUTO: 29 PG (ref 26.6–33)
MCHC RBC AUTO-ENTMCNC: 32.6 G/DL (ref 31.5–35.7)
MCV RBC AUTO: 89.2 FL (ref 79–97)
MONOCYTES # BLD AUTO: 0.6 10*3/MM3 (ref 0.1–0.9)
MONOCYTES NFR BLD AUTO: 7.4 % (ref 5–12)
NEUTROPHILS NFR BLD AUTO: 5.47 10*3/MM3 (ref 1.7–7)
NEUTROPHILS NFR BLD AUTO: 67.3 % (ref 42.7–76)
NRBC BLD AUTO-RTO: 0 /100 WBC (ref 0–0.2)
PLATELET # BLD AUTO: 282 10*3/MM3 (ref 140–450)
PMV BLD AUTO: 11 FL (ref 6–12)
POTASSIUM SERPL-SCNC: 4.4 MMOL/L (ref 3.5–5.2)
PROT SERPL-MCNC: 7.7 G/DL (ref 6–8.5)
RBC # BLD AUTO: 5.44 10*6/MM3 (ref 4.14–5.8)
SODIUM SERPL-SCNC: 143 MMOL/L (ref 136–145)
TRIGL SERPL-MCNC: 117 MG/DL (ref 0–150)
VLDLC SERPL-MCNC: 21 MG/DL (ref 5–40)
WBC NRBC COR # BLD AUTO: 8.11 10*3/MM3 (ref 3.4–10.8)

## 2024-02-29 PROCEDURE — 85025 COMPLETE CBC W/AUTO DIFF WBC: CPT | Performed by: NURSE PRACTITIONER

## 2024-02-29 PROCEDURE — 80053 COMPREHEN METABOLIC PANEL: CPT | Performed by: NURSE PRACTITIONER

## 2024-02-29 PROCEDURE — 80061 LIPID PANEL: CPT | Performed by: NURSE PRACTITIONER

## 2024-02-29 RX ORDER — MIRTAZAPINE 7.5 MG/1
7.5 TABLET, FILM COATED ORAL NIGHTLY
Qty: 30 TABLET | Refills: 0 | Status: SHIPPED | OUTPATIENT
Start: 2024-02-29

## 2024-02-29 RX ORDER — CYCLOBENZAPRINE HCL 5 MG
5 TABLET ORAL 3 TIMES DAILY PRN
Qty: 90 TABLET | Refills: 5 | Status: SHIPPED | OUTPATIENT
Start: 2024-02-29

## 2024-02-29 RX ORDER — DICYCLOMINE HCL 20 MG
20 TABLET ORAL 2 TIMES DAILY
Qty: 60 TABLET | Refills: 5 | Status: SHIPPED | OUTPATIENT
Start: 2024-02-29

## 2024-02-29 NOTE — PROGRESS NOTES
Venipuncture Blood Specimen Collection  Venipuncture performed in right arm by Lila Arambula MA with good hemostasis. Patient tolerated the procedure well without complications.   02/29/24   Lila Arambula MA

## 2024-02-29 NOTE — PROGRESS NOTES
"Subjective   Devan Alarcon is a 48 y.o. male.     Chief Complaint   Patient presents with    Hyperlipidemia    Restless Legs Syndrome    dumping syndrome       History of Present Illness  He presents for follow up of mixed hyperlipidemia, restless leg syndrome, and dumping syndrome. He doesn't take statins because they made his legs hurt in the past. He states the cyclobenzaprine helps his leg cramps at night. He tries to stay hydrated. He wears compression socks. He states he stretches his calves all day. He has taken potassium and magnesium. He has tried requip in the past for rls. He uses bentyl to help with dumping syndrome. He c/o a large varicose vein in his left leg. He states he had blood in the stool a couple weeks ago. He has h/o esophageal cancer.        The following portions of the patient's history were reviewed and updated as appropriate: allergies, current medications, past family history, past medical history, past social history, past surgical history and problem list.    Review of Systems   Constitutional:  Negative for fever and unexpected weight change.   Respiratory:  Negative for cough, shortness of breath and wheezing.    Cardiovascular:  Negative for chest pain and palpitations.   Gastrointestinal:  Positive for blood in stool (a couple weeks ago) and diarrhea (chronic). Negative for abdominal pain, constipation, nausea and vomiting.   Genitourinary:  Negative for dysuria and hematuria.   Musculoskeletal:  Positive for myalgias. Negative for arthralgias.   Allergic/Immunologic: Positive for environmental allergies.   Neurological:  Negative for dizziness and headaches.   Psychiatric/Behavioral:  Negative for suicidal ideas. The patient is not nervous/anxious.        Objective     /84 (BP Location: Right arm, Patient Position: Sitting, Cuff Size: Large Adult)   Pulse 82   Temp 98.2 °F (36.8 °C) (Temporal)   Resp 18   Ht 188 cm (74\")   Wt 92.1 kg (203 lb)   SpO2 100%   BMI 26.06 " kg/m²     Physical Exam  Vitals reviewed.   Constitutional:       General: He is not in acute distress.     Appearance: He is well-developed. He is not diaphoretic.   HENT:      Head: Normocephalic and atraumatic.   Cardiovascular:      Rate and Rhythm: Normal rate and regular rhythm.      Pulses:           Posterior tibial pulses are 2+ on the right side and 2+ on the left side.      Heart sounds: Normal heart sounds. No murmur heard.     No friction rub. No gallop.      Comments: Large varicosity left proximal knee.  Pulmonary:      Effort: Pulmonary effort is normal. No respiratory distress.      Breath sounds: Normal breath sounds.   Abdominal:      General: Bowel sounds are normal. There is no distension.      Palpations: Abdomen is soft.      Tenderness: There is no abdominal tenderness.   Skin:     General: Skin is warm and dry.   Neurological:      Mental Status: He is alert and oriented to person, place, and time.   Psychiatric:         Behavior: Behavior normal.         Thought Content: Thought content normal.         Judgment: Judgment normal.         Current Outpatient Medications   Medication Sig Dispense Refill    albuterol sulfate  (90 Base) MCG/ACT inhaler Inhale 2 puffs Every 4 (Four) Hours As Needed for Wheezing. 18 g 2    Cyanocobalamin 1000 MCG/ML kit Inject 1,000 mcg as directed 1 (One) Time Per Week. 4 kit 2    cyclobenzaprine (FLEXERIL) 5 MG tablet Take 1 tablet by mouth 3 (Three) Times a Day As Needed for Muscle Spasms. 90 tablet 5    dicyclomine (BENTYL) 20 MG tablet Take 1 tablet by mouth 2 (Two) Times a Day. 60 tablet 5    fexofenadine (Allegra Allergy) 180 MG tablet Take 1 tablet by mouth Daily. 30 tablet 5    ibuprofen (ADVIL,MOTRIN) 800 MG tablet TAKE ONE TABLET BY MOUTH THREE TIMES A DAY FOR PAIN OR INFLAMMATION      ondansetron ODT (ZOFRAN-ODT) 8 MG disintegrating tablet Take 1 tablet by mouth Every 6 (Six) Hours As Needed for Nausea or Vomiting. 42 tablet 5    mirtazapine  (REMERON) 7.5 MG tablet Take 1 tablet by mouth Every Night. 30 tablet 0     Current Facility-Administered Medications   Medication Dose Route Frequency Provider Last Rate Last Admin    cyanocobalamin injection 1,000 mcg  1,000 mcg Intramuscular Q28 Days Macey Brewer APRN   1,000 mcg at 05/02/22 1023            Assessment & Plan     Problem List Items Addressed This Visit       Hyperlipidemia - Primary    Relevant Orders    CBC & Differential    Comprehensive Metabolic Panel    Lipid Panel    Restless leg syndrome    Relevant Medications    mirtazapine (REMERON) 7.5 MG tablet    Personal history of esophageal cancer    Relevant Orders    Ambulatory Referral to Gastroenterology    Diverticulitis    Overview     Added automatically from request for surgery [id number]         Relevant Medications    dicyclomine (BENTYL) 20 MG tablet    Other Relevant Orders    Ambulatory Referral to Gastroenterology     Other Visit Diagnoses       Pain in both lower extremities        Relevant Medications    cyclobenzaprine (FLEXERIL) 5 MG tablet    PVD (peripheral vascular disease)        Relevant Orders    Ambulatory Referral to Vascular Surgery              ICD-10-CM ICD-9-CM   1. Other hyperlipidemia  E78.49 272.4   2. Pain in both lower extremities  M79.604 729.5    M79.605    3. Diverticulitis  K57.92 562.11   4. Restless leg syndrome  G25.81 333.94   5. PVD (peripheral vascular disease)  I73.9 443.9   6. Personal history of esophageal cancer  Z85.01 V10.03     Plan: Get labs today. Refer to GI for blood in stool. Add mirtazapine for restless leg syndrome. Check iron if hemoglobin is low. Refer to vascular for varicosity pain. Follow up pending results.     @Body mass index is 26.06 kg/m².           Understands disease processes and need for medications.  Understands reasons for urgent and emergent care.  Patient (& family) verbalized agreement for treatment plan.   Emotional support and active listening provided.  Patient  provided time to verbalize feelings.           BMI is >= 25 and <30. (Overweight) The following options were offered after discussion;: information on healthy weight added to patient's after visit summary       This document has been electronically signed by TONO Butts   February 29, 2024 13:26 EST

## 2024-03-01 NOTE — PROGRESS NOTES
Cholesterol is a little high. Cut back on fried and fatty food. Blood counts and electrolytes are normal.

## 2024-05-09 ENCOUNTER — OFFICE VISIT (OUTPATIENT)
Dept: FAMILY MEDICINE CLINIC | Facility: CLINIC | Age: 49
End: 2024-05-09
Payer: COMMERCIAL

## 2024-05-09 VITALS
OXYGEN SATURATION: 98 % | TEMPERATURE: 97.8 F | SYSTOLIC BLOOD PRESSURE: 136 MMHG | DIASTOLIC BLOOD PRESSURE: 80 MMHG | HEART RATE: 79 BPM | HEIGHT: 74 IN | RESPIRATION RATE: 18 BRPM | BODY MASS INDEX: 26.18 KG/M2 | WEIGHT: 204 LBS

## 2024-05-09 DIAGNOSIS — G25.81 RESTLESS LEG SYNDROME: Primary | Chronic | ICD-10-CM

## 2024-05-09 DIAGNOSIS — R53.83 OTHER FATIGUE: ICD-10-CM

## 2024-05-09 LAB
ALBUMIN SERPL-MCNC: 3.9 G/DL (ref 3.5–5.2)
ALBUMIN/GLOB SERPL: 1.3 G/DL
ALP SERPL-CCNC: 93 U/L (ref 39–117)
ALT SERPL W P-5'-P-CCNC: 13 U/L (ref 1–41)
AMPHET+METHAMPHET UR QL: NEGATIVE
AMPHETAMINES UR QL: NEGATIVE
ANION GAP SERPL CALCULATED.3IONS-SCNC: 6.8 MMOL/L (ref 5–15)
AST SERPL-CCNC: 17 U/L (ref 1–40)
BARBITURATES UR QL SCN: NEGATIVE
BASOPHILS # BLD AUTO: 0.03 10*3/MM3 (ref 0–0.2)
BASOPHILS NFR BLD AUTO: 0.3 % (ref 0–1.5)
BENZODIAZ UR QL SCN: NEGATIVE
BILIRUB SERPL-MCNC: 0.5 MG/DL (ref 0–1.2)
BUN SERPL-MCNC: 11 MG/DL (ref 6–20)
BUN/CREAT SERPL: 12.4 (ref 7–25)
BUPRENORPHINE SERPL-MCNC: NEGATIVE NG/ML
CALCIUM SPEC-SCNC: 9 MG/DL (ref 8.6–10.5)
CANNABINOIDS SERPL QL: NEGATIVE
CHLORIDE SERPL-SCNC: 105 MMOL/L (ref 98–107)
CO2 SERPL-SCNC: 27.2 MMOL/L (ref 22–29)
COCAINE UR QL: NEGATIVE
CREAT SERPL-MCNC: 0.89 MG/DL (ref 0.76–1.27)
DEPRECATED RDW RBC AUTO: 41.9 FL (ref 37–54)
EGFRCR SERPLBLD CKD-EPI 2021: 105.1 ML/MIN/1.73
EOSINOPHIL # BLD AUTO: 0.16 10*3/MM3 (ref 0–0.4)
EOSINOPHIL NFR BLD AUTO: 1.6 % (ref 0.3–6.2)
ERYTHROCYTE [DISTWIDTH] IN BLOOD BY AUTOMATED COUNT: 13.2 % (ref 12.3–15.4)
FENTANYL UR-MCNC: NEGATIVE NG/ML
FERRITIN SERPL-MCNC: 47 NG/ML (ref 30–400)
GLOBULIN UR ELPH-MCNC: 3 GM/DL
GLUCOSE SERPL-MCNC: 84 MG/DL (ref 65–99)
HCT VFR BLD AUTO: 45.7 % (ref 37.5–51)
HGB BLD-MCNC: 14.9 G/DL (ref 13–17.7)
IMM GRANULOCYTES # BLD AUTO: 0.03 10*3/MM3 (ref 0–0.05)
IMM GRANULOCYTES NFR BLD AUTO: 0.3 % (ref 0–0.5)
IRON 24H UR-MRATE: 69 MCG/DL (ref 59–158)
IRON SATN MFR SERPL: 18 % (ref 20–50)
LYMPHOCYTES # BLD AUTO: 1.95 10*3/MM3 (ref 0.7–3.1)
LYMPHOCYTES NFR BLD AUTO: 20.1 % (ref 19.6–45.3)
MCH RBC QN AUTO: 28.5 PG (ref 26.6–33)
MCHC RBC AUTO-ENTMCNC: 32.6 G/DL (ref 31.5–35.7)
MCV RBC AUTO: 87.5 FL (ref 79–97)
METHADONE UR QL SCN: NEGATIVE
MONOCYTES # BLD AUTO: 0.68 10*3/MM3 (ref 0.1–0.9)
MONOCYTES NFR BLD AUTO: 7 % (ref 5–12)
NEUTROPHILS NFR BLD AUTO: 6.86 10*3/MM3 (ref 1.7–7)
NEUTROPHILS NFR BLD AUTO: 70.7 % (ref 42.7–76)
NRBC BLD AUTO-RTO: 0 /100 WBC (ref 0–0.2)
OPIATES UR QL: NEGATIVE
OXYCODONE UR QL SCN: NEGATIVE
PCP UR QL SCN: NEGATIVE
PLATELET # BLD AUTO: 295 10*3/MM3 (ref 140–450)
PMV BLD AUTO: 10.6 FL (ref 6–12)
POTASSIUM SERPL-SCNC: 4.3 MMOL/L (ref 3.5–5.2)
PROT SERPL-MCNC: 6.9 G/DL (ref 6–8.5)
RBC # BLD AUTO: 5.22 10*6/MM3 (ref 4.14–5.8)
SODIUM SERPL-SCNC: 139 MMOL/L (ref 136–145)
TIBC SERPL-MCNC: 387 MCG/DL (ref 298–536)
TRANSFERRIN SERPL-MCNC: 260 MG/DL (ref 200–360)
TRICYCLICS UR QL SCN: NEGATIVE
WBC NRBC COR # BLD AUTO: 9.71 10*3/MM3 (ref 3.4–10.8)

## 2024-05-09 PROCEDURE — 80053 COMPREHEN METABOLIC PANEL: CPT | Performed by: NURSE PRACTITIONER

## 2024-05-09 PROCEDURE — 99214 OFFICE O/P EST MOD 30 MIN: CPT | Performed by: NURSE PRACTITIONER

## 2024-05-09 PROCEDURE — 82728 ASSAY OF FERRITIN: CPT | Performed by: NURSE PRACTITIONER

## 2024-05-09 PROCEDURE — 85025 COMPLETE CBC W/AUTO DIFF WBC: CPT | Performed by: NURSE PRACTITIONER

## 2024-05-09 PROCEDURE — 80307 DRUG TEST PRSMV CHEM ANLYZR: CPT | Performed by: NURSE PRACTITIONER

## 2024-05-09 PROCEDURE — 84466 ASSAY OF TRANSFERRIN: CPT | Performed by: NURSE PRACTITIONER

## 2024-05-09 PROCEDURE — 83540 ASSAY OF IRON: CPT | Performed by: NURSE PRACTITIONER

## 2024-05-09 RX ORDER — GABAPENTIN 100 MG/1
100 CAPSULE ORAL NIGHTLY PRN
Qty: 30 CAPSULE | Refills: 0 | Status: SHIPPED | OUTPATIENT
Start: 2024-05-09

## 2024-05-13 ENCOUNTER — CLINICAL SUPPORT (OUTPATIENT)
Dept: FAMILY MEDICINE CLINIC | Facility: CLINIC | Age: 49
End: 2024-05-13
Payer: COMMERCIAL

## 2024-05-13 DIAGNOSIS — R53.83 OTHER FATIGUE: ICD-10-CM

## 2024-05-13 LAB
FSH SERPL-ACNC: 9.21 MIU/ML
LH SERPL-ACNC: 6.57 MIU/ML
TESTOST SERPL-MCNC: 831 NG/DL (ref 249–836)

## 2024-05-13 PROCEDURE — 83002 ASSAY OF GONADOTROPIN (LH): CPT | Performed by: NURSE PRACTITIONER

## 2024-05-13 PROCEDURE — 83001 ASSAY OF GONADOTROPIN (FSH): CPT | Performed by: NURSE PRACTITIONER

## 2024-05-13 PROCEDURE — 36415 COLL VENOUS BLD VENIPUNCTURE: CPT | Performed by: NURSE PRACTITIONER

## 2024-05-13 PROCEDURE — 84403 ASSAY OF TOTAL TESTOSTERONE: CPT | Performed by: NURSE PRACTITIONER

## 2024-05-13 NOTE — PROGRESS NOTES
Venipuncture Blood Specimen Collection  Venipuncture performed in right arm by Lila Arambula MA with good hemostasis. Patient tolerated the procedure well without complications.   05/13/24   Lila Arambula MA

## 2024-06-06 ENCOUNTER — OFFICE VISIT (OUTPATIENT)
Dept: FAMILY MEDICINE CLINIC | Facility: CLINIC | Age: 49
End: 2024-06-06
Payer: COMMERCIAL

## 2024-06-06 VITALS
OXYGEN SATURATION: 98 % | HEIGHT: 74 IN | HEART RATE: 79 BPM | WEIGHT: 207.6 LBS | BODY MASS INDEX: 26.64 KG/M2 | SYSTOLIC BLOOD PRESSURE: 130 MMHG | DIASTOLIC BLOOD PRESSURE: 76 MMHG | TEMPERATURE: 98.3 F

## 2024-06-06 DIAGNOSIS — D50.9 IRON DEFICIENCY ANEMIA, UNSPECIFIED IRON DEFICIENCY ANEMIA TYPE: Chronic | ICD-10-CM

## 2024-06-06 DIAGNOSIS — G25.81 RESTLESS LEG SYNDROME: Primary | Chronic | ICD-10-CM

## 2024-06-06 PROCEDURE — 99214 OFFICE O/P EST MOD 30 MIN: CPT | Performed by: NURSE PRACTITIONER

## 2024-06-06 RX ORDER — FERROUS GLUCONATE 324(38)MG
324 TABLET ORAL
Qty: 30 TABLET | Refills: 5 | Status: SHIPPED | OUTPATIENT
Start: 2024-06-06

## 2024-06-06 RX ORDER — GABAPENTIN 100 MG/1
100 CAPSULE ORAL NIGHTLY PRN
Qty: 30 CAPSULE | Refills: 2 | Status: SHIPPED | OUTPATIENT
Start: 2024-06-06

## 2024-06-06 NOTE — PROGRESS NOTES
"Subjective   Devan Alarcon is a 48 y.o. male.     Chief Complaint   Patient presents with    Follow-up     Pt states he is here on medication follow up.     Restless Legs Syndrome       History of Present Illness  He presents for follow-up of restless leg syndrome.  He states each started the gabapentin and is tolerating it well.  He states that helps him sleep through the night.  He states he was having to get up 5 times at night to walk because of his legs at night.  He states his legs do still feel sore in the morning.       The following portions of the patient's history were reviewed and updated as appropriate: allergies, current medications, past family history, past medical history, past social history, past surgical history and problem list.    Review of Systems   Constitutional:  Negative for fever.   Respiratory:  Negative for cough, shortness of breath and wheezing.    Cardiovascular:  Negative for chest pain and palpitations.   Gastrointestinal:  Positive for abdominal pain (chronic) and diarrhea (chronic). Negative for blood in stool, constipation, nausea and vomiting.   Genitourinary:  Negative for dysuria and hematuria.   Musculoskeletal:  Positive for arthralgias and myalgias.   Neurological:  Negative for dizziness.       Objective     /76 (BP Location: Left arm, Patient Position: Sitting, Cuff Size: Adult)   Pulse 79   Temp 98.3 °F (36.8 °C) (Oral)   Ht 188 cm (74.02\")   Wt 94.2 kg (207 lb 9.6 oz)   SpO2 98%   BMI 26.64 kg/m²     Physical Exam  Vitals reviewed.   Constitutional:       General: He is not in acute distress.     Appearance: He is well-developed. He is not diaphoretic.   HENT:      Head: Normocephalic and atraumatic.   Cardiovascular:      Rate and Rhythm: Normal rate and regular rhythm.      Heart sounds: Normal heart sounds. No murmur heard.     No friction rub. No gallop.   Pulmonary:      Effort: Pulmonary effort is normal. No respiratory distress.      Breath sounds: " Normal breath sounds.   Abdominal:      General: Bowel sounds are normal. There is no distension.      Palpations: Abdomen is soft.      Tenderness: There is no abdominal tenderness.   Skin:     General: Skin is warm and dry.   Neurological:      Mental Status: He is alert and oriented to person, place, and time.   Psychiatric:         Behavior: Behavior normal.         Thought Content: Thought content normal.         Judgment: Judgment normal.         Current Outpatient Medications   Medication Sig Dispense Refill    albuterol sulfate  (90 Base) MCG/ACT inhaler Inhale 2 puffs Every 4 (Four) Hours As Needed for Wheezing. 18 g 2    cyclobenzaprine (FLEXERIL) 5 MG tablet Take 1 tablet by mouth 3 (Three) Times a Day As Needed for Muscle Spasms. 90 tablet 5    dicyclomine (BENTYL) 20 MG tablet Take 1 tablet by mouth 2 (Two) Times a Day. 60 tablet 5    fexofenadine (Allegra Allergy) 180 MG tablet Take 1 tablet by mouth Daily. 30 tablet 5    gabapentin (NEURONTIN) 100 MG capsule Take 1 capsule by mouth At Night As Needed (restless leg syndrome). 30 capsule 2    ferrous gluconate (FERGON) 324 MG tablet Take 1 tablet by mouth Daily With Breakfast. 30 tablet 5     Current Facility-Administered Medications   Medication Dose Route Frequency Provider Last Rate Last Admin    cyanocobalamin injection 1,000 mcg  1,000 mcg Intramuscular Q28 Days Macey Brewer APRN   1,000 mcg at 05/02/22 1023            Assessment & Plan     Problem List Items Addressed This Visit       Restless leg syndrome    Relevant Medications    gabapentin (NEURONTIN) 100 MG capsule    Other Relevant Orders    Ambulatory Referral to Physical Therapy for Evaluation & Treatment (Completed)     Other Visit Diagnoses       Iron deficiency anemia, unspecified iron deficiency anemia type    -  Primary    Relevant Medications    ferrous gluconate (FERGON) 324 MG tablet              ICD-10-CM ICD-9-CM   1. Iron deficiency anemia, unspecified iron  deficiency anemia type  D50.9 280.9   2. Restless leg syndrome  G25.81 333.94     Plan: Continue gabapentin for restless leg syndrome.  Rasheed reviewed and appropriate.  UDS reviewed and appropriate.  Controlled substance contract is on the chart.  Start iron for low iron.  Follow-up in 3 months and as needed.    @Body mass index is 26.64 kg/m².            Understands disease processes and need for medications.  Understands reasons for urgent and emergent care.  Patient (& family) verbalized agreement for treatment plan.   Emotional support and active listening provided.  Patient provided time to verbalize feelings.                  This document has been electronically signed by TONO Butts   June 6, 2024 11:44 EDT

## 2024-06-07 ENCOUNTER — PATIENT ROUNDING (BHMG ONLY) (OUTPATIENT)
Dept: FAMILY MEDICINE CLINIC | Facility: CLINIC | Age: 49
End: 2024-06-07
Payer: COMMERCIAL

## 2024-09-06 ENCOUNTER — OFFICE VISIT (OUTPATIENT)
Dept: FAMILY MEDICINE CLINIC | Facility: CLINIC | Age: 49
End: 2024-09-06
Payer: COMMERCIAL

## 2024-09-06 VITALS
BODY MASS INDEX: 27.85 KG/M2 | HEART RATE: 68 BPM | TEMPERATURE: 97.5 F | WEIGHT: 217 LBS | DIASTOLIC BLOOD PRESSURE: 80 MMHG | RESPIRATION RATE: 16 BRPM | SYSTOLIC BLOOD PRESSURE: 130 MMHG | OXYGEN SATURATION: 98 % | HEIGHT: 74 IN

## 2024-09-06 DIAGNOSIS — G25.81 RESTLESS LEG SYNDROME: Primary | Chronic | ICD-10-CM

## 2024-09-06 DIAGNOSIS — G89.29 CHRONIC LEFT SHOULDER PAIN: ICD-10-CM

## 2024-09-06 DIAGNOSIS — E78.2 MIXED HYPERLIPIDEMIA: Chronic | ICD-10-CM

## 2024-09-06 DIAGNOSIS — M25.512 CHRONIC LEFT SHOULDER PAIN: ICD-10-CM

## 2024-09-06 PROCEDURE — 99214 OFFICE O/P EST MOD 30 MIN: CPT | Performed by: NURSE PRACTITIONER

## 2024-09-06 NOTE — PROGRESS NOTES
"Subjective   Devan Alarcon is a 48 y.o. male.     Chief Complaint   Patient presents with    Hyperlipidemia    Restless Legs Syndrome     Left shoulder    Left shoulder pain       History of Present Illness  He presents for follow up of mixed hyperlipidemia and RLS. He states he is not taking anything for the cholesterol. He states the gabapentin helps his RLS but it makes him too drowsy so he doesn't take it on days he has to work. He presents with c/o worsening left shoulder pain that got worse a couple days ago. He thinks putting his arm up on the window and cough aggravates it.        The following portions of the patient's history were reviewed and updated as appropriate: allergies, current medications, past family history, past medical history, past social history, past surgical history and problem list.    Review of Systems   Constitutional:  Negative for fever.   Respiratory:  Negative for cough, shortness of breath and wheezing.    Cardiovascular:  Negative for chest pain and palpitations.   Gastrointestinal:  Positive for diarrhea. Negative for nausea (chronic) and vomiting.   Genitourinary:  Negative for dysuria and hematuria.   Musculoskeletal:  Positive for arthralgias and myalgias.       Objective     /80 (BP Location: Right arm, Patient Position: Sitting, Cuff Size: Large Adult)   Pulse 68   Temp 97.5 °F (36.4 °C) (Oral)   Resp 16   Ht 188 cm (74.02\")   Wt 98.4 kg (217 lb)   SpO2 98%   BMI 27.85 kg/m²     Physical Exam  Vitals reviewed.   Constitutional:       General: He is not in acute distress.     Appearance: He is well-developed. He is not diaphoretic.   HENT:      Head: Normocephalic and atraumatic.   Cardiovascular:      Rate and Rhythm: Normal rate and regular rhythm.      Heart sounds: Normal heart sounds. No murmur heard.     No friction rub. No gallop.   Pulmonary:      Effort: Pulmonary effort is normal. No respiratory distress.      Breath sounds: Normal breath sounds. "   Abdominal:      General: Bowel sounds are normal. There is no distension.      Palpations: Abdomen is soft.      Tenderness: There is no abdominal tenderness.   Musculoskeletal:      Left shoulder: Normal.   Skin:     General: Skin is warm and dry.   Neurological:      Mental Status: He is alert and oriented to person, place, and time.   Psychiatric:         Behavior: Behavior normal.         Thought Content: Thought content normal.         Judgment: Judgment normal.         Current Outpatient Medications   Medication Sig Dispense Refill    albuterol sulfate  (90 Base) MCG/ACT inhaler Inhale 2 puffs Every 4 (Four) Hours As Needed for Wheezing. 18 g 2    cyclobenzaprine (FLEXERIL) 5 MG tablet Take 1 tablet by mouth 3 (Three) Times a Day As Needed for Muscle Spasms. 90 tablet 5    dicyclomine (BENTYL) 20 MG tablet Take 1 tablet by mouth 2 (Two) Times a Day. 60 tablet 5    ferrous gluconate (FERGON) 324 MG tablet Take 1 tablet by mouth Daily With Breakfast. 30 tablet 5    fexofenadine (Allegra Allergy) 180 MG tablet Take 1 tablet by mouth Daily. 30 tablet 5    gabapentin (NEURONTIN) 100 MG capsule Take 1 capsule by mouth At Night As Needed (restless leg syndrome). 30 capsule 2     Current Facility-Administered Medications   Medication Dose Route Frequency Provider Last Rate Last Admin    cyanocobalamin injection 1,000 mcg  1,000 mcg Intramuscular Q28 Days Macey Brewer APRN   1,000 mcg at 05/02/22 1023            Assessment & Plan     Problem List Items Addressed This Visit       Hyperlipidemia    Restless leg syndrome - Primary     Other Visit Diagnoses       Chronic left shoulder pain                  ICD-10-CM ICD-9-CM   1. Restless leg syndrome  G25.81 333.94   2. Mixed hyperlipidemia  E78.2 272.2   3. Chronic left shoulder pain  M25.512 719.41    G89.29 338.29     Plan: We discussed x-ray of left shoulder but he declines for now.  He wanted a Toradol shot but he did take an Aleve this morning.  We  discussed referral to Ortho for his left shoulder pain but he declines.  Continue gabapentin as it makes his restless leg tolerable at night.  Rasheed and UDS reviewed and appropriate.  Follow-up in 6 months and as needed.    @Body mass index is 27.85 kg/m².         Understands disease processes and need for medications.  Understands reasons for urgent and emergent care.  Patient (& family) verbalized agreement for treatment plan.   Emotional support and active listening provided.  Patient provided time to verbalize feelings.                  This document has been electronically signed by TONO Butts   September 6, 2024 11:54 EDT

## 2024-10-28 ENCOUNTER — OFFICE VISIT (OUTPATIENT)
Dept: FAMILY MEDICINE CLINIC | Facility: CLINIC | Age: 49
End: 2024-10-28
Payer: COMMERCIAL

## 2024-10-28 VITALS
HEIGHT: 74 IN | DIASTOLIC BLOOD PRESSURE: 80 MMHG | RESPIRATION RATE: 16 BRPM | BODY MASS INDEX: 27.59 KG/M2 | SYSTOLIC BLOOD PRESSURE: 136 MMHG | WEIGHT: 215 LBS | HEART RATE: 82 BPM | TEMPERATURE: 96.6 F | OXYGEN SATURATION: 97 %

## 2024-10-28 DIAGNOSIS — J06.9 ACUTE URI: ICD-10-CM

## 2024-10-28 DIAGNOSIS — M25.512 ACUTE PAIN OF LEFT SHOULDER: ICD-10-CM

## 2024-10-28 DIAGNOSIS — R05.9 COUGH, UNSPECIFIED TYPE: Primary | ICD-10-CM

## 2024-10-28 LAB
EXPIRATION DATE: NORMAL
FLUAV AG UPPER RESP QL IA.RAPID: NOT DETECTED
FLUBV AG UPPER RESP QL IA.RAPID: NOT DETECTED
INTERNAL CONTROL: NORMAL
Lab: NORMAL
SARS-COV-2 AG UPPER RESP QL IA.RAPID: NOT DETECTED

## 2024-10-28 PROCEDURE — 99213 OFFICE O/P EST LOW 20 MIN: CPT | Performed by: NURSE PRACTITIONER

## 2024-10-28 PROCEDURE — 96372 THER/PROPH/DIAG INJ SC/IM: CPT | Performed by: NURSE PRACTITIONER

## 2024-10-28 PROCEDURE — 87428 SARSCOV & INF VIR A&B AG IA: CPT | Performed by: NURSE PRACTITIONER

## 2024-10-28 RX ORDER — CEFDINIR 300 MG/1
300 CAPSULE ORAL 2 TIMES DAILY
Qty: 20 CAPSULE | Refills: 0 | Status: SHIPPED | OUTPATIENT
Start: 2024-10-28

## 2024-10-28 RX ORDER — BENZONATATE 100 MG/1
100 CAPSULE ORAL 3 TIMES DAILY PRN
Qty: 30 CAPSULE | Refills: 0 | Status: SHIPPED | OUTPATIENT
Start: 2024-10-28

## 2024-10-28 RX ORDER — METHYLPREDNISOLONE 4 MG/1
TABLET ORAL
Qty: 21 TABLET | Refills: 0 | Status: SHIPPED | OUTPATIENT
Start: 2024-10-28

## 2024-10-28 RX ORDER — DEXTROMETHORPHAN HYDROBROMIDE AND PROMETHAZINE HYDROCHLORIDE 15; 6.25 MG/5ML; MG/5ML
5 SYRUP ORAL 4 TIMES DAILY PRN
Qty: 240 ML | Refills: 0 | Status: SHIPPED | OUTPATIENT
Start: 2024-10-28

## 2024-10-28 RX ORDER — KETOROLAC TROMETHAMINE 30 MG/ML
30 INJECTION, SOLUTION INTRAMUSCULAR; INTRAVENOUS ONCE
Status: COMPLETED | OUTPATIENT
Start: 2024-10-28 | End: 2024-10-28

## 2024-10-28 RX ADMIN — KETOROLAC TROMETHAMINE 30 MG: 30 INJECTION, SOLUTION INTRAMUSCULAR; INTRAVENOUS at 10:55

## 2024-10-28 NOTE — PROGRESS NOTES
"Subjective   Devan Alarcon is a 48 y.o. male.     Chief Complaint   Patient presents with    URI    Left shoulder pain       History of Present Illness  He presents with c/o a cold since Saturday. He c/o phlegm that is yellow and thick. He has lost his voice. He took dayquil and theraflu.  He also complained of chronic left shoulder pain that is acting up.  He states it hurts when he lifts his left arm.       The following portions of the patient's history were reviewed and updated as appropriate: allergies, current medications, past family history, past medical history, past social history, past surgical history and problem list.    Review of Systems   Constitutional:  Negative for fever.   HENT:  Positive for postnasal drip, rhinorrhea, sinus pressure, sinus pain, sneezing, sore throat and voice change.    Respiratory:  Positive for cough and wheezing. Negative for shortness of breath.    Cardiovascular:  Negative for chest pain and palpitations.   Gastrointestinal:  Negative for diarrhea, nausea and vomiting.   Neurological:  Positive for headaches. Negative for dizziness.       Objective     /80 (BP Location: Right arm, Patient Position: Sitting, Cuff Size: Large Adult)   Pulse 82   Temp 96.6 °F (35.9 °C) (Core)   Resp 16   Ht 188 cm (74.02\")   Wt 97.5 kg (215 lb)   SpO2 97%   BMI 27.59 kg/m²     Physical Exam  Vitals reviewed.   Constitutional:       General: He is not in acute distress.     Appearance: He is well-developed. He is not diaphoretic.   HENT:      Head: Normocephalic and atraumatic.      Right Ear: Hearing, tympanic membrane, ear canal and external ear normal.      Left Ear: Hearing, tympanic membrane, ear canal and external ear normal.      Nose: Nose normal.      Right Sinus: No maxillary sinus tenderness or frontal sinus tenderness.      Left Sinus: No maxillary sinus tenderness or frontal sinus tenderness.      Mouth/Throat:      Pharynx: Uvula midline.   Eyes:      General: Lids " are normal.      Conjunctiva/sclera: Conjunctivae normal.      Pupils: Pupils are equal, round, and reactive to light.   Neck:      Trachea: Trachea normal. No tracheal tenderness or tracheal deviation.   Cardiovascular:      Rate and Rhythm: Normal rate and regular rhythm.      Heart sounds: Normal heart sounds, S1 normal and S2 normal. No murmur heard.     No friction rub. No gallop.   Pulmonary:      Effort: Pulmonary effort is normal. No respiratory distress.      Breath sounds: Normal breath sounds.   Abdominal:      General: Bowel sounds are normal. There is no distension.      Palpations: Abdomen is soft.      Tenderness: There is no abdominal tenderness.   Musculoskeletal:      Left shoulder: Decreased range of motion.      Comments: Decreased extension left shoulder   Skin:     General: Skin is warm and dry.   Neurological:      Mental Status: He is alert and oriented to person, place, and time.   Psychiatric:         Behavior: Behavior normal.         Thought Content: Thought content normal.         Judgment: Judgment normal.         Current Outpatient Medications   Medication Sig Dispense Refill    albuterol sulfate  (90 Base) MCG/ACT inhaler Inhale 2 puffs Every 4 (Four) Hours As Needed for Wheezing. 18 g 2    cyclobenzaprine (FLEXERIL) 5 MG tablet Take 1 tablet by mouth 3 (Three) Times a Day As Needed for Muscle Spasms. 90 tablet 5    dicyclomine (BENTYL) 20 MG tablet Take 1 tablet by mouth 2 (Two) Times a Day. 60 tablet 5    ferrous gluconate (FERGON) 324 MG tablet Take 1 tablet by mouth Daily With Breakfast. 30 tablet 5    fexofenadine (Allegra Allergy) 180 MG tablet Take 1 tablet by mouth Daily. 30 tablet 5    gabapentin (NEURONTIN) 100 MG capsule Take 1 capsule by mouth At Night As Needed (restless leg syndrome). 30 capsule 2    cefdinir (OMNICEF) 300 MG capsule Take 1 capsule by mouth 2 (Two) Times a Day. 20 capsule 0    methylPREDNISolone (MEDROL) 4 MG dose pack Take as directed on package  instructions. 21 tablet 0     Current Facility-Administered Medications   Medication Dose Route Frequency Provider Last Rate Last Admin    cyanocobalamin injection 1,000 mcg  1,000 mcg Intramuscular Q28 Days Macey Brewer APRN   1,000 mcg at 05/02/22 1023            Assessment & Plan     Problem List Items Addressed This Visit    None  Visit Diagnoses       Cough, unspecified type    -  Primary    Relevant Orders    POCT SARS-CoV-2 + Flu Antigen CAESAR (Completed)    Acute URI        Relevant Medications    cefdinir (OMNICEF) 300 MG capsule    methylPREDNISolone (MEDROL) 4 MG dose pack    Acute pain of left shoulder        Relevant Medications    ketorolac (TORADOL) injection 30 mg (Completed)              ICD-10-CM ICD-9-CM   1. Cough, unspecified type  R05.9 786.2   2. Acute URI  J06.9 465.9   3. Acute pain of left shoulder  M25.512 719.41       Plan: COVID and flu negative.  Cefdinir and Medrol ordered for acute URI.  Toradol given for left shoulder pain.  Keep scheduled follow-up and follow-up as needed.    @Body mass index is 27.59 kg/m².         Understands disease processes and need for medications.  Understands reasons for urgent and emergent care.  Patient (& family) verbalized agreement for treatment plan.   Emotional support and active listening provided.  Patient provided time to verbalize feelings.                  This document has been electronically signed by TONO Butts   October 28, 2024 11:42 EDT

## 2024-10-28 NOTE — PROGRESS NOTES
Injection  Injection performed in left ventralgluteal by Lila Arambula MA. Patient tolerated the procedure well without complications.  10/28/24   Lila Arambula MA

## 2024-11-07 ENCOUNTER — OFFICE VISIT (OUTPATIENT)
Dept: GASTROENTEROLOGY | Facility: CLINIC | Age: 49
End: 2024-11-07
Payer: COMMERCIAL

## 2024-11-07 VITALS
WEIGHT: 216 LBS | SYSTOLIC BLOOD PRESSURE: 136 MMHG | HEART RATE: 72 BPM | HEIGHT: 74 IN | BODY MASS INDEX: 27.72 KG/M2 | DIASTOLIC BLOOD PRESSURE: 87 MMHG

## 2024-11-07 DIAGNOSIS — Z12.11 ENCOUNTER FOR SCREENING FOR MALIGNANT NEOPLASM OF COLON: Primary | ICD-10-CM

## 2024-11-07 DIAGNOSIS — Z86.0100 PERSONAL HISTORY OF COLON POLYPS, UNSPECIFIED: ICD-10-CM

## 2024-11-07 DIAGNOSIS — K57.92 DIVERTICULITIS: ICD-10-CM

## 2024-11-07 DIAGNOSIS — R19.7 DIARRHEA, UNSPECIFIED TYPE: ICD-10-CM

## 2024-11-07 DIAGNOSIS — K92.1 HEMATOCHEZIA: ICD-10-CM

## 2024-11-07 DIAGNOSIS — Z85.01 PERSONAL HISTORY OF ESOPHAGEAL CANCER: ICD-10-CM

## 2024-11-07 RX ORDER — SODIUM, POTASSIUM,MAG SULFATES 17.5-3.13G
1 SOLUTION, RECONSTITUTED, ORAL ORAL TAKE AS DIRECTED
Qty: 354 ML | Refills: 0 | Status: SHIPPED | OUTPATIENT
Start: 2024-11-07

## 2024-11-07 RX ORDER — BISACODYL 5 MG/1
20 TABLET, DELAYED RELEASE ORAL ONCE
Qty: 4 TABLET | Refills: 0 | Status: SHIPPED | OUTPATIENT
Start: 2024-11-07 | End: 2024-11-07

## 2024-12-10 PROBLEM — Z86.0100 PERSONAL HISTORY OF COLON POLYPS, UNSPECIFIED: Status: ACTIVE | Noted: 2024-11-07

## 2024-12-10 PROBLEM — Z12.11 ENCOUNTER FOR SCREENING FOR MALIGNANT NEOPLASM OF COLON: Status: ACTIVE | Noted: 2024-11-07

## 2024-12-10 RX ORDER — OSELTAMIVIR PHOSPHATE 75 MG/1
75 CAPSULE ORAL 2 TIMES DAILY
Qty: 10 CAPSULE | Refills: 0 | Status: SHIPPED | OUTPATIENT
Start: 2024-12-10

## 2024-12-19 ENCOUNTER — ANESTHESIA (OUTPATIENT)
Dept: PERIOP | Facility: HOSPITAL | Age: 49
End: 2024-12-19
Payer: COMMERCIAL

## 2024-12-19 ENCOUNTER — HOSPITAL ENCOUNTER (OUTPATIENT)
Facility: HOSPITAL | Age: 49
Setting detail: HOSPITAL OUTPATIENT SURGERY
Discharge: HOME | End: 2024-12-19
Attending: INTERNAL MEDICINE | Admitting: INTERNAL MEDICINE
Payer: COMMERCIAL

## 2024-12-19 ENCOUNTER — APPOINTMENT (OUTPATIENT)
Dept: CT IMAGING | Facility: HOSPITAL | Age: 49
End: 2024-12-19
Payer: COMMERCIAL

## 2024-12-19 ENCOUNTER — ANESTHESIA EVENT (OUTPATIENT)
Dept: PERIOP | Facility: HOSPITAL | Age: 49
End: 2024-12-19
Payer: COMMERCIAL

## 2024-12-19 VITALS
TEMPERATURE: 97.5 F | RESPIRATION RATE: 18 BRPM | DIASTOLIC BLOOD PRESSURE: 92 MMHG | OXYGEN SATURATION: 99 % | SYSTOLIC BLOOD PRESSURE: 135 MMHG | WEIGHT: 210 LBS | HEIGHT: 74 IN | BODY MASS INDEX: 26.95 KG/M2 | HEART RATE: 65 BPM

## 2024-12-19 DIAGNOSIS — K57.92 DIVERTICULITIS: ICD-10-CM

## 2024-12-19 DIAGNOSIS — Z12.11 ENCOUNTER FOR SCREENING FOR MALIGNANT NEOPLASM OF COLON: ICD-10-CM

## 2024-12-19 DIAGNOSIS — Z86.0100 PERSONAL HISTORY OF COLON POLYPS, UNSPECIFIED: ICD-10-CM

## 2024-12-19 LAB
ANION GAP SERPL CALCULATED.3IONS-SCNC: 7.8 MMOL/L (ref 5–15)
BUN SERPL-MCNC: 9 MG/DL (ref 6–20)
BUN/CREAT SERPL: 9.6 (ref 7–25)
CALCIUM SPEC-SCNC: 8.7 MG/DL (ref 8.6–10.5)
CHLORIDE SERPL-SCNC: 102 MMOL/L (ref 98–107)
CO2 SERPL-SCNC: 28.2 MMOL/L (ref 22–29)
CREAT SERPL-MCNC: 0.94 MG/DL (ref 0.76–1.27)
EGFRCR SERPLBLD CKD-EPI 2021: 98.8 ML/MIN/1.73
GLUCOSE SERPL-MCNC: 88 MG/DL (ref 65–99)
POTASSIUM SERPL-SCNC: 4.2 MMOL/L (ref 3.5–5.2)
SODIUM SERPL-SCNC: 138 MMOL/L (ref 136–145)

## 2024-12-19 PROCEDURE — 80048 BASIC METABOLIC PNL TOTAL CA: CPT | Performed by: INTERNAL MEDICINE

## 2024-12-19 PROCEDURE — 25010000002 PROPOFOL 200 MG/20ML EMULSION: Performed by: NURSE ANESTHETIST, CERTIFIED REGISTERED

## 2024-12-19 PROCEDURE — 25510000001 IOPAMIDOL 61 % SOLUTION: Performed by: INTERNAL MEDICINE

## 2024-12-19 PROCEDURE — 45378 DIAGNOSTIC COLONOSCOPY: CPT | Performed by: INTERNAL MEDICINE

## 2024-12-19 PROCEDURE — 74177 CT ABD & PELVIS W/CONTRAST: CPT

## 2024-12-19 PROCEDURE — 74177 CT ABD & PELVIS W/CONTRAST: CPT | Performed by: RADIOLOGY

## 2024-12-19 RX ORDER — FENTANYL CITRATE 50 UG/ML
50 INJECTION, SOLUTION INTRAMUSCULAR; INTRAVENOUS
Status: DISCONTINUED | OUTPATIENT
Start: 2024-12-19 | End: 2024-12-19 | Stop reason: HOSPADM

## 2024-12-19 RX ORDER — SODIUM CHLORIDE, SODIUM LACTATE, POTASSIUM CHLORIDE, CALCIUM CHLORIDE 600; 310; 30; 20 MG/100ML; MG/100ML; MG/100ML; MG/100ML
100 INJECTION, SOLUTION INTRAVENOUS ONCE AS NEEDED
Status: DISCONTINUED | OUTPATIENT
Start: 2024-12-19 | End: 2024-12-19 | Stop reason: HOSPADM

## 2024-12-19 RX ORDER — ONDANSETRON 2 MG/ML
4 INJECTION INTRAMUSCULAR; INTRAVENOUS AS NEEDED
Status: DISCONTINUED | OUTPATIENT
Start: 2024-12-19 | End: 2024-12-19 | Stop reason: HOSPADM

## 2024-12-19 RX ORDER — IPRATROPIUM BROMIDE AND ALBUTEROL SULFATE 2.5; .5 MG/3ML; MG/3ML
3 SOLUTION RESPIRATORY (INHALATION) ONCE AS NEEDED
Status: DISCONTINUED | OUTPATIENT
Start: 2024-12-19 | End: 2024-12-19 | Stop reason: HOSPADM

## 2024-12-19 RX ORDER — OXYCODONE AND ACETAMINOPHEN 5; 325 MG/1; MG/1
1 TABLET ORAL ONCE AS NEEDED
Status: DISCONTINUED | OUTPATIENT
Start: 2024-12-19 | End: 2024-12-19 | Stop reason: HOSPADM

## 2024-12-19 RX ORDER — PROPOFOL 10 MG/ML
INJECTION, EMULSION INTRAVENOUS AS NEEDED
Status: DISCONTINUED | OUTPATIENT
Start: 2024-12-19 | End: 2024-12-19 | Stop reason: SURG

## 2024-12-19 RX ORDER — MEPERIDINE HYDROCHLORIDE 25 MG/ML
12.5 INJECTION INTRAMUSCULAR; INTRAVENOUS; SUBCUTANEOUS
Status: DISCONTINUED | OUTPATIENT
Start: 2024-12-19 | End: 2024-12-19 | Stop reason: HOSPADM

## 2024-12-19 RX ORDER — SODIUM CHLORIDE, SODIUM LACTATE, POTASSIUM CHLORIDE, CALCIUM CHLORIDE 600; 310; 30; 20 MG/100ML; MG/100ML; MG/100ML; MG/100ML
125 INJECTION, SOLUTION INTRAVENOUS ONCE
Status: DISCONTINUED | OUTPATIENT
Start: 2024-12-19 | End: 2024-12-19 | Stop reason: HOSPADM

## 2024-12-19 RX ORDER — SODIUM CHLORIDE 9 MG/ML
40 INJECTION, SOLUTION INTRAVENOUS AS NEEDED
Status: DISCONTINUED | OUTPATIENT
Start: 2024-12-19 | End: 2024-12-19 | Stop reason: HOSPADM

## 2024-12-19 RX ORDER — KETOROLAC TROMETHAMINE 30 MG/ML
30 INJECTION, SOLUTION INTRAMUSCULAR; INTRAVENOUS EVERY 6 HOURS PRN
Status: DISCONTINUED | OUTPATIENT
Start: 2024-12-19 | End: 2024-12-19 | Stop reason: HOSPADM

## 2024-12-19 RX ORDER — SODIUM CHLORIDE 0.9 % (FLUSH) 0.9 %
10 SYRINGE (ML) INJECTION AS NEEDED
Status: DISCONTINUED | OUTPATIENT
Start: 2024-12-19 | End: 2024-12-19 | Stop reason: HOSPADM

## 2024-12-19 RX ORDER — MIDAZOLAM HYDROCHLORIDE 1 MG/ML
1 INJECTION, SOLUTION INTRAMUSCULAR; INTRAVENOUS
Status: DISCONTINUED | OUTPATIENT
Start: 2024-12-19 | End: 2024-12-19 | Stop reason: HOSPADM

## 2024-12-19 RX ORDER — IOPAMIDOL 612 MG/ML
100 INJECTION, SOLUTION INTRAVASCULAR
Status: COMPLETED | OUTPATIENT
Start: 2024-12-19 | End: 2024-12-19

## 2024-12-19 RX ORDER — SODIUM CHLORIDE 0.9 % (FLUSH) 0.9 %
10 SYRINGE (ML) INJECTION EVERY 12 HOURS SCHEDULED
Status: DISCONTINUED | OUTPATIENT
Start: 2024-12-19 | End: 2024-12-19 | Stop reason: HOSPADM

## 2024-12-19 RX ADMIN — IOPAMIDOL 100 ML: 612 INJECTION, SOLUTION INTRAVENOUS at 14:22

## 2024-12-19 RX ADMIN — PROPOFOL 50 MG: 10 INJECTION, EMULSION INTRAVENOUS at 12:19

## 2024-12-19 RX ADMIN — PROPOFOL 200 MCG/KG/MIN: 10 INJECTION, EMULSION INTRAVENOUS at 12:20

## 2024-12-19 NOTE — ANESTHESIA POSTPROCEDURE EVALUATION
Patient: Devan Alarcon    Procedure Summary       Date: 12/19/24 Room / Location:  [facility] [address] [place] [address] [facility]    Anesthesia Start: 1217 Anesthesia Stop: 1246    Procedure: COLONOSCOPY Diagnosis:       Encounter for screening for malignant neoplasm of colon      Personal history of colon polyps, unspecified      Diverticulitis      (Encounter for screening for malignant neoplasm of colon [Z12.11])      (Personal history of colon polyps, unspecified [Z86.0100])      (Diverticulitis [K57.92])    Surgeons: Joi Bains MD Provider: Santosh Dorsey MD    Anesthesia Type: general ASA Status: 2            Anesthesia Type: general    Vitals  Vitals Value Taken Time   /91 12/19/24 1350   Temp 97.5 °F (36.4 °C) 12/19/24 1250   Pulse 66 12/19/24 1355   Resp 18 12/19/24 1250   SpO2 97 % 12/19/24 1355   Vitals shown include unfiled device data.        Post Anesthesia Care and Evaluation    Patient location during evaluation: PACU  Patient participation: complete - patient participated  Level of consciousness: awake  Pain score: 1  Pain management: adequate    Airway patency: patent  Anesthetic complications: No anesthetic complications  PONV Status: controlled  Cardiovascular status: acceptable and blood pressure returned to baseline  Respiratory status: acceptable and room air  Hydration status: acceptable    Comments: Patient comfortable with discharge at this time.

## 2024-12-19 NOTE — ANESTHESIA PREPROCEDURE EVALUATION
Anesthesia Evaluation     Patient summary reviewed and Nursing notes reviewed   no history of anesthetic complications:   NPO Solid Status: > 8 hours  NPO Liquid Status: > 8 hours           Airway   Mallampati: II  TM distance: >3 FB  Neck ROM: full  No difficulty expected  Dental - normal exam   (+) edentulous    Pulmonary - normal exam   (+) a smoker Current, Abstained day of surgery, pipes,  Cardiovascular - normal exam  Exercise tolerance: good (4-7 METS)    NYHA Classification: II  Rhythm: regular  Rate: normal    (+) dysrhythmias Paroxysmal Atrial Fib, hyperlipidemia      Neuro/Psych  (+) headaches, psychiatric history Anxiety  GI/Hepatic/Renal/Endo    (+) GERD    Musculoskeletal     Abdominal  - normal exam    Bowel sounds: normal.   Substance History - negative use     OB/GYN negative ob/gyn ROS         Other   arthritis,   history of cancer remission    ROS/Med Hx Other: No chemo or XRT.                      Anesthesia Plan    ASA 2     general   total IV anesthesia  (  )  intravenous induction     Anesthetic plan, risks, benefits, and alternatives have been provided, discussed and informed consent has been obtained with: patient.

## 2024-12-19 NOTE — H&P
[facility]    Patient Identification:  Name:  Devan Alarcon  Age:  49 y.o.  Sex:  male  :  1975  MRN:  [id number]   Visit Number:  [id number]  Primary Care Physician:  Macey Brewer APRN       Chief complaint: Personal history of colon polyps, hematochezia, diarrhea (now improved)    History of presenting illness: Mr. Alarcon is a 49 y.o. male who presents today for colonoscopy.  Patient has history of adenocarcinoma of the esophagus s/p esophagectomy in 2018.  He has continued to follow with oncology with PET/CT annually and has been doing well in this regard.  He currently denies having difficulty with GERD or dysphagia.  Patient reports chronic difficulty with diarrhea since his esophagectomy which has improved with taking Bentyl 20 mg once to twice daily. In clinic, he reported having 1-6 BMs per day with stool type IV-V and occasionally type VI on [place] stool scale.  He was advised to start fiber supplement such as citrucel which has been very helpful. He is now having daily, normal BM.   He also reports history of recurrent diverticulitis but denies having any recent flares.  He does have intermittent LLQ abdominal pain (currently denies).  He also occasionally notices hematochezia.  Denies melena.  Of note, he had previous colonoscopy on 2018 and had four polyps removed in the sigmoid colon.  Two colon polyps were tubular adenomas. The remaining polyps were hyperplastic. Patient denies family history of colon cancer.  He denies unusual weight loss.  He has no other complaints today.     Review of Systems   Constitutional: Negative.    HENT: Negative.     Eyes: Negative.    Respiratory: Negative.     Cardiovascular: Negative.    Gastrointestinal:  Positive for blood in stool and diarrhea. Negative for abdominal distention, abdominal pain, anal bleeding, constipation, nausea, rectal pain and vomiting.   Endocrine: Negative.     Genitourinary: Negative.    Musculoskeletal: Negative.    Allergic/Immunologic: Negative.    Neurological: Negative.    Hematological: Negative.         Past Medical History:   Diagnosis Date    Arm fracture     left arm     Arthritis     knee and hands    Esophageal cancer     Oct 2018    Esophageal cancer     GERD (gastroesophageal reflux disease)     History of migraine headaches     Hyperlipidemia     Hyperlipidemia 01/20/2017     Past Surgical History:   Procedure Laterality Date    COLONOSCOPY      COLONOSCOPY N/A 11/10/2021    Procedure: COLONOSCOPY;  Surgeon: Joi Bains MD;  Location: [facility];  Service: Gastroenterology;  Laterality: N/A;    ENDOSCOPY N/A 9/20/2018    Procedure: ESOPHAGOGASTRODUODENOSCOPY WITH BIOPSY CPT CODE: 14159;  Surgeon: Baron Mccall MD;  Location: [facility] OR;  Service: Gastroenterology    ENDOSCOPY N/A 10/17/2018    Procedure: ESOPHAGOGASTRODUODENOSCOPY WITH BIOPSY CPT CODE: 33937;  Surgeon: Baron Mccall MD;  Location: [facility] OR;  Service: Gastroenterology    ENDOSCOPY      with ablation    ESOPHAGECTOMY      MOUTH SURGERY      UPPER GASTROINTESTINAL ENDOSCOPY      VENTRAL/INCISIONAL HERNIA REPAIR N/A 7/12/2019    Procedure: VENTRAL/INCISIONAL HERNIA REPAIR LAPAROSCOPIC;  Surgeon: Jaswinder Goodrich MD;  Location: [facility] OR;  Service: General     Family History   Problem Relation Age of Onset    Osteoporosis Mother     No Known Problems Father      Social History     Socioeconomic History    Marital status: Single   Tobacco Use    Smoking status: Every Day     Current packs/day: 1.00     Average packs/day: 1 pack/day for 25.6 years (25.6 ttl pk-yrs)     Types: Pipe, Cigarettes     Start date: 10/1/2015    Smokeless tobacco: Never   Vaping Use    Vaping status: Never Used   Substance and Sexual Activity    Alcohol use: No    Drug use: No    Sexual activity: Defer       Allergies:  Patient has no known allergies.    Prior to Admission Medications        "Prescriptions Last Dose Informant Patient Reported? Taking?    gabapentin (NEURONTIN) 100 MG capsule Past Week  No Yes    Take 1 capsule by mouth At Night As Needed (restless leg syndrome).    albuterol sulfate  (90 Base) MCG/ACT inhaler   No No    Inhale 2 puffs Every 4 (Four) Hours As Needed for Wheezing.    cyclobenzaprine (FLEXERIL) 5 MG tablet   No No    Take 1 tablet by mouth 3 (Three) Times a Day As Needed for Muscle Spasms.    dicyclomine (BENTYL) 20 MG tablet   No No    Take 1 tablet by mouth 2 (Two) Times a Day.    ferrous gluconate (FERGON) 324 MG tablet   No No    Take 1 tablet by mouth Daily With Breakfast.    fexofenadine (Allegra Allergy) 180 MG tablet More than a month  No No    Take 1 tablet by mouth Daily.    promethazine-dextromethorphan (PROMETHAZINE-DM) 6.25-15 MG/5ML syrup   No No    Take 5 mL by mouth 4 (Four) Times a Day As Needed for Cough.    sodium-potassium-magnesium sulfates (SUPREP) 17.5-3.13-1.6 GM/177ML solution oral solution   No No    Take 1 bottle by mouth Take As Directed for 2 doses. Take one bottle with 16oz of water. Then within the hour drink an additional 32oz of water.              Vital Signs:     Vitals:    12/19/24 1133   BP: 148/88   BP Location: Left arm   Patient Position: Lying   Pulse: 72   Resp: 18   Temp: 97.7 °F (36.5 °C)   TempSrc: Temporal   SpO2: 100%   Weight: 95.3 kg (210 lb)   Height: 188 cm (74\")      Body mass index is 26.96 kg/m².    Physical Exam:  Constitutional:  Alert and oriented. Well developed and well nourished, in no acute distress.  HENT:  Head: Normocephalic and atraumatic.  Mouth:  Moist mucous membranes.  OP clear, mmm  Eyes:  Conjunctivae and EOM are normal.  Pupils are equal, round, and reactive to light.  No scleral icterus.  Neck:  Neck supple.  No JVD present.    Cardiovascular:  RRR, no MRG.  Pulmonary/Chest:  CTAB, unlabored.   Abdominal:  Soft.  Bowel sounds are normal.  No distension and no tenderness.   Musculoskeletal:  No " edema, no tenderness, and no deformity.   Neurological:  MS as above, grossly nonfocal exam   Psychiatric:  Normal mood and affect.  Behavior is normal.  Judgment and thought content normal.         Assessment and Plan:  Proceed with colonoscopy due to personal history of colon polyps, diarrhea (currently improved), hematochezia and history of diverticulitis.     Mariposa Atkinson, APRN  12/19/24  11:04 EST

## 2024-12-20 ENCOUNTER — TELEPHONE (OUTPATIENT)
Dept: GASTROENTEROLOGY | Facility: CLINIC | Age: 49
End: 2024-12-20
Payer: COMMERCIAL

## 2024-12-20 DIAGNOSIS — K57.92 DIVERTICULITIS: Primary | ICD-10-CM

## 2024-12-20 RX ORDER — METRONIDAZOLE 500 MG/1
500 TABLET ORAL 3 TIMES DAILY
Qty: 42 TABLET | Refills: 0 | Status: SHIPPED | OUTPATIENT
Start: 2024-12-20 | End: 2025-01-03

## 2024-12-20 RX ORDER — CIPROFLOXACIN 500 MG/1
500 TABLET, FILM COATED ORAL 2 TIMES DAILY
Qty: 28 TABLET | Refills: 0 | Status: SHIPPED | OUTPATIENT
Start: 2024-12-20

## 2024-12-20 NOTE — TELEPHONE ENCOUNTER
Patient called stating he was told this morning that he would have antibiotics sent to [facility] in [place], medication is not at the pharmacy.

## 2024-12-20 NOTE — TELEPHONE ENCOUNTER
Patient called stating he was told this morning that he would have antibiotics sent to [facility] in [place], medication is not at the pharmacy.

## 2024-12-29 DIAGNOSIS — K57.20 ABSCESS OF SIGMOID COLON DUE TO DIVERTICULITIS: Primary | ICD-10-CM

## 2024-12-29 NOTE — PROGRESS NOTES
As discussed by phone, your CT scan of the abdomen pelvis did reveal a markedly thickened and inflamed sigmoid colon.  There appears to be a track from the colon to the bladder.  I have arranged for you to see Dr. Cora Almaraz colorectal surgeon in [facility] for surgical management.

## 2025-02-13 DIAGNOSIS — M79.604 PAIN IN BOTH LOWER EXTREMITIES: Chronic | ICD-10-CM

## 2025-02-13 DIAGNOSIS — K57.92 DIVERTICULITIS: Chronic | ICD-10-CM

## 2025-02-13 DIAGNOSIS — M79.605 PAIN IN BOTH LOWER EXTREMITIES: Chronic | ICD-10-CM

## 2025-02-13 RX ORDER — DICYCLOMINE HCL 20 MG
20 TABLET ORAL 2 TIMES DAILY
Qty: 60 TABLET | Refills: 5 | Status: SHIPPED | OUTPATIENT
Start: 2025-02-13

## 2025-02-13 RX ORDER — CYCLOBENZAPRINE HCL 5 MG
5 TABLET ORAL 3 TIMES DAILY PRN
Qty: 90 TABLET | Refills: 5 | Status: SHIPPED | OUTPATIENT
Start: 2025-02-13

## 2025-02-13 NOTE — TELEPHONE ENCOUNTER
Caller: Devan Alarcon    Relationship: Self    Best call back number: [phone]     Requested Prescriptions:   Requested Prescriptions     Pending Prescriptions Disp Refills    dicyclomine (BENTYL) 20 MG tablet 60 tablet 5     Sig: Take 1 tablet by mouth 2 (Two) Times a Day.    cyclobenzaprine (FLEXERIL) 5 MG tablet 90 tablet 5     Sig: Take 1 tablet by mouth 3 (Three) Times a Day As Needed for Muscle Spasms.        Pharmacy where request should be sent: [place] [id number] [facility] [address] - [phone]  - [phone] FX     Last office visit with prescribing clinician: 10/28/2024   Last telemedicine visit with prescribing clinician: Visit date not found   Next office visit with prescribing clinician: 3/6/2025     Additional details provided by patient: PATIENT STATES HE HAS A WEEK LEFT ON MEDICATION. HIS APPOINTMENT GOT RESCHEDULED BY THE OFFICE AND HE WILL NEED REFILLS TO MAKE IT TO NEXT APPOINTMENT IN MARCH.    Does the patient have less than a 3 day supply:  [] Yes  [x] No      Kris Giles Rep   02/13/25 13:18 EST

## 2025-02-21 ENCOUNTER — OFFICE VISIT (OUTPATIENT)
Dept: GASTROENTEROLOGY | Facility: CLINIC | Age: 50
End: 2025-02-21
Payer: COMMERCIAL

## 2025-02-21 VITALS
HEIGHT: 74 IN | SYSTOLIC BLOOD PRESSURE: 139 MMHG | DIASTOLIC BLOOD PRESSURE: 95 MMHG | WEIGHT: 223 LBS | BODY MASS INDEX: 28.62 KG/M2 | HEART RATE: 68 BPM

## 2025-02-21 DIAGNOSIS — Z85.01 PERSONAL HISTORY OF ESOPHAGEAL CANCER: ICD-10-CM

## 2025-02-21 DIAGNOSIS — K57.92 DIVERTICULITIS: Primary | ICD-10-CM

## 2025-02-21 DIAGNOSIS — R19.7 DIARRHEA, UNSPECIFIED TYPE: ICD-10-CM

## 2025-02-21 DIAGNOSIS — K92.1 HEMATOCHEZIA: ICD-10-CM

## 2025-02-21 DIAGNOSIS — Z86.0100 PERSONAL HISTORY OF COLON POLYPS, UNSPECIFIED: ICD-10-CM

## 2025-02-21 PROCEDURE — 99214 OFFICE O/P EST MOD 30 MIN: CPT | Performed by: NURSE PRACTITIONER

## 2025-02-21 NOTE — PROGRESS NOTES
DATE:  2/21/2025    REASON FOR FOLLOW UP:History of diverticulitis, hematochezia, History of esophogeal cancer    REFERRING PHYSICIAN:   TONO Quintanilla     CHIEF COMPLAINT:  Follow up of colonoscopy    HISTORY OF PRESENT ILLNESS:   Devan Alarcon is a very pleasant 49 y.o. male who is being seen today at the request of  TONO Quintanilla  for evaluation and treatment of hematochezia and given history of diverticulitis and esophageal cancer.  Patient has history of adenocarcinoma of the esophagus s/p esophagectomy in 2018.  He has continued to follow with oncology with PET/CT annually and has been doing well in this regard.  He currently denies having difficulty with GERD or dysphagia.  Patient reports chronic difficulty with diarrhea since his esophagectomy which has improved with taking Bentyl 20 mg once to twice daily.  At present,he reports having 1-6 BMs per day with stool type IV-V and occasionally type VI on [place] stool scale.  He reports history of recurrent diverticulitis but denies having any recent flares.  He does have intermittent LLQ abdominal pain.  He also occasionally notices hematochezia.  Denies melena.  Of note, he had previous colonoscopy on 11/27/2018 and had four polyps removed in the sigmoid colon.  Two colon polyps were tubular adenomas. The remaining polyps were hyperplastic. Patient denies family history of colon cancer.  He denies unusual weight loss.  He has no other complaints today.    INTERVAL HISTORY:  Mr. Alarcon presents today for follow up.  Since last visit, he underwent colonoscopy with Dr. Maynard on 12/19/2024 which was notable for diverticulosis in the sigmoid colon and in the descending colon along with grade 1 internal hemorrhoids.  He was also found to have a benign-appearing, intrinsic severe stenosis in the descending colon that was not transversed.  Following colonoscopy, obtained CT of the abdomen pelvis with contrast on 12/19/24 to further evaluate  which showed a markedly thickened and inflamed sigmoid colon.  Therefore, he was referred to colorectal surgery Dr. Tan in [place].He is scheduled for robotic low anterior resection with transanal extraction of the specimen, cystoscopy with bilateral ureteral catheters, lithotomy on 3/11. Patient reports colostomy/ileostomy also a possibility. At present, he reports his bowels move 1-2 times per day with stool type 4-6 on BSS. He is taking Bentyl 20 mg once daily which he says has helped with diarrhea.  He reports taking fiber as needed.  He continues to have frequent LLQ abdominal pain which he describes as a cramping pain and other times, sharp/stabbing.  He denies melena, hematochezia or rectal bleeding. He has no other complaints today.     PAST MEDICAL HISTORY:  Past Medical History:   Diagnosis Date    Arm fracture     left arm     Arthritis     knee and hands    Esophageal cancer     Oct 2018    Esophageal cancer     GERD (gastroesophageal reflux disease)     History of migraine headaches     Hyperlipidemia     Hyperlipidemia 01/20/2017       PAST SURGICAL HISTORY:  Past Surgical History:   Procedure Laterality Date    COLONOSCOPY      COLONOSCOPY N/A 11/10/2021    Procedure: COLONOSCOPY;  Surgeon: Joi Bains MD;  Location: [facility] OR;  Service: Gastroenterology;  Laterality: N/A;    COLONOSCOPY N/A 12/19/2024    Procedure: COLONOSCOPY;  Surgeon: Joi Bains MD;  Location: [facility] OR;  Service: Gastroenterology;  Laterality: N/A;    ENDOSCOPY N/A 9/20/2018    Procedure: ESOPHAGOGASTRODUODENOSCOPY WITH BIOPSY CPT CODE: 84758;  Surgeon: Baron Mccall MD;  Location: [facility] OR;  Service: Gastroenterology    ENDOSCOPY N/A 10/17/2018    Procedure: ESOPHAGOGASTRODUODENOSCOPY WITH BIOPSY CPT CODE: 69840;  Surgeon: Baron Mccall MD;  Location: [facility] OR;  Service: Gastroenterology    ENDOSCOPY      with ablation    ESOPHAGECTOMY      MOUTH SURGERY      UPPER  GASTROINTESTINAL ENDOSCOPY      VENTRAL/INCISIONAL HERNIA REPAIR N/A 7/12/2019    Procedure: VENTRAL/INCISIONAL HERNIA REPAIR LAPAROSCOPIC;  Surgeon: Jaswinder Goodrich MD;  Location: [facility];  Service: General       FAMILY HISTORY:  Family History   Problem Relation Age of Onset    Osteoporosis Mother     No Known Problems Father        SOCIAL HISTORY:  Social History     Socioeconomic History    Marital status: Single   Tobacco Use    Smoking status: Every Day     Current packs/day: 1.00     Average packs/day: 1 pack/day for 25.8 years (25.8 ttl pk-yrs)     Types: Pipe, Cigarettes     Start date: 10/1/2015    Smokeless tobacco: Never   Vaping Use    Vaping status: Never Used   Substance and Sexual Activity    Alcohol use: No    Drug use: No    Sexual activity: Defer       MEDICATIONS:  The current medication list was reviewed in the EMR    Current Outpatient Medications:     albuterol sulfate  (90 Base) MCG/ACT inhaler, Inhale 2 puffs Every 4 (Four) Hours As Needed for Wheezing., Disp: 18 g, Rfl: 2    ciprofloxacin (Cipro) 500 MG tablet, Take 1 tablet by mouth 2 (Two) Times a Day. Take one tablet orally twice a day for 14 days., Disp: 28 tablet, Rfl: 0    cyclobenzaprine (FLEXERIL) 5 MG tablet, Take 1 tablet by mouth 3 (Three) Times a Day As Needed for Muscle Spasms., Disp: 90 tablet, Rfl: 5    dicyclomine (BENTYL) 20 MG tablet, Take 1 tablet by mouth 2 (Two) Times a Day., Disp: 60 tablet, Rfl: 5    ferrous gluconate (FERGON) 324 MG tablet, Take 1 tablet by mouth Daily With Breakfast., Disp: 30 tablet, Rfl: 5    fexofenadine (Allegra Allergy) 180 MG tablet, Take 1 tablet by mouth Daily., Disp: 30 tablet, Rfl: 5    gabapentin (NEURONTIN) 100 MG capsule, Take 1 capsule by mouth At Night As Needed (restless leg syndrome)., Disp: 30 capsule, Rfl: 2    ALLERGIES:  No Known Allergies    REVIEW OF SYSTEMS:    A comprehensive 14 point review of systems was performed.  Significant findings as mentioned above.  All  "other systems reviewed and are negative.      Physical Exam   Vital Signs: /95 (BP Location: Left arm, Patient Position: Sitting, Cuff Size: Large Adult)   Pulse 68   Ht 188 cm (74\")   Wt 101 kg (223 lb)   BMI 28.63 kg/m²     General: Well developed, well nourished, alert and oriented x 3, in no acute distress.   Head: ATNC   Eyes: PERRL, No evidence of conjunctivitis.   Nose: No nasal discharge.   Mouth: Oral mucosal membranes moist. No oral ulceration or hemorrhages.   Neck: Neck supple. No thyromegaly. No JVD.   Lungs: Clear in all fields to A&P without rales, rhonchi or wheezing.   Heart:  Regular rate and rhythm. No murmurs, rubs, or gallops.   Abdomen: Soft. Bowel sounds are normoactive. Nontender with palpation.  Extremities: No cyanosis or edema.   Neurologic: Grossly non-focal exam    RECENT LABS:  Lab Results   Component Value Date    WBC 9.71 05/09/2024    HGB 14.9 05/09/2024    HCT 45.7 05/09/2024    MCV 87.5 05/09/2024    RDW 13.2 05/09/2024     05/09/2024    NEUTRORELPCT 70.7 05/09/2024    LYMPHORELPCT 20.1 05/09/2024    MONORELPCT 7.0 05/09/2024    EOSRELPCT 1.6 05/09/2024    BASORELPCT 0.3 05/09/2024    NEUTROABS 6.86 05/09/2024    LYMPHSABS 1.95 05/09/2024       Lab Results   Component Value Date     12/19/2024    K 4.2 12/19/2024    CO2 28.2 12/19/2024     12/19/2024    BUN 9 12/19/2024    CREATININE 0.94 12/19/2024    EGFRIFNONA 104 10/20/2021    GLUCOSE 88 12/19/2024    CALCIUM 8.7 12/19/2024    ALKPHOS 93 05/09/2024    AST 17 05/09/2024    ALT 13 05/09/2024    BILITOT 0.5 05/09/2024    ALBUMIN 3.9 05/09/2024    PROTEINTOT 6.9 05/09/2024       ASSESSMENT & PLAN:  Devan Alarcon is a very pleasant 49 y.o. male with    1.  History of diverticulitis:  2.  Diarrhea:  3.  Personal history of colon polyps:  4.  Severe stenosis of the descending colon:    -He underwent colonoscopy with Dr. Maynard on 12/19/2024 which was notable for diverticulosis in the sigmoid colon and in " the descending colon along with grade 1 internal hemorrhoids.  He was also found to have a benign-appearing, intrinsic severe stenosis in the descending colon that was not transversed.  Following colonoscopy, obtained CT of the abdomen pelvis with contrast to further evaluate which showed a markedly thickened and inflamed sigmoid colon.  Therefore, he was referred to colorectal surgery Dr. Tan in [place].He is scheduled for robotic low anterior resection with transanal extraction of the specimen, cystoscopy with bilateral ureteral catheters, lithotomy on 3/11. Colostomy/ileostomy also possibility.  -Continue Bentyl 20 mg p.o. twice daily as needed for abdominal discomfort and diarrhea.  Also recommended continuing fiber supplement with Citrucel to help bulk stool and improve bowel movements.  Fiber supplement is also beneficial for diverticulitis to help promote bowel regularity and reduce pressure in colon.  -Will have patient return to clinic in 3 months.    5.  History of adenocarcinoma of the esophagus s/p esophagectomy in 2018:  - He has continued to follow with oncology with PET/CT annually and is reportedly doing well in this regard.  Follow-up with oncology as previously planned.  He is currently without concerning symptoms.    The patient was in agreement with the plan and all questions were answered to his satisfaction.     Thank you so much for allowing us to participate in the care of Devan Alarcon . Please do not hesitate to contact us with any questions or concerns.           Electronically Signed by: TONO Lao , February 21, 2025 08:06 EST       CC:   Macey Brewer APRN

## 2025-03-04 ENCOUNTER — PRE-ADMISSION TESTING (OUTPATIENT)
Dept: PREADMISSION TESTING | Facility: HOSPITAL | Age: 50
End: 2025-03-04
Payer: COMMERCIAL

## 2025-03-04 ENCOUNTER — HOSPITAL ENCOUNTER (OUTPATIENT)
Dept: GENERAL RADIOLOGY | Facility: HOSPITAL | Age: 50
Discharge: HOME | End: 2025-03-04
Payer: COMMERCIAL

## 2025-03-04 VITALS — HEIGHT: 74 IN | BODY MASS INDEX: 28.35 KG/M2 | WEIGHT: 220.9 LBS

## 2025-03-04 DIAGNOSIS — Z01.89 LABORATORY TEST: Primary | ICD-10-CM

## 2025-03-04 LAB
ALBUMIN SERPL-MCNC: 4.2 G/DL (ref 3.5–5.2)
ALBUMIN/GLOB SERPL: 1.3 G/DL
ALP SERPL-CCNC: 89 U/L (ref 39–117)
ALT SERPL W P-5'-P-CCNC: 25 U/L (ref 1–41)
ANION GAP SERPL CALCULATED.3IONS-SCNC: 12 MMOL/L (ref 5–15)
AST SERPL-CCNC: 22 U/L (ref 1–40)
BILIRUB SERPL-MCNC: 0.5 MG/DL (ref 0–1.2)
BUN SERPL-MCNC: 11 MG/DL (ref 6–20)
BUN/CREAT SERPL: 11.6 (ref 7–25)
CALCIUM SPEC-SCNC: 9.7 MG/DL (ref 8.6–10.5)
CEA SERPL-MCNC: 3.61 NG/ML
CHLORIDE SERPL-SCNC: 102 MMOL/L (ref 98–107)
CO2 SERPL-SCNC: 28 MMOL/L (ref 22–29)
CREAT SERPL-MCNC: 0.95 MG/DL (ref 0.76–1.27)
DEPRECATED RDW RBC AUTO: 46.5 FL (ref 37–54)
EGFRCR SERPLBLD CKD-EPI 2021: 97.5 ML/MIN/1.73
ERYTHROCYTE [DISTWIDTH] IN BLOOD BY AUTOMATED COUNT: 13.9 % (ref 12.3–15.4)
GLOBULIN UR ELPH-MCNC: 3.2 GM/DL
GLUCOSE SERPL-MCNC: 95 MG/DL (ref 65–99)
HBA1C MFR BLD: 5.4 % (ref 4.8–5.6)
HCT VFR BLD AUTO: 50.8 % (ref 37.5–51)
HGB BLD-MCNC: 16.1 G/DL (ref 13–17.7)
MCH RBC QN AUTO: 28.9 PG (ref 26.6–33)
MCHC RBC AUTO-ENTMCNC: 31.7 G/DL (ref 31.5–35.7)
MCV RBC AUTO: 91.2 FL (ref 79–97)
PLATELET # BLD AUTO: 278 10*3/MM3 (ref 140–450)
PMV BLD AUTO: 10.1 FL (ref 6–12)
POTASSIUM SERPL-SCNC: 5.4 MMOL/L (ref 3.5–5.2)
PROT SERPL-MCNC: 7.4 G/DL (ref 6–8.5)
QT INTERVAL: 358 MS
QTC INTERVAL: 389 MS
RBC # BLD AUTO: 5.57 10*6/MM3 (ref 4.14–5.8)
SODIUM SERPL-SCNC: 142 MMOL/L (ref 136–145)
WBC NRBC COR # BLD AUTO: 8.27 10*3/MM3 (ref 3.4–10.8)

## 2025-03-04 PROCEDURE — 80053 COMPREHEN METABOLIC PANEL: CPT

## 2025-03-04 PROCEDURE — 71046 X-RAY EXAM CHEST 2 VIEWS: CPT

## 2025-03-04 PROCEDURE — 85027 COMPLETE CBC AUTOMATED: CPT

## 2025-03-04 PROCEDURE — 36415 COLL VENOUS BLD VENIPUNCTURE: CPT

## 2025-03-04 PROCEDURE — 93005 ELECTROCARDIOGRAM TRACING: CPT

## 2025-03-04 PROCEDURE — 83036 HEMOGLOBIN GLYCOSYLATED A1C: CPT

## 2025-03-04 PROCEDURE — 82378 CARCINOEMBRYONIC ANTIGEN: CPT

## 2025-03-04 PROCEDURE — 93010 ELECTROCARDIOGRAM REPORT: CPT | Performed by: INTERNAL MEDICINE

## 2025-03-04 NOTE — NURSING NOTE
Patient seen in PAT and marked for possible ileostomy.    Site marked:RUQ and RLQ    Patient placed in sitting position and asked to lean forward and side to side. Rectus muscle was identified, and the vik was placed within the patient's visual field. Bony prominences, scars, and creases were avoided. Marked on the flattest area.     Explained WOC role, answered questions, gave education materials. Gave extra marker and tegaderm should vik fade.    WOCN will continue to follow daily if stoma warranted.

## 2025-03-04 NOTE — PAT
"Patient to apply Chlorhexadine wipes  to surgical area (as instructed) the night before procedure and the AM of procedure. Wipes provided.    Ten (8 ounce) Impact Advanced Recovery Nutritional Drinks distributed to patient from Pre Admission Testing Department.  Verbal and written instructions given that patient must consume two (8 ounce) Impact drinks a day for five days before surgery.  Flavoring tip sheet provided as well the brochure \"Go in Stronger. Get Home Sooner\" that explains benefits of nutritional drinks to enhance recovery. Patient/family verbalized understanding.     Patient instructed to drink 20 ounces of Gatorade or Gatorlyte (if diabetic) and it needs to be completed 1 hour (for Main OR patients) or 2 hours (scheduled  section & [facility] patients) before given arrival time for procedure (NO RED Gatorade and NO Gatorade Zero).  Patient verbalized understanding.    Patient directed to Radiology Department for CXR after Pre Admission Testing Appointment.     Too early to draw type and screen in PAT.  Please obtain blood bank specimen in pre-op on the day of surgery.    champ Culp rn met with patient in PAT for stoma markings and education     Patient viewed general PAT education video as instructed in their preoperative information received from their surgeon.  Patient stated the general PAT education video was viewed in its entirety and survey completed.  Copies of [facility] general education handouts (Incentive Spirometry, Meds to Beds Program, Patient Belongings, Pre-op skin preparation instructions, Blood Glucose testing, Visitor policy, Surgery FAQ, Code H) distributed to patient if not printed. Education related to the PAT pass and skin preparation for surgery (if applicable) completed in PAT as a reinforcement to PAT education video. Patient instructed to return PAT pass provided today as well as completed skin preparation sheet (if applicable) on the day of procedure.     Additionally if patient " had not viewed video yet but intended to view it at home or in our waiting area, then referred them to the handout with QR code/link provided during PAT visit.  Encouraged patient/family to read PAT general education handouts thoroughly and notify PAT staff with any questions or concerns. Patient verbalized understanding of all information and priority content.    Netta Richard, GI navigator notified of surgery

## 2025-03-05 LAB
ABO GROUP BLD: NORMAL
RH BLD: POSITIVE

## 2025-03-05 PROCEDURE — 86901 BLOOD TYPING SEROLOGIC RH(D): CPT

## 2025-03-05 PROCEDURE — 86900 BLOOD TYPING SEROLOGIC ABO: CPT

## 2025-03-06 ENCOUNTER — PREP FOR SURGERY (OUTPATIENT)
Dept: OTHER | Facility: HOSPITAL | Age: 50
End: 2025-03-06
Payer: COMMERCIAL

## 2025-03-07 ENCOUNTER — OFFICE VISIT (OUTPATIENT)
Dept: FAMILY MEDICINE CLINIC | Facility: CLINIC | Age: 50
End: 2025-03-07
Payer: COMMERCIAL

## 2025-03-07 ENCOUNTER — TELEPHONE (OUTPATIENT)
Dept: FAMILY MEDICINE CLINIC | Facility: CLINIC | Age: 50
End: 2025-03-07

## 2025-03-07 VITALS
HEART RATE: 92 BPM | RESPIRATION RATE: 16 BRPM | BODY MASS INDEX: 28.49 KG/M2 | TEMPERATURE: 96.2 F | DIASTOLIC BLOOD PRESSURE: 88 MMHG | OXYGEN SATURATION: 97 % | SYSTOLIC BLOOD PRESSURE: 144 MMHG | HEIGHT: 74 IN | WEIGHT: 222 LBS

## 2025-03-07 DIAGNOSIS — J06.9 ACUTE URI: ICD-10-CM

## 2025-03-07 DIAGNOSIS — F41.8 SITUATIONAL ANXIETY: Primary | ICD-10-CM

## 2025-03-07 PROCEDURE — 99213 OFFICE O/P EST LOW 20 MIN: CPT | Performed by: NURSE PRACTITIONER

## 2025-03-07 RX ORDER — CEFDINIR 300 MG/1
300 CAPSULE ORAL 2 TIMES DAILY
Qty: 20 CAPSULE | Refills: 0 | Status: SHIPPED | OUTPATIENT
Start: 2025-03-07

## 2025-03-07 RX ORDER — LORAZEPAM 0.5 MG/1
0.5 TABLET ORAL 2 TIMES DAILY PRN
Qty: 6 TABLET | Refills: 0 | Status: SHIPPED | OUTPATIENT
Start: 2025-03-09 | End: 2025-03-12

## 2025-03-07 NOTE — PROGRESS NOTES
"Subjective   Devan Alarcon is a 49 y.o. male.     Chief Complaint   Patient presents with    Cough    Anxiety       History of Present Illness  He presents with c/o anxiety. He is about to have a colectomy. He feels his blood pressure is high because he is having anxiety. He is anxious about taking time off work. He denies suicidal and homicidal ideations. He c/o runny nose and a little cough. He is coughing up a little mucus. Surgery is scheduled for Tuesday. He denies fever.        The following portions of the patient's history were reviewed and updated as appropriate: allergies, current medications, past family history, past medical history, past social history, past surgical history and problem list.    Review of Systems   Constitutional:  Negative for fever.   HENT:  Positive for rhinorrhea. Negative for sore throat.    Respiratory:  Positive for cough. Negative for shortness of breath and wheezing.    Cardiovascular:  Negative for chest pain and palpitations.   Gastrointestinal:  Positive for diarrhea. Negative for blood in stool, nausea and vomiting.   Genitourinary:  Negative for hematuria.   Psychiatric/Behavioral:  Negative for suicidal ideas. The patient is nervous/anxious.        Objective     /88 (BP Location: Right arm, Patient Position: Sitting, Cuff Size: Large Adult)   Pulse 92   Temp 96.2 °F (35.7 °C) (Tympanic)   Resp 16   Ht 188 cm (74.02\")   Wt 101 kg (222 lb)   SpO2 97%   BMI 28.49 kg/m²     Physical Exam  Vitals reviewed.   Constitutional:       General: He is not in acute distress.     Appearance: He is well-developed. He is not diaphoretic.   HENT:      Head: Normocephalic and atraumatic.      Right Ear: Hearing, tympanic membrane, ear canal and external ear normal.      Left Ear: Hearing, tympanic membrane, ear canal and external ear normal.      Nose: Nose normal.      Right Sinus: No maxillary sinus tenderness or frontal sinus tenderness.      Left Sinus: No maxillary " sinus tenderness or frontal sinus tenderness.      Mouth/Throat:      Pharynx: Uvula midline.   Eyes:      General: Lids are normal.      Conjunctiva/sclera: Conjunctivae normal.      Pupils: Pupils are equal, round, and reactive to light.   Neck:      Trachea: Trachea normal. No tracheal tenderness or tracheal deviation.   Cardiovascular:      Rate and Rhythm: Normal rate and regular rhythm.      Heart sounds: Normal heart sounds, S1 normal and S2 normal. No murmur heard.     No friction rub. No gallop.   Pulmonary:      Effort: Pulmonary effort is normal. No respiratory distress.      Breath sounds: Normal breath sounds.   Abdominal:      General: Bowel sounds are normal. There is no distension.      Palpations: Abdomen is soft.      Tenderness: There is no abdominal tenderness.   Skin:     General: Skin is warm and dry.   Neurological:      Mental Status: He is alert and oriented to person, place, and time.   Psychiatric:         Mood and Affect: Mood is anxious.         Behavior: Behavior normal.         Thought Content: Thought content normal.         Judgment: Judgment normal.         Current Outpatient Medications   Medication Sig Dispense Refill    albuterol sulfate  (90 Base) MCG/ACT inhaler Inhale 2 puffs Every 4 (Four) Hours As Needed for Wheezing. 18 g 2    cyclobenzaprine (FLEXERIL) 5 MG tablet Take 1 tablet by mouth 3 (Three) Times a Day As Needed for Muscle Spasms. 90 tablet 5    dicyclomine (BENTYL) 20 MG tablet Take 1 tablet by mouth 2 (Two) Times a Day. (Patient taking differently: Take 1 tablet by mouth Daily.) 60 tablet 5    ferrous gluconate (FERGON) 324 MG tablet Take 1 tablet by mouth Daily With Breakfast. 30 tablet 5    fexofenadine (Allegra Allergy) 180 MG tablet Take 1 tablet by mouth Daily. (Patient taking differently: Take 1 tablet by mouth Daily As Needed (allergies).) 30 tablet 5    gabapentin (NEURONTIN) 100 MG capsule Take 1 capsule by mouth At Night As Needed (restless leg  syndrome). 30 capsule 2    cefdinir (OMNICEF) 300 MG capsule Take 1 capsule by mouth 2 (Two) Times a Day. 20 capsule 0    [START ON 3/9/2025] LORazepam (Ativan) 0.5 MG tablet Take 1 tablet by mouth 2 (Two) Times a Day As Needed for Anxiety for up to 3 days. 6 tablet 0     No current facility-administered medications for this visit.            Assessment & Plan     Problem List Items Addressed This Visit    None  Visit Diagnoses       Situational anxiety    -  Primary    Relevant Medications    LORazepam (Ativan) 0.5 MG tablet (Start on 3/9/2025)    Acute URI        Relevant Medications    cefdinir (OMNICEF) 300 MG capsule              ICD-10-CM ICD-9-CM   1. Situational anxiety  F41.8 300.09   2. Acute URI  J06.9 465.9       Plan: He declines COVID and flu swab.  Cefdinir ordered for acute URI.  Ativan ordered as needed for anxiety starting 3 days before the surgery.  This is a controlled medication place stored in a safe place and do not share it with others.  It can make you drowsy.  Do not take and drive or operate heavy machinery.  This is only for 3-day supply to deal with surgical related anxiety.  Do not take with narcotics after the surgery.  Follow-up after surgery.    @Body mass index is 28.49 kg/m².           Understands disease processes and need for medications.  Understands reasons for urgent and emergent care.  Patient (& family) verbalized agreement for treatment plan.   Emotional support and active listening provided.  Patient provided time to verbalize feelings.                  This document has been electronically signed by TONO Butts   March 7, 2025 08:52 EST

## 2025-03-10 ENCOUNTER — ANESTHESIA EVENT (OUTPATIENT)
Dept: PERIOP | Facility: HOSPITAL | Age: 50
End: 2025-03-10
Payer: COMMERCIAL

## 2025-03-10 RX ORDER — FAMOTIDINE 10 MG/ML
20 INJECTION, SOLUTION INTRAVENOUS ONCE
Status: CANCELLED | OUTPATIENT
Start: 2025-03-10 | End: 2025-03-10

## 2025-03-11 ENCOUNTER — ANESTHESIA EVENT CONVERTED (OUTPATIENT)
Dept: ANESTHESIOLOGY | Facility: HOSPITAL | Age: 50
End: 2025-03-11
Payer: COMMERCIAL

## 2025-03-11 ENCOUNTER — HOSPITAL ENCOUNTER (INPATIENT)
Facility: HOSPITAL | Age: 50
LOS: 2 days | Discharge: HOME | End: 2025-03-13
Attending: COLON & RECTAL SURGERY | Admitting: COLON & RECTAL SURGERY
Payer: COMMERCIAL

## 2025-03-11 ENCOUNTER — ANESTHESIA (OUTPATIENT)
Dept: PERIOP | Facility: HOSPITAL | Age: 50
End: 2025-03-11
Payer: COMMERCIAL

## 2025-03-11 DIAGNOSIS — K57.92 DIVERTICULITIS: ICD-10-CM

## 2025-03-11 DIAGNOSIS — Z90.49 S/P COLECTOMY: Primary | ICD-10-CM

## 2025-03-11 PROBLEM — K57.32 SIGMOID DIVERTICULITIS: Status: ACTIVE | Noted: 2025-03-11

## 2025-03-11 PROBLEM — K57.30 DIVERTICULOSIS LARGE INTESTINE W/O PERFORATION OR ABSCESS W/O BLEEDING: Status: ACTIVE | Noted: 2025-03-11

## 2025-03-11 LAB
ABO GROUP BLD: NORMAL
BLD GP AB SCN SERPL QL: NEGATIVE
GLUCOSE BLDC GLUCOMTR-MCNC: 158 MG/DL (ref 70–130)
GLUCOSE BLDC GLUCOMTR-MCNC: 181 MG/DL (ref 70–130)
GLUCOSE BLDC GLUCOMTR-MCNC: 223 MG/DL (ref 70–130)
HCT VFR BLD AUTO: 46.7 % (ref 37.5–51)
HGB BLD-MCNC: 15.2 G/DL (ref 13–17.7)
POTASSIUM SERPL-SCNC: 4 MMOL/L (ref 3.5–5.2)
RH BLD: POSITIVE
T&S EXPIRATION DATE: NORMAL

## 2025-03-11 PROCEDURE — 25010000003 LABETALOL 5 MG/ML SOLUTION: Performed by: NURSE ANESTHETIST, CERTIFIED REGISTERED

## 2025-03-11 PROCEDURE — 0DJD8ZZ INSPECTION OF LOWER INTESTINAL TRACT, VIA NATURAL OR ARTIFICIAL OPENING ENDOSCOPIC: ICD-10-PCS | Performed by: COLON & RECTAL SURGERY

## 2025-03-11 PROCEDURE — 8E0W4CZ ROBOTIC ASSISTED PROCEDURE OF TRUNK REGION, PERCUTANEOUS ENDOSCOPIC APPROACH: ICD-10-PCS | Performed by: COLON & RECTAL SURGERY

## 2025-03-11 PROCEDURE — 85018 HEMOGLOBIN: CPT | Performed by: COLON & RECTAL SURGERY

## 2025-03-11 PROCEDURE — 25010000002 INDOCYANINE GREEN 25 MG RECONSTITUTED SOLUTION: Performed by: COLON & RECTAL SURGERY

## 2025-03-11 PROCEDURE — 4A1BXSH MONITORING OF GASTROINTESTINAL VASCULAR PERFUSION USING INDOCYANINE GREEN DYE, EXTERNAL APPROACH: ICD-10-PCS | Performed by: UROLOGY

## 2025-03-11 PROCEDURE — 25010000002 INDOCYANINE GREEN 25 MG RECONSTITUTED SOLUTION: Performed by: NURSE ANESTHETIST, CERTIFIED REGISTERED

## 2025-03-11 PROCEDURE — 25010000002 FENTANYL CITRATE (PF) 100 MCG/2ML SOLUTION: Performed by: NURSE ANESTHETIST, CERTIFIED REGISTERED

## 2025-03-11 PROCEDURE — 25010000002 LIDOCAINE PF 1% 1 % SOLUTION: Performed by: NURSE ANESTHETIST, CERTIFIED REGISTERED

## 2025-03-11 PROCEDURE — 25010000002 PROPOFOL 10 MG/ML EMULSION: Performed by: NURSE ANESTHETIST, CERTIFIED REGISTERED

## 2025-03-11 PROCEDURE — 86901 BLOOD TYPING SEROLOGIC RH(D): CPT | Performed by: COLON & RECTAL SURGERY

## 2025-03-11 PROCEDURE — 25010000002 ERTAPENEM PER 500 MG: Performed by: COLON & RECTAL SURGERY

## 2025-03-11 PROCEDURE — 85014 HEMATOCRIT: CPT | Performed by: COLON & RECTAL SURGERY

## 2025-03-11 PROCEDURE — 25010000002 MIDAZOLAM PER 1 MG: Performed by: NURSE ANESTHETIST, CERTIFIED REGISTERED

## 2025-03-11 PROCEDURE — 25010000002 BUPIVACAINE (PF) 0.25 % SOLUTION: Performed by: NURSE ANESTHETIST, CERTIFIED REGISTERED

## 2025-03-11 PROCEDURE — 25010000002 HEPARIN (PORCINE) PER 1000 UNITS: Performed by: COLON & RECTAL SURGERY

## 2025-03-11 PROCEDURE — 52332 CYSTOSCOPY AND TREATMENT: CPT | Performed by: UROLOGY

## 2025-03-11 PROCEDURE — 0TN74ZZ RELEASE LEFT URETER, PERCUTANEOUS ENDOSCOPIC APPROACH: ICD-10-PCS | Performed by: COLON & RECTAL SURGERY

## 2025-03-11 PROCEDURE — 88307 TISSUE EXAM BY PATHOLOGIST: CPT | Performed by: COLON & RECTAL SURGERY

## 2025-03-11 PROCEDURE — 84132 ASSAY OF SERUM POTASSIUM: CPT | Performed by: ANESTHESIOLOGY

## 2025-03-11 PROCEDURE — 25010000002 FENTANYL CITRATE (PF) 50 MCG/ML SOLUTION

## 2025-03-11 PROCEDURE — 25810000003 LACTATED RINGERS PER 1000 ML: Performed by: ANESTHESIOLOGY

## 2025-03-11 PROCEDURE — 25010000002 ONDANSETRON PER 1 MG: Performed by: NURSE ANESTHETIST, CERTIFIED REGISTERED

## 2025-03-11 PROCEDURE — 25010000002 SUGAMMADEX 200 MG/2ML SOLUTION: Performed by: NURSE ANESTHETIST, CERTIFIED REGISTERED

## 2025-03-11 PROCEDURE — 0DBP4ZZ EXCISION OF RECTUM, PERCUTANEOUS ENDOSCOPIC APPROACH: ICD-10-PCS | Performed by: COLON & RECTAL SURGERY

## 2025-03-11 PROCEDURE — 25010000002 HYDROMORPHONE 1 MG/ML SOLUTION

## 2025-03-11 PROCEDURE — 86900 BLOOD TYPING SEROLOGIC ABO: CPT | Performed by: COLON & RECTAL SURGERY

## 2025-03-11 PROCEDURE — 0T788DZ DILATION OF BILATERAL URETERS WITH INTRALUMINAL DEVICE, VIA NATURAL OR ARTIFICIAL OPENING ENDOSCOPIC: ICD-10-PCS | Performed by: UROLOGY

## 2025-03-11 PROCEDURE — 86850 RBC ANTIBODY SCREEN: CPT | Performed by: COLON & RECTAL SURGERY

## 2025-03-11 PROCEDURE — 82948 REAGENT STRIP/BLOOD GLUCOSE: CPT

## 2025-03-11 PROCEDURE — 25010000002 DEXAMETHASONE SODIUM PHOSPHATE 10 MG/ML SOLUTION: Performed by: NURSE ANESTHETIST, CERTIFIED REGISTERED

## 2025-03-11 PROCEDURE — 0DTN4ZZ RESECTION OF SIGMOID COLON, PERCUTANEOUS ENDOSCOPIC APPROACH: ICD-10-PCS | Performed by: COLON & RECTAL SURGERY

## 2025-03-11 PROCEDURE — 63710000001 INSULIN REGULAR HUMAN PER 5 UNITS: Performed by: COLON & RECTAL SURGERY

## 2025-03-11 PROCEDURE — C1758 CATHETER, URETERAL: HCPCS | Performed by: COLON & RECTAL SURGERY

## 2025-03-11 DEVICE — DEV CONTRL TISS STRATAFIX SPIRAL PDS PLS 3/0 0 15CM VIL: Type: IMPLANTABLE DEVICE | Site: COLON | Status: FUNCTIONAL

## 2025-03-11 DEVICE — ECHELON CIRCULAR POWERED STAPLER
Type: IMPLANTABLE DEVICE | Site: COLON | Status: FUNCTIONAL
Brand: ECHELON CIRCULAR

## 2025-03-11 RX ORDER — OXYCODONE HYDROCHLORIDE 5 MG/1
5 TABLET ORAL EVERY 4 HOURS PRN
Status: DISCONTINUED | OUTPATIENT
Start: 2025-03-11 | End: 2025-03-13 | Stop reason: HOSPADM

## 2025-03-11 RX ORDER — ACETAMINOPHEN 500 MG
1000 TABLET ORAL ONCE
Status: COMPLETED | OUTPATIENT
Start: 2025-03-11 | End: 2025-03-11

## 2025-03-11 RX ORDER — SODIUM CHLORIDE 9 MG/ML
9 INJECTION, SOLUTION INTRAVENOUS AS NEEDED
Status: DISCONTINUED | OUTPATIENT
Start: 2025-03-11 | End: 2025-03-11 | Stop reason: HOSPADM

## 2025-03-11 RX ORDER — PROPOFOL 10 MG/ML
VIAL (ML) INTRAVENOUS AS NEEDED
Status: DISCONTINUED | OUTPATIENT
Start: 2025-03-11 | End: 2025-03-11 | Stop reason: SURG

## 2025-03-11 RX ORDER — INDOCYANINE GREEN AND WATER 25 MG
KIT INJECTION AS NEEDED
Status: DISCONTINUED | OUTPATIENT
Start: 2025-03-11 | End: 2025-03-11 | Stop reason: SURG

## 2025-03-11 RX ORDER — MIDAZOLAM HYDROCHLORIDE 1 MG/ML
1 INJECTION, SOLUTION INTRAMUSCULAR; INTRAVENOUS
Status: DISCONTINUED | OUTPATIENT
Start: 2025-03-11 | End: 2025-03-11 | Stop reason: HOSPADM

## 2025-03-11 RX ORDER — ALVIMOPAN 12 MG/1
12 CAPSULE ORAL 2 TIMES DAILY
Status: DISCONTINUED | OUTPATIENT
Start: 2025-03-12 | End: 2025-03-12

## 2025-03-11 RX ORDER — ACETAMINOPHEN 500 MG
1000 TABLET ORAL EVERY 6 HOURS
Status: COMPLETED | OUTPATIENT
Start: 2025-03-11 | End: 2025-03-13

## 2025-03-11 RX ORDER — SODIUM CHLORIDE 0.9 % (FLUSH) 0.9 %
10 SYRINGE (ML) INJECTION EVERY 12 HOURS SCHEDULED
Status: DISCONTINUED | OUTPATIENT
Start: 2025-03-11 | End: 2025-03-11 | Stop reason: HOSPADM

## 2025-03-11 RX ORDER — SCOPOLAMINE 1 MG/3D
1 PATCH, EXTENDED RELEASE TRANSDERMAL ONCE
Status: DISCONTINUED | OUTPATIENT
Start: 2025-03-11 | End: 2025-03-13 | Stop reason: HOSPADM

## 2025-03-11 RX ORDER — SODIUM CHLORIDE 0.9 % (FLUSH) 0.9 %
3-10 SYRINGE (ML) INJECTION AS NEEDED
Status: DISCONTINUED | OUTPATIENT
Start: 2025-03-11 | End: 2025-03-11 | Stop reason: HOSPADM

## 2025-03-11 RX ORDER — MIDAZOLAM HYDROCHLORIDE 1 MG/ML
INJECTION, SOLUTION INTRAMUSCULAR; INTRAVENOUS AS NEEDED
Status: DISCONTINUED | OUTPATIENT
Start: 2025-03-11 | End: 2025-03-11 | Stop reason: SURG

## 2025-03-11 RX ORDER — IPRATROPIUM BROMIDE AND ALBUTEROL SULFATE 2.5; .5 MG/3ML; MG/3ML
3 SOLUTION RESPIRATORY (INHALATION) ONCE AS NEEDED
Status: DISCONTINUED | OUTPATIENT
Start: 2025-03-11 | End: 2025-03-11 | Stop reason: HOSPADM

## 2025-03-11 RX ORDER — ALBUTEROL SULFATE 0.83 MG/ML
2.5 SOLUTION RESPIRATORY (INHALATION) EVERY 4 HOURS PRN
Status: DISCONTINUED | OUTPATIENT
Start: 2025-03-11 | End: 2025-03-13 | Stop reason: HOSPADM

## 2025-03-11 RX ORDER — NALOXONE HCL 0.4 MG/ML
0.4 VIAL (ML) INJECTION
Status: DISCONTINUED | OUTPATIENT
Start: 2025-03-11 | End: 2025-03-13 | Stop reason: HOSPADM

## 2025-03-11 RX ORDER — METRONIDAZOLE 500 MG/1
500 TABLET ORAL
COMMUNITY
Start: 2025-03-06 | End: 2025-03-13 | Stop reason: HOSPADM

## 2025-03-11 RX ORDER — ONDANSETRON 2 MG/ML
4 INJECTION INTRAMUSCULAR; INTRAVENOUS EVERY 6 HOURS PRN
Status: DISCONTINUED | OUTPATIENT
Start: 2025-03-11 | End: 2025-03-13 | Stop reason: HOSPADM

## 2025-03-11 RX ORDER — LIDOCAINE HYDROCHLORIDE 10 MG/ML
INJECTION, SOLUTION EPIDURAL; INFILTRATION; INTRACAUDAL; PERINEURAL AS NEEDED
Status: DISCONTINUED | OUTPATIENT
Start: 2025-03-11 | End: 2025-03-11 | Stop reason: SURG

## 2025-03-11 RX ORDER — MELOXICAM 15 MG/1
15 TABLET ORAL ONCE
Status: COMPLETED | OUTPATIENT
Start: 2025-03-11 | End: 2025-03-11

## 2025-03-11 RX ORDER — FAMOTIDINE 20 MG/1
20 TABLET, FILM COATED ORAL ONCE
Status: COMPLETED | OUTPATIENT
Start: 2025-03-11 | End: 2025-03-11

## 2025-03-11 RX ORDER — CETIRIZINE HYDROCHLORIDE 10 MG/1
10 TABLET ORAL DAILY PRN
Status: DISCONTINUED | OUTPATIENT
Start: 2025-03-11 | End: 2025-03-13 | Stop reason: HOSPADM

## 2025-03-11 RX ORDER — INDOCYANINE GREEN AND WATER 25 MG
KIT INJECTION AS NEEDED
Status: DISCONTINUED | OUTPATIENT
Start: 2025-03-11 | End: 2025-03-11 | Stop reason: HOSPADM

## 2025-03-11 RX ORDER — FENTANYL CITRATE 50 UG/ML
INJECTION, SOLUTION INTRAMUSCULAR; INTRAVENOUS
Status: COMPLETED
Start: 2025-03-11 | End: 2025-03-11

## 2025-03-11 RX ORDER — PREGABALIN 75 MG/1
75 CAPSULE ORAL ONCE
Status: COMPLETED | OUTPATIENT
Start: 2025-03-11 | End: 2025-03-11

## 2025-03-11 RX ORDER — HYDRALAZINE HYDROCHLORIDE 20 MG/ML
5 INJECTION INTRAMUSCULAR; INTRAVENOUS
Status: DISCONTINUED | OUTPATIENT
Start: 2025-03-11 | End: 2025-03-11 | Stop reason: HOSPADM

## 2025-03-11 RX ORDER — ONDANSETRON 2 MG/ML
INJECTION INTRAMUSCULAR; INTRAVENOUS AS NEEDED
Status: DISCONTINUED | OUTPATIENT
Start: 2025-03-11 | End: 2025-03-11 | Stop reason: SURG

## 2025-03-11 RX ORDER — ALVIMOPAN 12 MG/1
12 CAPSULE ORAL ONCE
Status: COMPLETED | OUTPATIENT
Start: 2025-03-11 | End: 2025-03-11

## 2025-03-11 RX ORDER — FENTANYL CITRATE 50 UG/ML
INJECTION, SOLUTION INTRAMUSCULAR; INTRAVENOUS AS NEEDED
Status: DISCONTINUED | OUTPATIENT
Start: 2025-03-11 | End: 2025-03-11 | Stop reason: SURG

## 2025-03-11 RX ORDER — BUPIVACAINE HYDROCHLORIDE 2.5 MG/ML
INJECTION, SOLUTION EPIDURAL; INFILTRATION; INTRACAUDAL
Status: COMPLETED | OUTPATIENT
Start: 2025-03-11 | End: 2025-03-11

## 2025-03-11 RX ORDER — ULTRASOUND COUPLING MEDIUM
GEL (GRAM) TOPICAL AS NEEDED
Status: DISCONTINUED | OUTPATIENT
Start: 2025-03-11 | End: 2025-03-11 | Stop reason: HOSPADM

## 2025-03-11 RX ORDER — LABETALOL HYDROCHLORIDE 5 MG/ML
10 INJECTION, SOLUTION INTRAVENOUS EVERY 4 HOURS PRN
Status: DISCONTINUED | OUTPATIENT
Start: 2025-03-11 | End: 2025-03-13 | Stop reason: HOSPADM

## 2025-03-11 RX ORDER — ENOXAPARIN SODIUM 100 MG/ML
40 INJECTION SUBCUTANEOUS DAILY
Status: DISCONTINUED | OUTPATIENT
Start: 2025-03-12 | End: 2025-03-13 | Stop reason: HOSPADM

## 2025-03-11 RX ORDER — NALOXONE HCL 0.4 MG/ML
0.4 VIAL (ML) INJECTION AS NEEDED
Status: DISCONTINUED | OUTPATIENT
Start: 2025-03-11 | End: 2025-03-11 | Stop reason: HOSPADM

## 2025-03-11 RX ORDER — PROMETHAZINE HYDROCHLORIDE 25 MG/1
25 TABLET ORAL ONCE AS NEEDED
Status: DISCONTINUED | OUTPATIENT
Start: 2025-03-11 | End: 2025-03-11 | Stop reason: HOSPADM

## 2025-03-11 RX ORDER — ROCURONIUM BROMIDE 10 MG/ML
INJECTION, SOLUTION INTRAVENOUS AS NEEDED
Status: DISCONTINUED | OUTPATIENT
Start: 2025-03-11 | End: 2025-03-11 | Stop reason: SURG

## 2025-03-11 RX ORDER — HYDROCODONE BITARTRATE AND ACETAMINOPHEN 5; 325 MG/1; MG/1
1 TABLET ORAL ONCE AS NEEDED
Status: DISCONTINUED | OUTPATIENT
Start: 2025-03-11 | End: 2025-03-11 | Stop reason: HOSPADM

## 2025-03-11 RX ORDER — LABETALOL HYDROCHLORIDE 5 MG/ML
5 INJECTION, SOLUTION INTRAVENOUS
Status: DISCONTINUED | OUTPATIENT
Start: 2025-03-11 | End: 2025-03-11 | Stop reason: HOSPADM

## 2025-03-11 RX ORDER — FENTANYL CITRATE 50 UG/ML
50 INJECTION, SOLUTION INTRAMUSCULAR; INTRAVENOUS
Status: DISCONTINUED | OUTPATIENT
Start: 2025-03-11 | End: 2025-03-11 | Stop reason: HOSPADM

## 2025-03-11 RX ORDER — SODIUM CHLORIDE, SODIUM LACTATE, POTASSIUM CHLORIDE, CALCIUM CHLORIDE 600; 310; 30; 20 MG/100ML; MG/100ML; MG/100ML; MG/100ML
9 INJECTION, SOLUTION INTRAVENOUS CONTINUOUS
Status: ACTIVE | OUTPATIENT
Start: 2025-03-12 | End: 2025-03-12

## 2025-03-11 RX ORDER — SODIUM CHLORIDE 0.9 % (FLUSH) 0.9 %
10 SYRINGE (ML) INJECTION AS NEEDED
Status: DISCONTINUED | OUTPATIENT
Start: 2025-03-11 | End: 2025-03-11 | Stop reason: HOSPADM

## 2025-03-11 RX ORDER — LABETALOL HYDROCHLORIDE 5 MG/ML
INJECTION, SOLUTION INTRAVENOUS AS NEEDED
Status: DISCONTINUED | OUTPATIENT
Start: 2025-03-11 | End: 2025-03-11 | Stop reason: SURG

## 2025-03-11 RX ORDER — PROMETHAZINE HYDROCHLORIDE 25 MG/1
25 SUPPOSITORY RECTAL ONCE AS NEEDED
Status: DISCONTINUED | OUTPATIENT
Start: 2025-03-11 | End: 2025-03-11 | Stop reason: HOSPADM

## 2025-03-11 RX ORDER — DEXAMETHASONE SODIUM PHOSPHATE 10 MG/ML
INJECTION, SOLUTION INTRAMUSCULAR; INTRAVENOUS
Status: COMPLETED | OUTPATIENT
Start: 2025-03-11 | End: 2025-03-11

## 2025-03-11 RX ORDER — SODIUM CHLORIDE, SODIUM LACTATE, POTASSIUM CHLORIDE, CALCIUM CHLORIDE 600; 310; 30; 20 MG/100ML; MG/100ML; MG/100ML; MG/100ML
9 INJECTION, SOLUTION INTRAVENOUS CONTINUOUS
Status: ACTIVE | OUTPATIENT
Start: 2025-03-11 | End: 2025-03-12

## 2025-03-11 RX ORDER — DIAZEPAM 5 MG/1
5 TABLET ORAL EVERY 6 HOURS PRN
Status: DISCONTINUED | OUTPATIENT
Start: 2025-03-11 | End: 2025-03-13 | Stop reason: HOSPADM

## 2025-03-11 RX ORDER — GABAPENTIN 100 MG/1
100 CAPSULE ORAL NIGHTLY PRN
Status: DISCONTINUED | OUTPATIENT
Start: 2025-03-11 | End: 2025-03-13 | Stop reason: HOSPADM

## 2025-03-11 RX ORDER — MAGNESIUM HYDROXIDE 1200 MG/15ML
LIQUID ORAL AS NEEDED
Status: DISCONTINUED | OUTPATIENT
Start: 2025-03-11 | End: 2025-03-11 | Stop reason: HOSPADM

## 2025-03-11 RX ORDER — HYDROMORPHONE HYDROCHLORIDE 1 MG/ML
0.5 INJECTION, SOLUTION INTRAMUSCULAR; INTRAVENOUS; SUBCUTANEOUS
Status: DISCONTINUED | OUTPATIENT
Start: 2025-03-11 | End: 2025-03-11 | Stop reason: HOSPADM

## 2025-03-11 RX ORDER — OXYCODONE AND ACETAMINOPHEN 7.5; 325 MG/1; MG/1
1 TABLET ORAL EVERY 4 HOURS PRN
Status: DISCONTINUED | OUTPATIENT
Start: 2025-03-11 | End: 2025-03-11 | Stop reason: HOSPADM

## 2025-03-11 RX ORDER — DEXTROSE MONOHYDRATE, SODIUM CHLORIDE, AND POTASSIUM CHLORIDE 50; 1.49; 4.5 G/1000ML; G/1000ML; G/1000ML
100 INJECTION, SOLUTION INTRAVENOUS CONTINUOUS
Status: DISCONTINUED | OUTPATIENT
Start: 2025-03-11 | End: 2025-03-12

## 2025-03-11 RX ORDER — IBUPROFEN 800 MG/1
400 TABLET, FILM COATED ORAL EVERY 6 HOURS SCHEDULED
Status: DISCONTINUED | OUTPATIENT
Start: 2025-03-12 | End: 2025-03-13 | Stop reason: HOSPADM

## 2025-03-11 RX ORDER — NEOMYCIN SULFATE 500 MG/1
TABLET ORAL
COMMUNITY
Start: 2025-03-06 | End: 2025-03-13 | Stop reason: HOSPADM

## 2025-03-11 RX ORDER — LIDOCAINE HYDROCHLORIDE 10 MG/ML
0.5 INJECTION, SOLUTION EPIDURAL; INFILTRATION; INTRACAUDAL; PERINEURAL ONCE AS NEEDED
Status: DISCONTINUED | OUTPATIENT
Start: 2025-03-11 | End: 2025-03-11 | Stop reason: HOSPADM

## 2025-03-11 RX ORDER — ONDANSETRON 2 MG/ML
4 INJECTION INTRAMUSCULAR; INTRAVENOUS ONCE AS NEEDED
Status: DISCONTINUED | OUTPATIENT
Start: 2025-03-11 | End: 2025-03-11 | Stop reason: HOSPADM

## 2025-03-11 RX ORDER — CYCLOBENZAPRINE HCL 10 MG
5 TABLET ORAL 3 TIMES DAILY PRN
Status: DISCONTINUED | OUTPATIENT
Start: 2025-03-11 | End: 2025-03-13 | Stop reason: HOSPADM

## 2025-03-11 RX ORDER — HEPARIN SODIUM 5000 [USP'U]/ML
5000 INJECTION, SOLUTION INTRAVENOUS; SUBCUTANEOUS ONCE
Status: COMPLETED | OUTPATIENT
Start: 2025-03-11 | End: 2025-03-11

## 2025-03-11 RX ORDER — MORPHINE SULFATE 2 MG/ML
2 INJECTION, SOLUTION INTRAMUSCULAR; INTRAVENOUS EVERY 4 HOURS PRN
Status: DISCONTINUED | OUTPATIENT
Start: 2025-03-11 | End: 2025-03-13 | Stop reason: HOSPADM

## 2025-03-11 RX ORDER — SODIUM CHLORIDE 0.9 % (FLUSH) 0.9 %
3 SYRINGE (ML) INJECTION EVERY 12 HOURS SCHEDULED
Status: DISCONTINUED | OUTPATIENT
Start: 2025-03-11 | End: 2025-03-11 | Stop reason: HOSPADM

## 2025-03-11 RX ADMIN — FAMOTIDINE 20 MG: 20 TABLET, FILM COATED ORAL at 09:55

## 2025-03-11 RX ADMIN — HEPARIN SODIUM 5000 UNITS: 5000 INJECTION INTRAVENOUS; SUBCUTANEOUS at 10:17

## 2025-03-11 RX ADMIN — FENTANYL CITRATE 100 MCG: 50 INJECTION, SOLUTION INTRAMUSCULAR; INTRAVENOUS at 11:44

## 2025-03-11 RX ADMIN — ALVIMOPAN 12 MG: 12 CAPSULE ORAL at 09:55

## 2025-03-11 RX ADMIN — FENTANYL CITRATE 50 MCG: 50 INJECTION, SOLUTION INTRAMUSCULAR; INTRAVENOUS at 14:55

## 2025-03-11 RX ADMIN — ROCURONIUM 100 MG: 50 INJECTION, SOLUTION INTRAVENOUS at 10:53

## 2025-03-11 RX ADMIN — FENTANYL CITRATE 100 MCG: 50 INJECTION, SOLUTION INTRAMUSCULAR; INTRAVENOUS at 10:48

## 2025-03-11 RX ADMIN — DEXAMETHASONE SODIUM PHOSPHATE 6 MG: 10 INJECTION INTRAMUSCULAR; INTRAVENOUS at 10:51

## 2025-03-11 RX ADMIN — Medication 5 MG: at 12:12

## 2025-03-11 RX ADMIN — ACETAMINOPHEN 1000 MG: 500 TABLET, FILM COATED ORAL at 23:31

## 2025-03-11 RX ADMIN — HYDROMORPHONE HYDROCHLORIDE 0.5 MG: 1 INJECTION, SOLUTION INTRAMUSCULAR; INTRAVENOUS; SUBCUTANEOUS at 14:19

## 2025-03-11 RX ADMIN — PREGABALIN 75 MG: 75 CAPSULE ORAL at 09:55

## 2025-03-11 RX ADMIN — INSULIN HUMAN 2 UNITS: 100 INJECTION, SOLUTION PARENTERAL at 23:32

## 2025-03-11 RX ADMIN — SCOPOLAMINE 1 PATCH: 1.5 PATCH, EXTENDED RELEASE TRANSDERMAL at 09:53

## 2025-03-11 RX ADMIN — SODIUM CHLORIDE, POTASSIUM CHLORIDE, SODIUM LACTATE AND CALCIUM CHLORIDE: 600; 310; 30; 20 INJECTION, SOLUTION INTRAVENOUS at 12:44

## 2025-03-11 RX ADMIN — OXYCODONE 5 MG: 5 TABLET ORAL at 17:57

## 2025-03-11 RX ADMIN — ERTAPENEM 1 G: 1 INJECTION INTRAMUSCULAR; INTRAVENOUS at 10:58

## 2025-03-11 RX ADMIN — SODIUM CHLORIDE, POTASSIUM CHLORIDE, SODIUM LACTATE AND CALCIUM CHLORIDE 9 ML/HR: 600; 310; 30; 20 INJECTION, SOLUTION INTRAVENOUS at 09:54

## 2025-03-11 RX ADMIN — MIDAZOLAM HYDROCHLORIDE 2 MG: 1 INJECTION, SOLUTION INTRAMUSCULAR; INTRAVENOUS at 10:39

## 2025-03-11 RX ADMIN — ONDANSETRON 4 MG: 2 INJECTION INTRAMUSCULAR; INTRAVENOUS at 13:32

## 2025-03-11 RX ADMIN — ACETAMINOPHEN 1000 MG: 500 TABLET ORAL at 09:55

## 2025-03-11 RX ADMIN — SUGAMMADEX 200 MG: 100 INJECTION, SOLUTION INTRAVENOUS at 13:32

## 2025-03-11 RX ADMIN — POTASSIUM CHLORIDE, DEXTROSE MONOHYDRATE AND SODIUM CHLORIDE 100 ML/HR: 150; 5; 450 INJECTION, SOLUTION INTRAVENOUS at 16:16

## 2025-03-11 RX ADMIN — ACETAMINOPHEN 1000 MG: 500 TABLET, FILM COATED ORAL at 17:47

## 2025-03-11 RX ADMIN — BUPIVACAINE HYDROCHLORIDE 60 ML: 2.5 INJECTION, SOLUTION EPIDURAL; INFILTRATION; INTRACAUDAL; PERINEURAL at 10:54

## 2025-03-11 RX ADMIN — PROPOFOL 200 MG: 10 INJECTION, EMULSION INTRAVENOUS at 10:52

## 2025-03-11 RX ADMIN — LIDOCAINE HYDROCHLORIDE 50 MG: 10 INJECTION, SOLUTION EPIDURAL; INFILTRATION; INTRACAUDAL; PERINEURAL at 10:51

## 2025-03-11 RX ADMIN — INDOCYANINE GREEN AND WATER 12.5 MG: KIT at 12:32

## 2025-03-11 RX ADMIN — INSULIN HUMAN 2 UNITS: 100 INJECTION, SOLUTION PARENTERAL at 18:35

## 2025-03-11 RX ADMIN — DEXAMETHASONE SODIUM PHOSPHATE 4 MG: 10 INJECTION INTRAMUSCULAR; INTRAVENOUS at 10:54

## 2025-03-11 RX ADMIN — MELOXICAM 15 MG: 15 TABLET ORAL at 09:55

## 2025-03-11 NOTE — ANESTHESIA PROCEDURE NOTES
Peripheral Block    Pre-sedation assessment completed: 3/11/2025 10:41 AM    Patient reassessed immediately prior to procedure    Patient location during procedure: OR  Start time: 3/11/2025 10:54 AM  Stop time: 3/11/2025 11:00 AM  Reason for block: at surgeon's request and post-op pain management  Preanesthetic Checklist  Completed: patient identified, IV checked, site marked, risks and benefits discussed, surgical consent, monitors and equipment checked, pre-op evaluation and timeout performed  Prep:  Pt Position: supine  Sterile barriers:cap, gloves, mask and washed/disinfected hands  Prep: ChloraPrep  Patient monitoring: blood pressure monitoring, continuous pulse oximetry and EKG  Procedure    Sedation: yes  Performed under: general  Guidance:ultrasound guided  Images:still images obtained, printed/placed on chart    Laterality:Bilateral  Block Type:TAP  Injection Technique:single-shot  Needle Type:short-bevel and echogenic  Needle Gauge:20 G  Resistance on Injection: none    Medications Used: dexamethasone sodium phosphate injection - Injection   4 mg - 3/11/2025 10:54:00 AM  bupivacaine PF (MARCAINE) 0.25 % injection - Injection   60 mL - 3/11/2025 10:54:00 AM      Medications  Comment:Block Injection:  LA dose divided between Right and Left block; added external oblique intercostal to cover lateral ports        Post Assessment  Injection Assessment: negative aspiration for heme, incremental injection and no paresthesia on injection  Patient Tolerance:comfortable throughout block  Complications:no  Additional Notes    Subcostal TAPs    A high-frequency linear transducer, with sterile cover, was placed sub-xiphoid to identify [place], right and left Rectus Abdominus Muscles (CHAUNCEY). The transducer was moved either right or left subcostally to identify the CHAUNCEY and the Transverse Abdominus Muscle (MACHADO). The insertion site was prepped in sterile fashion and then localized with 2-5 ml of 1% Lidocaine. Using  "ultrasound-guidance, a 20-gauge B-Temple 4\" Ultraplex 360 non-stimulating echogenic needle was advanced in plane, from medial to lateral, until the tip of the needle was in the fascial plane between the CHAUNCEY and MACHADO. 1-3ml of preservative free normal saline was used to hydro-dissect the fascial planes. After the fascial plane was verified, the local anesthetic (LA) was injected. The procedure was repeated on the opposite side for bilateral coverage. Aspiration every 5 ml to prevent intravascular injection. Injection was completed with negative aspiration of blood and negative intravascular injection. Injection pressures were normal with minimal resistance. The subcostal approach to the TAP nerve block ideally anesthetizes the intercostal nerves T6-T9.     Mid-Axillary/Lateral TAPs    A high-frequency linear transducer, with sterile cover, was placed in the midaxillary line between the ASIS and costal margin. The External Oblique Muscle (EOM), Internal Oblique Muscle (IOM), Transverse Abdominus Muscle (MACHADO), and Peritoneum were identified. The insertion site was prepped in sterile fashion and then localized with 2-5 ml of 1% Lidocaine. Using ultrasound-guidance, a 20-gauge B-Temple 4\" Ultraplex 360 non-stimulating echogenic needle was advanced in plane, from medial to lateral, until the tip of the needle was in the fascial plane between the IOM and MACHADO. 1-3ml of preservative free normal saline was used to hydro-dissect the fascial planes. After the fascial plane was verified, the local anesthetic (LA) was injected. The procedure was repeated on the opposite side for bilateral coverage. Aspiration every 5 ml to prevent intravascular injection. Injection was completed with negative aspiration of blood and negative intravascular injection. Injection pressures were normal with minimal resistance. Midaxillary TAPs should reach intercostal nerves T10- T11 and the subcostal nerve T12.    Performed by: Jolly Rojas, " CRNA

## 2025-03-11 NOTE — PLAN OF CARE
Problem: Adult Inpatient Plan of Care  Goal: Plan of Care Review  Outcome: Progressing  Flowsheets (Taken 3/11/2025 1818)  Progress: improving  Outcome Evaluation: Ambulated from stretcher to bed after surgery. Using splint pillow and incentive spirometer. Verbalizes understanding of POC. Wife has gum for pt and instruction given to chew for 30 min. Marroquin with good UOP--bloody urine in appearance. MIVF infusing. Lap sites dry and intact with glue and transparent gauze dressing.  Plan of Care Reviewed With:   patient   spouse  Goal: Patient-Specific Goal (Individualized)  Outcome: Progressing  Goal: Absence of Hospital-Acquired Illness or Injury  Outcome: Progressing  Intervention: Identify and Manage Fall Risk  Recent Flowsheet Documentation  Taken 3/11/2025 1800 by Ana Winkler RN  Safety Promotion/Fall Prevention:   activity supervised   assistive device/personal items within reach   clutter free environment maintained   nonskid shoes/slippers when out of bed   room organization consistent   safety round/check completed  Taken 3/11/2025 1600 by Ana Winkler RN  Safety Promotion/Fall Prevention:   activity supervised   assistive device/personal items within reach   clutter free environment maintained   nonskid shoes/slippers when out of bed   room organization consistent   safety round/check completed  Intervention: Prevent Skin Injury  Recent Flowsheet Documentation  Taken 3/11/2025 1800 by Ana Winkler RN  Body Position: position changed independently  Skin Protection: protective footwear used  Taken 3/11/2025 1600 by Ana Winkler RN  Body Position: position changed independently  Skin Protection: protective footwear used  Intervention: Prevent and Manage VTE (Venous Thromboembolism) Risk  Recent Flowsheet Documentation  Taken 3/11/2025 1600 by Ana Winkler RN  VTE Prevention/Management: SCDs (sequential compression devices) on  Intervention: Prevent Infection  Recent Flowsheet Documentation  Taken  3/11/2025 1800 by Ana Winkler RN  Infection Prevention:   environmental surveillance performed   equipment surfaces disinfected   hand hygiene promoted   rest/sleep promoted   single patient room provided  Taken 3/11/2025 1600 by Ana Winkler RN  Infection Prevention:   environmental surveillance performed   equipment surfaces disinfected   hand hygiene promoted   rest/sleep promoted   single patient room provided  Goal: Optimal Comfort and Wellbeing  Outcome: Progressing  Intervention: Monitor Pain and Promote Comfort  Recent Flowsheet Documentation  Taken 3/11/2025 1800 by Ana Winkler RN  Pain Management Interventions: pain medication given  Taken 3/11/2025 1747 by Ana Winkler RN  Pain Management Interventions: pain medication given  Taken 3/11/2025 1600 by Ana Winkler RN  Pain Management Interventions:   relaxation techniques promoted   quiet environment facilitated   position adjusted   pillow support provided  Taken 3/11/2025 1557 by Ana Winkler RN  Pain Management Interventions: pain medication given  Intervention: Provide Person-Centered Care  Recent Flowsheet Documentation  Taken 3/11/2025 1800 by Ana Winkler RN  Trust Relationship/Rapport:   care explained   choices provided   emotional support provided   empathic listening provided   questions answered   reassurance provided   thoughts/feelings acknowledged   questions encouraged  Taken 3/11/2025 1600 by Ana Winkler RN  Trust Relationship/Rapport:   care explained   choices provided   emotional support provided   empathic listening provided   questions answered   questions encouraged   reassurance provided   thoughts/feelings acknowledged  Goal: Readiness for Transition of Care  Outcome: Progressing  Intervention: Mutually Develop Transition Plan  Recent Flowsheet Documentation  Taken 3/11/2025 1638 by Ana Winkler RN  Transportation Anticipated: family or friend will provide  Patient/Family Anticipated Services at Transition:  none  Patient/Family Anticipates Transition to: home with family   Goal Outcome Evaluation:  Plan of Care Reviewed With: patient, spouse        Progress: improving  Outcome Evaluation: Ambulated from stretcher to bed after surgery. Using splint pillow and incentive spirometer. Verbalizes understanding of POC. Wife has gum for pt and instruction given to chew for 30 min. Marroquin with good UOP--bloody urine in appearance. MIVF infusing. Lap sites dry and intact with glue and transparent gauze dressing.

## 2025-03-11 NOTE — OP NOTE
Cystourethroscopy & Intraoperative Bilateral Catheter Placement Operative Report    Patient Name:  Devan Alarcon  YOB: 1975    Date of Surgery:  3/11/2025     Indications: 49-year-old male with history of diverticulitis.  Presents today for low anterior resection with colorectal surgery.  Urology consulted intraoperatively for ureteral identification catheter placement.    Pre-op Diagnosis:   Diverticulitis       Post-Op Diagnosis Codes:  Diverticulitis    Procedure/CPT® Codes: 12677, 97235    1. CYSTOSCOPY BILATERAL URETERAL CATHETER/STENT INSERTION  2. MODIFIER 50, BILATERAL STENT PLACEMENT  3. CLEMENTE CATHETER INSERTION  CHANGE    Staff:  Surgeon(s):  Jhony Garcia MD      Anesthesia: General with Block    Estimated Blood Loss: minimal        Findings:   Normal anterior & posterior urethra.  Surgeon of bilateral 5 Japanese ureteral identification catheter  Clemente catheter insertion    Complications: None immediate    Description of Procedure:   The patient was identified in the preoperative holding area where informed consent was reviewed and signed. The patient was transported to the operating room per anesthesia and placed supine on the operating table. Smooth endotracheal intubation was performed without issue after administration of general anesthesia. The patient was then placed in the dorsal lithotomy position where genitals were prepped and draped in the usual sterile fashion. A brief timeout was performed identifying the correct patient procedure and laterality. Perioperative antibiotics were administered. All pressure points were padded.      Procedure began by inserting a 22 Japanese cystoscope atraumatically per the patient's urethra. Upon entering the bladder formal cystoscopy was performed identifying bilateral orthotopic ureteral orifices and no evidence of tumor, stone, foreign body. Attention was then turned to the right ureteral orifice. A 5 Japanese open ended ureteral catheter was  inserted into the distal ureter and passed up to the level right renal collecting system without difficulty.  10 mL ICG instilled through the ureteral catheter.  Attention was then turned to the left ureteral orifice.  A 5 Zimbabwean open ended ureteral catheter was inserted into the distal ureter and passed up to the level left renal collecting system without difficulty.   10 mL ICG instilled through the ureteral catheter.      A 16F catheter was placed.  10 mL placed in the balloon.  The ureteral catheters were affixed to the Marroquin catheter.     Patient remained under general anesthesia.  The patient will continue scheduled procedure with colorectal service, Dr. Britney MD. Urology service available to assist in any way during the procedure.    Jhony Garcia MD     Date: 3/11/2025  Time: 13:03 EDT

## 2025-03-11 NOTE — ANESTHESIA POSTPROCEDURE EVALUATION
Patient: Devan Alarcon    Procedure Summary       Date: 03/11/25 Room / Location:  [facility] /  [facility]    Anesthesia Start: 1041 Anesthesia Stop: 1356    Procedures:       COLON RESECTION LOW ANTERIOR LAPAROSCOPIC, TRANSANAL EXTRACTION OF SPECIMEN WITH DAVINCI ROBOT (Abdomen)      CYSTOSCOPY TEMPORARY PLACEMENT BILATERAL URETERAL CATHETER - SURGEON MAY CHANGE (Bilateral: Bladder) Diagnosis:     Surgeons: Cora Tan MD; Jhony Garcia MD Provider: Casey Mckeon MD    Anesthesia Type: general with block ASA Status: 3            Anesthesia Type: general with block    Vitals  Vitals Value Taken Time   BP     Temp     Pulse 80 03/11/25 13:55   Resp     SpO2 95 % 03/11/25 13:55   Vitals shown include unfiled device data.        Post Anesthesia Care and Evaluation    Patient location during evaluation: PACU  Patient participation: complete - patient cannot participate  Level of consciousness: obtunded/minimal responses  Pain management: adequate    Airway patency: patent  Anesthetic complications: No anesthetic complications  PONV Status: none  Cardiovascular status: hemodynamically stable and acceptable  Respiratory status: nonlabored ventilation, acceptable, nasal cannula and oral airway  Hydration status: acceptable

## 2025-03-11 NOTE — H&P
Pre-Op H&P  Devan Alarcon  [id number]  1975      Chief complaint: Diverticulitis       Subjective:  Patient is a 49 y.o.male presents for scheduled surgery by Dr. Tan. He anticipates a COLON RESECTION LOW ANTERIOR LAPAROSCOPIC, TRANSANAL EXTRACTION OF SPECIMEN WITH DAVINCI ROBOT; CYSTOSCOPY TEMPORARY PLACEMENT BILATERAL URETERAL CATHETER - SURGEON MAY CHANGE  today.  He has history of diverticulitis and intermittent left lower quadrant pain.  He has history of polyps with colonoscopy in 2018.  He had colonoscopy December 2024 that showed diverticulosis in sigmoid colon and descending colon as well as internal hemorrhoids.  He denies any recent difficulty with constipation, diarrhea or bloody stools.      Review of Systems:  Constitutional-- No fever, chills or sweats. No fatigue.  CV-- No chest pain, palpitation or syncope. +HLD  Resp-- No SOB, cough, hemoptysis  Skin--No rashes or lesions      Allergies: No Known Allergies      Home Meds:  Medications Prior to Admission   Medication Sig Dispense Refill Last Dose/Taking    albuterol sulfate  (90 Base) MCG/ACT inhaler Inhale 2 puffs Every 4 (Four) Hours As Needed for Wheezing. 18 g 2     cefdinir (OMNICEF) 300 MG capsule Take 1 capsule by mouth 2 (Two) Times a Day. 20 capsule 0     cyclobenzaprine (FLEXERIL) 5 MG tablet Take 1 tablet by mouth 3 (Three) Times a Day As Needed for Muscle Spasms. 90 tablet 5     dicyclomine (BENTYL) 20 MG tablet Take 1 tablet by mouth 2 (Two) Times a Day. (Patient taking differently: Take 1 tablet by mouth Daily.) 60 tablet 5     ferrous gluconate (FERGON) 324 MG tablet Take 1 tablet by mouth Daily With Breakfast. 30 tablet 5     fexofenadine (Allegra Allergy) 180 MG tablet Take 1 tablet by mouth Daily. (Patient taking differently: Take 1 tablet by mouth Daily As Needed (allergies).) 30 tablet 5     gabapentin (NEURONTIN) 100 MG capsule Take 1 capsule by mouth At Night As Needed (restless leg syndrome). 30 capsule 2      LORazepam (Ativan) 0.5 MG tablet Take 1 tablet by mouth 2 (Two) Times a Day As Needed for Anxiety for up to 3 days. 6 tablet 0          PMH:   Past Medical History:   Diagnosis Date    Arm fracture     left arm     Arthritis     knee and hands    Diverticulitis     Esophageal cancer     Oct 2018, esophagectomy    GERD (gastroesophageal reflux disease)     History of migraine headaches     Hyperlipidemia     Hyperlipidemia 01/20/2017     PSH:    Past Surgical History:   Procedure Laterality Date    COLONOSCOPY      COLONOSCOPY N/A 11/10/2021    Procedure: COLONOSCOPY;  Surgeon: Joi Bains MD;  Location: [facility] OR;  Service: Gastroenterology;  Laterality: N/A;    COLONOSCOPY N/A 12/19/2024    Procedure: COLONOSCOPY;  Surgeon: Joi Bains MD;  Location: [facility] OR;  Service: Gastroenterology;  Laterality: N/A;    ENDOSCOPY N/A 9/20/2018    Procedure: ESOPHAGOGASTRODUODENOSCOPY WITH BIOPSY CPT CODE: 35245;  Surgeon: Baron Mccall MD;  Location: [facility] OR;  Service: Gastroenterology    ENDOSCOPY N/A 10/17/2018    Procedure: ESOPHAGOGASTRODUODENOSCOPY WITH BIOPSY CPT CODE: 99118;  Surgeon: Baron Mccall MD;  Location: [facility] OR;  Service: Gastroenterology    ENDOSCOPY      with ablation    ESOPHAGECTOMY      MOUTH SURGERY      UPPER GASTROINTESTINAL ENDOSCOPY      VENTRAL/INCISIONAL HERNIA REPAIR N/A 7/12/2019    Procedure: VENTRAL/INCISIONAL HERNIA REPAIR LAPAROSCOPIC;  Surgeon: Jaswinder Goodrich MD;  Location: [facility];  Service: General       Immunization History:  Influenza: No  Pneumococcal: No  Tetanus: UTD    Social History:   Tobacco:   Social History     Tobacco Use   Smoking Status Every Day    Current packs/day: 1.00    Average packs/day: 1 pack/day for 25.8 years (25.8 ttl pk-yrs)    Types: Pipe, Cigarettes    Start date: 10/1/2015   Smokeless Tobacco Never   Tobacco Comments    Quit cigarettes ~2018      Alcohol:     Social History     Substance and Sexual  Activity   Alcohol Use No         Physical Exam: VS: /91  HR 78  RR 16  T 98.0  sat 97%RA      General Appearance:    Alert, cooperative, no distress, appears stated age   Head:    Normocephalic, without obvious abnormality, atraumatic   Lungs:     Clear to auscultation bilaterally, respirations unlabored    Heart:   Regular rate and rhythm, S1 and S2 normal    Abdomen:    Soft without tenderness   Extremities:   Extremities normal, atraumatic, no cyanosis or edema   Skin:   Skin color, texture, turgor normal, no rashes or lesions   Neurologic:   Grossly intact     Results Review:     LABS:  Lab Results   Component Value Date    WBC 8.27 03/04/2025    HGB 16.1 03/04/2025    HCT 50.8 03/04/2025    MCV 91.2 03/04/2025     03/04/2025    NEUTROABS 6.86 05/09/2024    GLUCOSE 95 03/04/2025    BUN 11 03/04/2025    CREATININE 0.95 03/04/2025    EGFRIFNONA 104 10/20/2021     03/04/2025    K 5.4 (H) 03/04/2025     03/04/2025    CO2 28.0 03/04/2025    CALCIUM 9.7 03/04/2025    ALBUMIN 4.2 03/04/2025    AST 22 03/04/2025    ALT 25 03/04/2025    BILITOT 0.5 03/04/2025       RADIOLOGY:  Study Result    Narrative & Impression   EXAM: CT ABDOMEN PELVIS W CONTRAST-         TECHNIQUE: Multiple axial CT images were obtained from lung bases  through pubic symphysis WITH administration of IV contrast. Reformatted  images in the coronal and/or sagittal plane(s) were generated from the  axial data set to facilitate diagnostic accuracy and/or surgical  planning.  Oral Contrast:NONE.     Radiation dose reduction techniques were utilized per ALARA protocol.  Automated exposure control was initiated through either or [employer] or  DoseRight software packages by  protocol.    DOSE:     Clinical information abnormal colonoscopy; Z12.11-Encounter for  screening for malignant neoplasm of colon; Z86.0100-Personal history of  colon polyps, unspecified; K57.92-Diverticulitis of intestine, part  unspecified,  without perforation or abscess without bleeding      Comparison 7/13/2019     FINDINGS:     Lower thorax: Hiatal hernia with thickening of the distal esophagus.     Abdomen:     Liver: Homogeneous. No focal hepatic mass or ductal dilatation.     Gallbladder: No dilation or stone identified.     Pancreas: Unremarkable. No mass or ductal dilatation.     Spleen: Homogeneous. No splenomegaly.     Adrenals: No mass.     Kidneys/ureters: No mass. No obstructive uropathy.  No evidence of  urolithiasis.     GI tract: Markedly thickened and inflamed sigmoid colon. Diffuse  thickening of adjacent urinary bladder wall and small fluid collection  which appears to be intimate with the superior aspect of the urinary  bladder and a tract extending to the adjacent colon image 30 coronal  series 3..     MESENTERY: Inflammation surrounding the sigmoid colon.     Vasculature: Evidence of atherosclerotic vascular disease     Abdominal wall: No focal hernia or mass.        Bladder: Diffuse urinary bladder wall thickening. Soft tissue superior  and anterior portion of the urinary bladder may represent mass but could  be due to extensive inflammation.     Reproductive: Unremarkable as visualized     Bones: No acute bony abnormality.     IMPRESSION:     1. Marked urinary bladder wall thickening with abnormal soft tissue  along the superior margin of the urinary bladder and a small fluid  collection within the soft tissue. It measures 1 cm. There is a tract  extending from the soft tissue mass to the markedly inflamed sigmoid  colon. Surgical consultation suggested.     2. Other findings as above       I reviewed the patient's new clinical results.    Cancer Staging (if applicable)  Cancer Patient: __ yes __no __unknown; If yes, clinical stage T:__ N:__M:__, stage group or __N/A      Impression: Sigmoid diverticulitis       Plan: COLON RESECTION LOW ANTERIOR LAPAROSCOPIC, TRANSANAL EXTRACTION OF SPECIMEN WITH DAVINCI ROBOT; CYSTOSCOPY  TEMPORARY PLACEMENT BILATERAL URETERAL CATHETER - SURGEON MAY CHANGE       Heather Cage, APRN   3/11/2025   09:32 EDT

## 2025-03-11 NOTE — ANESTHESIA PROCEDURE NOTES
Airway  Reason: elective    Date/Time: 3/11/2025 10:55 AM  Airway not difficult    General Information and Staff    Patient location during procedure: OR    Indications and Patient Condition  Indications for airway management: airway protection    Preoxygenated: yes  MILS not maintained throughout    Mask difficulty assessment: 0 - not attempted    Final Airway Details    Final airway type: endotracheal airway      Successful airway: ETT  Cuffed: yes   Successful intubation technique: direct laryngoscopy  Adjuncts used in placement: intubating stylet  Endotracheal tube insertion site: oral  Blade: Earl  Blade size: 2  ETT size (mm): 7.5  Cormack-Lehane Classification: grade I - full view of glottis  Placement verified by: chest auscultation and capnometry   Inital cuff pressure (cm H2O): 7  Measured from: lips  ETT/EBT  to lips (cm): 22  Number of attempts at approach: 1  Assessment: lips, teeth, and gum same as pre-op and atraumatic intubation    Additional Comments  Negative epigastric sounds, Breath sound equal bilaterally with symmetric chest rise and fall; HOB up 30 degrees during induction until airway secured

## 2025-03-11 NOTE — ANESTHESIA PREPROCEDURE EVALUATION
Anesthesia Evaluation     Patient summary reviewed and Nursing notes reviewed                Airway   Mallampati: II  TM distance: >3 FB  Neck ROM: full  No difficulty expected  Dental - normal exam   (+) upper dentures and lower dentures    Pulmonary - normal exam   (+) a smoker Current,  Cardiovascular - normal exam    (+) dysrhythmias Paroxysmal Atrial Fib, PVD, hyperlipidemia    ROS comment:   Echo 2/19: normal EF, no significant valve disease     Neuro/Psych- negative ROS  GI/Hepatic/Renal/Endo    (+) GERD    Musculoskeletal (-) negative ROS    Abdominal  - normal exam    Bowel sounds: normal.   Substance History - negative use     OB/GYN negative ob/gyn ROS         Other      history of cancer (esophageal s/p esophagectomy)                  Anesthesia Plan    ASA 3     general with block   Rapid sequence  intravenous induction     Anesthetic plan, risks, benefits, and alternatives have been provided, discussed and informed consent has been obtained with: patient.    Plan discussed with CRNA.    CODE STATUS:

## 2025-03-11 NOTE — H&P
Admission      Patient Name: Devan Alarcon  MRN: [id number]  : 1975  DOS: 3/11/2025    Attending: Les Farfan MD    Primary Care Provider: Macey Brewer APRN      Patient Care Team:  Macey Brewer APRN as PCP - General (Family Medicine)  Mariposa Atkinson APRN as Nurse Practitioner (Gastroenterology)    Chief complaint: Recurrent sigmoid diverticulitis    Subjective   Patient is a pleasant 49 y.o. male presented for scheduled surgery by Dr. Tan..   He has history of diverticulitis and intermittent left lower quadrant pain. He has history of polyps with colonoscopy in 2018. He had colonoscopy 2024 that showed diverticulosis in sigmoid colon and descending colon as well as internal hemorrhoids. He denies any recent difficulty with constipation, diarrhea or bloody stools.     Today he underwent the following procedures:  Robotic low anterior resection with transanal extraction of the specimen.  Robotic incision and drainage of pelvic abscess.  Robotic left ureterolysis  He also underwent cystourethroscopy and intraoperative bilateral catheter placement, by urology    He tolerated surgery well and is admitted for further management.    Seen in PACU, drowsy but appropriate.  No nausea, vomiting, or shortness of breath.  .  Allergies:  No Known Allergies    Meds:  Medications Prior to Admission   Medication Sig Dispense Refill Last Dose/Taking    cefdinir (OMNICEF) 300 MG capsule Take 1 capsule by mouth 2 (Two) Times a Day. 20 capsule 0 3/10/2025    cyclobenzaprine (FLEXERIL) 5 MG tablet Take 1 tablet by mouth 3 (Three) Times a Day As Needed for Muscle Spasms. 90 tablet 5 Past Week    dicyclomine (BENTYL) 20 MG tablet Take 1 tablet by mouth 2 (Two) Times a Day. (Patient taking differently: Take 1 tablet by mouth Daily.) 60 tablet 5 Past Week    ferrous gluconate (FERGON) 324 MG tablet Take 1 tablet by mouth Daily With Breakfast. 30 tablet 5 Past Week    gabapentin  (NEURONTIN) 100 MG capsule Take 1 capsule by mouth At Night As Needed (restless leg syndrome). 30 capsule 2 Past Week    LORazepam (Ativan) 0.5 MG tablet Take 1 tablet by mouth 2 (Two) Times a Day As Needed for Anxiety for up to 3 days. 6 tablet 0 3/10/2025    metroNIDAZOLE (FLAGYL) 500 MG tablet 1 tablet.   3/10/2025    neomycin (MYCIFRADIN) 500 MG tablet    3/10/2025    albuterol sulfate  (90 Base) MCG/ACT inhaler Inhale 2 puffs Every 4 (Four) Hours As Needed for Wheezing. 18 g 2 More than a month    fexofenadine (Allegra Allergy) 180 MG tablet Take 1 tablet by mouth Daily. (Patient taking differently: Take 1 tablet by mouth Daily As Needed (allergies).) 30 tablet 5 More than a month        Past Medical History:   Diagnosis Date    Arm fracture     left arm     Arthritis     knee and hands    Diverticulitis     Esophageal cancer     Oct 2018, esophagectomy    GERD (gastroesophageal reflux disease)     History of migraine headaches     Hyperlipidemia     Hyperlipidemia 01/20/2017     Past Surgical History:   Procedure Laterality Date    COLONOSCOPY      COLONOSCOPY N/A 11/10/2021    Procedure: COLONOSCOPY;  Surgeon: Joi Bains MD;  Location: [facility] OR;  Service: Gastroenterology;  Laterality: N/A;    COLONOSCOPY N/A 12/19/2024    Procedure: COLONOSCOPY;  Surgeon: Joi Bains MD;  Location: [facility] OR;  Service: Gastroenterology;  Laterality: N/A;    ENDOSCOPY N/A 9/20/2018    Procedure: ESOPHAGOGASTRODUODENOSCOPY WITH BIOPSY CPT CODE: 92692;  Surgeon: Baron Mccall MD;  Location: [facility] OR;  Service: Gastroenterology    ENDOSCOPY N/A 10/17/2018    Procedure: ESOPHAGOGASTRODUODENOSCOPY WITH BIOPSY CPT CODE: 27153;  Surgeon: Baron Mccall MD;  Location: [facility] OR;  Service: Gastroenterology    ENDOSCOPY      with ablation    ESOPHAGECTOMY      MOUTH SURGERY      UPPER GASTROINTESTINAL ENDOSCOPY      VENTRAL/INCISIONAL HERNIA REPAIR N/A 7/12/2019    Procedure:  "VENTRAL/INCISIONAL HERNIA REPAIR LAPAROSCOPIC;  Surgeon: Jaswinder Goodrich MD;  Location: [facility];  Service: General     Family History   Problem Relation Age of Onset    Osteoporosis Mother     No Known Problems Father      Social History     Tobacco Use    Smoking status: Every Day     Current packs/day: 1.00     Average packs/day: 1 pack/day for 25.8 years (25.8 ttl pk-yrs)     Types: Pipe, Cigarettes     Start date: 10/1/2015    Smokeless tobacco: Never    Tobacco comments:     Quit cigarettes ~2018   Vaping Use    Vaping status: Never Used   Substance Use Topics    Alcohol use: No    Drug use: No       Review of Systems  Pertinent items are noted in HPI    Vital Signs  /86 (BP Location: Right arm)   Pulse 79   Temp 98.2 °F (36.8 °C)   Resp 16   SpO2 96%     Physical Exam:    General Appearance:  Drowsy cooperative, in no acute distress   Head:    Normocephalic, without obvious abnormality, atraumatic   Eyes:            Lids and lashes normal, conjunctivae and sclerae normal, no   icterus, no pallor, corneas clear   Ears:    Ears appear intact with no abnormalities noted   Throat:   No oral lesions, no thrush, oral mucosa moist   Neck:   No adenopathy, supple, trachea midline, no thyromegaly         Lungs:     Clear to auscultation, respirations regular, even and       unlabored. No wheezes or rales.    Heart:    Regular rhythm and normal rate, normal S1 and S2, no murmur    Abdomen:      Soft and benign with clean incisions   Genitalia:    Deferred  Dark urine with methylene blue discoloration   Extremities:   No edema, no cyanosis, no   redness   Pulses:   Pulses palpable and equal bilaterally   Skin:   No bleeding, bruising or rash   Neurologic:   Cranial nerves 2 - 12 grossly intact, no gross motor deficit           Invalid input(s): \"NEUTOPHILPCT\"  Results from last 7 days   Lab Units 03/11/25  0957   POTASSIUM mmol/L 4.0     Lab Results   Component Value Date    HGBA1C 5.40 03/04/2025      " Latest Reference Range & Units 03/04/25 08:56   Sodium 136 - 145 mmol/L 142   Potassium 3.5 - 5.2 mmol/L 5.4 (H)   Chloride 98 - 107 mmol/L 102   CO2 22.0 - 29.0 mmol/L 28.0   Anion Gap 5.0 - 15.0 mmol/L 12.0   BUN 6 - 20 mg/dL 11   Creatinine 0.76 - 1.27 mg/dL 0.95   BUN/Creatinine Ratio 7.0 - 25.0  11.6   eGFR >60.0 mL/min/1.73 97.5   Glucose 65 - 99 mg/dL 95   Calcium 8.6 - 10.5 mg/dL 9.7   Alkaline Phosphatase 39 - 117 U/L 89   Total Protein 6.0 - 8.5 g/dL 7.4   Albumin 3.5 - 5.2 g/dL 4.2   Globulin gm/dL 3.2   A/G Ratio g/dL 1.3   AST (SGOT) 1 - 40 U/L 22   ALT (SGPT) 1 - 41 U/L 25   Total Bilirubin 0.0 - 1.2 mg/dL 0.5   (H): Data is abnormally high   Latest Reference Range & Units 03/04/25 08:56   WBC 3.40 - 10.80 10*3/mm3 8.27   RBC 4.14 - 5.80 10*6/mm3 5.57   Hemoglobin 13.0 - 17.7 g/dL 16.1   Hematocrit 37.5 - 51.0 % 50.8   Platelets 140 - 450 10*3/mm3 278   RDW 12.3 - 15.4 % 13.9   MCV 79.0 - 97.0 fL 91.2   MCH 26.6 - 33.0 pg 28.9   MCHC 31.5 - 35.7 g/dL 31.7   MPV 6.0 - 12.0 fL 10.1   RDW-SD 37.0 - 54.0 fl 46.5     Assessment and Plan:       S/P colectomy (Robotic LAR, I and D of pelvic abscess, left ureterolysis)    Hyperlipidemia    Gastroesophageal reflux disease    Restless leg syndrome    PVD (peripheral vascular disease)    Diverticulosis large intestine w/o perforation or abscess w/o bleeding    Sigmoid diverticulitis, recurrent      Plan  1. Ambulation, several times daily  2. Pain control-PRNs, multimodal approach   3. IS-encourage  4. DVT proph- Mechanical, subcutaneous Lovenox  5. Bowel regimen, alvimopan  6. Resume home medications as appropriate  7. Monitor post-op labs  8. Discharge planning   9. Diet, Clears, advance diet as tolerated.  IVF initially, monitor volume status.      Dragon disclaimer:  Part of this encounter note is an electronic transcription/translation of spoken language to printed text. The electronic translation of spoken language may permit erroneous, or at times,  nonsensical words or phrases to be inadvertently transcribed; Although I have reviewed the note for such errors, some may still exist.    Les Farfan MD  03/11/25  14:24 EDT

## 2025-03-11 NOTE — OP NOTE
[facility]  OPERATIVE REPORT      Devan Alarcon  3/11/2025    Pre-op Diagnosis:   Recurrent sigmoid diverticulitis     Post-op Diagnosis:    Recurrent sigmoid diverticulitis     Procedure(s) Performed:  Robotic low anterior resection with transanal extraction of the specimen.  Robotic incision and drainage of pelvic abscess.  Robotic left ureterolysis.    Rigid proctoscopy.    Surgeon(s):  Cora Tan MD Stark, Timothy, MD    Anesthesia: General with Block    ASA Score: III       Staff:   Circulator: Yasemin Gonsalez, BRENT; Meron Morrison, BRENT; Fatimah Noble, RN  Scrub Person: Sania Macdonald; Vane Nath; Eli Villalobos RN  Nursing Assistant: Kerry Carmona  Assistant: Chang Faust PA  Orientee: Elvie Chou RN    Assistant: Chang Faust PA was responsible for performing the following activities: Retraction, Suction, Irrigation, Suturing, Closing, Placing Dressing, and Held/Positioned Camera and their skilled assistance was necessary for the success of this case.     This procedure was not performed to treat colon cancer through resection         Estimated Blood Loss: 200ml    Urine Voided: * No values recorded between 3/11/2025 10:43 AM and 3/11/2025  1:49 PM *    Specimens:                Specimens       ID Source Type Tests Collected By Collected At Frozen?    A Large Intestine, Rectosigmoid  Tissue TISSUE PATHOLOGY EXAM   Cora Tan MD 3/11/25 4154     Description: Sigmoid and Upper Rectum                  Drains:   Urethral Catheter Silicone 16 Fr. (Active)       [REMOVED] Ureteral Drain/Stent  5 Fr. (Removed)       Findings: Significant adhesions to the left pelvic sidewall.  Chronic appearing abscess cavity in the left pelvic sidewall.    Complications: None.    Procedure in Detail:   Lithotomy position.    Cystoscopy and bilateral ureteral catheters with isocyanate green dye will be dictated separately by Dr. Ruben Garcia.      Operative site was  prepped and draped in the usual sterile fashion.  Veress needle abdominal entry at the umbilicus was performed.  Saline drop test was reassuring.  Initial pressures were low.  The abdomen was insufflated to 15 mmHg pressure.       Optiview trocar was used to gain entrance in the abdomen without complication.  The Veress needle site was inspected and there was no evidence of injury.    The patient was placed in Trendelenburg position to 28 degrees and 9 degrees right side tilt.     Robot was then docked after 4 additional 8mm robot ports were placed under direct visualization.    Pathology noted in the mid to distal sigmoid colon with adhesions in the pelvis.  Specifically to the left pelvic sidewall and dome of the bladder.  Tedious dissection was performed to take the colon off of the bladder.  The line of Toldt was then incised and left ureterolysis was performed to identify the left ureteral catheter with the aid of firefly.    The left ureter was identified and kept out of the plane of dissection at all times.     EEA sizers were used to choose a 28 mm circular stapler.    Rectum transected below where the taenia splayed, below the sacral promontory with the vessel seal device.  The mesentery of the colon was transected using the vessel sealer device up to the level of the healthy spot on the descending colon, taking care to stay close to the colon.    A healthy location on the descending colon was chosen for a point of transection. This was performed using the vessel sealer device.  Isocyanate green was used to confirm good fluorescence at the descending colon conduit as well as the rectum.    A small pliable Manuel wound protector was then placed through the rectum.  The rectum was dilated with a large sizer.  The specimen was spatulated and removed from the rectum.    The anvil placed in the proximal colon and secured with a pursestring of 3-0 strata fix.  Another pursestring was placed on the rectum and the  stapler was placed into the rectum and the spike was opened and the pursestring was secured around the spike.    Anastomosis was performed.   28 mm Ethicon stapler and was used.    Rigid proctoscopy performed anastomosis under saline irrigation.  Anastomosis was found to be hemostatic, airtight, and circumferentially intact.  I did put a running 3-0 strata fix around the anastomosis for reinforcement, as a matter of routine.    The anastomosis was inspected and found to be tension-free.    Abdomen was inspected for hemostasis.  Achieved.    Abdomen was inspected for other pathology. None noted.     Skin closed with monocryl. Exofin dressing applied.     Ureteral catheters were removed intact.    All counts were announced as correct at the end of the case. Patient tolerated procedure well, extubated in the operating room and transferred to recovery room in stable condition.        Cora Tan MD     Date: 3/11/2025  Time: 13:50 EDT

## 2025-03-12 PROBLEM — G89.18 ACUTE POSTOPERATIVE PAIN: Status: ACTIVE | Noted: 2025-03-12

## 2025-03-12 PROBLEM — D72.829 LEUKOCYTOSIS: Status: ACTIVE | Noted: 2025-03-12

## 2025-03-12 LAB
ANION GAP SERPL CALCULATED.3IONS-SCNC: 12 MMOL/L (ref 5–15)
BASOPHILS # BLD AUTO: 0.02 10*3/MM3 (ref 0–0.2)
BASOPHILS NFR BLD AUTO: 0.1 % (ref 0–1.5)
BUN SERPL-MCNC: 11 MG/DL (ref 6–20)
BUN/CREAT SERPL: 13.1 (ref 7–25)
CALCIUM SPEC-SCNC: 8.4 MG/DL (ref 8.6–10.5)
CHLORIDE SERPL-SCNC: 102 MMOL/L (ref 98–107)
CO2 SERPL-SCNC: 23 MMOL/L (ref 22–29)
CREAT SERPL-MCNC: 0.84 MG/DL (ref 0.76–1.27)
DEPRECATED RDW RBC AUTO: 47.4 FL (ref 37–54)
EGFRCR SERPLBLD CKD-EPI 2021: 106.2 ML/MIN/1.73
EOSINOPHIL # BLD AUTO: 0 10*3/MM3 (ref 0–0.4)
EOSINOPHIL NFR BLD AUTO: 0 % (ref 0.3–6.2)
ERYTHROCYTE [DISTWIDTH] IN BLOOD BY AUTOMATED COUNT: 14.2 % (ref 12.3–15.4)
GLUCOSE BLDC GLUCOMTR-MCNC: 118 MG/DL (ref 70–130)
GLUCOSE BLDC GLUCOMTR-MCNC: 120 MG/DL (ref 70–130)
GLUCOSE BLDC GLUCOMTR-MCNC: 134 MG/DL (ref 70–130)
GLUCOSE BLDC GLUCOMTR-MCNC: 136 MG/DL (ref 70–130)
GLUCOSE SERPL-MCNC: 139 MG/DL (ref 65–99)
HCT VFR BLD AUTO: 45.8 % (ref 37.5–51)
HGB BLD-MCNC: 15.3 G/DL (ref 13–17.7)
IMM GRANULOCYTES # BLD AUTO: 0.1 10*3/MM3 (ref 0–0.05)
IMM GRANULOCYTES NFR BLD AUTO: 0.5 % (ref 0–0.5)
LYMPHOCYTES # BLD AUTO: 1.05 10*3/MM3 (ref 0.7–3.1)
LYMPHOCYTES NFR BLD AUTO: 4.9 % (ref 19.6–45.3)
MAGNESIUM SERPL-MCNC: 1.9 MG/DL (ref 1.6–2.6)
MCH RBC QN AUTO: 30.4 PG (ref 26.6–33)
MCHC RBC AUTO-ENTMCNC: 33.4 G/DL (ref 31.5–35.7)
MCV RBC AUTO: 90.9 FL (ref 79–97)
MONOCYTES # BLD AUTO: 1.14 10*3/MM3 (ref 0.1–0.9)
MONOCYTES NFR BLD AUTO: 5.3 % (ref 5–12)
NEUTROPHILS NFR BLD AUTO: 19.05 10*3/MM3 (ref 1.7–7)
NEUTROPHILS NFR BLD AUTO: 89.2 % (ref 42.7–76)
NRBC BLD AUTO-RTO: 0 /100 WBC (ref 0–0.2)
PLATELET # BLD AUTO: 284 10*3/MM3 (ref 140–450)
PMV BLD AUTO: 10.4 FL (ref 6–12)
POTASSIUM SERPL-SCNC: 4.7 MMOL/L (ref 3.5–5.2)
RBC # BLD AUTO: 5.04 10*6/MM3 (ref 4.14–5.8)
SODIUM SERPL-SCNC: 137 MMOL/L (ref 136–145)
WBC NRBC COR # BLD AUTO: 21.36 10*3/MM3 (ref 3.4–10.8)

## 2025-03-12 PROCEDURE — 80048 BASIC METABOLIC PNL TOTAL CA: CPT | Performed by: COLON & RECTAL SURGERY

## 2025-03-12 PROCEDURE — 25010000002 ERTAPENEM PER 500 MG: Performed by: COLON & RECTAL SURGERY

## 2025-03-12 PROCEDURE — 25810000003 SODIUM CHLORIDE 0.9 % SOLUTION: Performed by: INTERNAL MEDICINE

## 2025-03-12 PROCEDURE — 85025 COMPLETE CBC W/AUTO DIFF WBC: CPT | Performed by: COLON & RECTAL SURGERY

## 2025-03-12 PROCEDURE — 83735 ASSAY OF MAGNESIUM: CPT | Performed by: COLON & RECTAL SURGERY

## 2025-03-12 PROCEDURE — 82948 REAGENT STRIP/BLOOD GLUCOSE: CPT

## 2025-03-12 PROCEDURE — 25010000002 ENOXAPARIN PER 10 MG: Performed by: COLON & RECTAL SURGERY

## 2025-03-12 PROCEDURE — 25010000002 MORPHINE PER 10 MG: Performed by: COLON & RECTAL SURGERY

## 2025-03-12 RX ORDER — SODIUM CHLORIDE 9 MG/ML
100 INJECTION, SOLUTION INTRAVENOUS CONTINUOUS
Status: DISCONTINUED | OUTPATIENT
Start: 2025-03-12 | End: 2025-03-13 | Stop reason: HOSPADM

## 2025-03-12 RX ADMIN — DIAZEPAM 5 MG: 5 TABLET ORAL at 08:30

## 2025-03-12 RX ADMIN — ALVIMOPAN 12 MG: 12 CAPSULE ORAL at 08:30

## 2025-03-12 RX ADMIN — IBUPROFEN 400 MG: 800 TABLET ORAL at 15:04

## 2025-03-12 RX ADMIN — SODIUM CHLORIDE 100 ML/HR: 9 INJECTION, SOLUTION INTRAVENOUS at 11:18

## 2025-03-12 RX ADMIN — ACETAMINOPHEN 1000 MG: 500 TABLET, FILM COATED ORAL at 06:07

## 2025-03-12 RX ADMIN — IBUPROFEN 400 MG: 800 TABLET ORAL at 20:03

## 2025-03-12 RX ADMIN — MORPHINE SULFATE 2 MG: 2 INJECTION, SOLUTION INTRAMUSCULAR; INTRAVENOUS at 20:03

## 2025-03-12 RX ADMIN — OXYCODONE 5 MG: 5 TABLET ORAL at 06:38

## 2025-03-12 RX ADMIN — OXYCODONE 5 MG: 5 TABLET ORAL at 01:26

## 2025-03-12 RX ADMIN — IBUPROFEN 400 MG: 800 TABLET ORAL at 08:30

## 2025-03-12 RX ADMIN — ACETAMINOPHEN 1000 MG: 500 TABLET, FILM COATED ORAL at 23:56

## 2025-03-12 RX ADMIN — OXYCODONE 5 MG: 5 TABLET ORAL at 12:36

## 2025-03-12 RX ADMIN — ACETAMINOPHEN 1000 MG: 500 TABLET, FILM COATED ORAL at 17:57

## 2025-03-12 RX ADMIN — OXYCODONE 5 MG: 5 TABLET ORAL at 23:56

## 2025-03-12 RX ADMIN — ACETAMINOPHEN 1000 MG: 500 TABLET, FILM COATED ORAL at 12:36

## 2025-03-12 RX ADMIN — ERTAPENEM 1000 MG: 1 INJECTION INTRAMUSCULAR; INTRAVENOUS at 15:04

## 2025-03-12 RX ADMIN — SODIUM CHLORIDE 100 ML/HR: 9 INJECTION, SOLUTION INTRAVENOUS at 01:27

## 2025-03-12 RX ADMIN — MORPHINE SULFATE 2 MG: 2 INJECTION, SOLUTION INTRAMUSCULAR; INTRAVENOUS at 06:41

## 2025-03-12 RX ADMIN — ENOXAPARIN SODIUM 40 MG: 100 INJECTION SUBCUTANEOUS at 08:30

## 2025-03-12 NOTE — CASE MANAGEMENT/SOCIAL WORK
Discharge Planning Assessment  [facility]     Patient Name: Devan Alarcon  MRN: [id number]  Today's Date: 3/12/2025    Admit Date: 3/11/2025    Plan: Home   Discharge Needs Assessment       Row Name 03/12/25 1328       Living Environment    People in Home spouse    Current Living Arrangements home    Potentially Unsafe Housing Conditions none    In the past 12 months has the electric, gas, oil, or water company threatened to shut off services in your home? No    Primary Care Provided by self;spouse/significant other    Provides Primary Care For spouse    Family Caregiver if Needed spouse    Family Caregiver Names Autumn spouse    Quality of Family Relationships helpful;involved;supportive    Living Arrangement Comments [place] in [facility] w/spouse in one level home 1 small step to enter       Resource/Environmental Concerns    Resource/Environmental Concerns none       Transportation Needs    In the past 12 months, has lack of transportation kept you from medical appointments or from getting medications? no    In the past 12 months, has lack of transportation kept you from meetings, work, or from getting things needed for daily living? No       Food Insecurity    Within the past 12 months, you worried that your food would run out before you got the money to buy more. Never true    Within the past 12 months, the food you bought just didn't last and you didn't have money to get more. Never true       Transition Planning    Patient/Family Anticipates Transition to home with family    Patient/Family Anticipated Services at Transition none    Transportation Anticipated family or friend will provide       Discharge Needs Assessment    Equipment Currently Used at Home walker, rolling;cane, straight;wheelchair    Do you want help finding or keeping work or a job? I do not need or want help    Do you want help with school or training? For example, starting or completing job training or getting a high school diploma, GED  or equivalent No                   Discharge Plan       Row Name 03/12/25 1329       Plan    Plan Home    Patient/Family in Agreement with Plan yes    Plan Comments I met w/patient and spouse/significant other Autumn in room to obtain IDP and initiate d/c planning. Insurance of [employer] confirmed. F[facility] scripts [facility] w/no isssues obtaining medications. Has DME equipment @ home but does not use @ baseline, sig other has MS, so they have RW's, canes and a wheelchair. Denies HH, home O2. No history of inpatient rehab. DC plan is home and family will transport. No d/c needs expressed @ this time. CM will continue to follow.    Final Discharge Disposition Code 01 - home or self-care                       Demographic Summary       Row Name 03/12/25 1326       General Information    Admission Type inpatient    Arrived From home    Referral Source hospital clinician/department    Preferred Language English    General Information Comments PCP Macey PASTRANA on file                   Functional Status       Row Name 03/12/25 1327       Functional Status    Usual Activity Tolerance excellent    Current Activity Tolerance good       Physical Activity    On average, how many days per week do you engage in moderate to strenuous exercise (like a brisk walk)? 4 days    On average, how many minutes do you engage in exercise at this level? 30 min    Number of minutes of exercise per week 120       Functional Status, IADL    Medications independent    Meal Preparation independent    Housekeeping independent    Laundry independent    Shopping independent    If for any reason you need help with day-to-day activities such as bathing, preparing meals, shopping, managing finances, etc., do you get the help you need? I don't need any help       Employment/    Employment Status employed full-time    Shift Worked first shift                   Psychosocial    No documentation.                   Abuse/Neglect    No documentation.                  Legal    No documentation.                  Substance Abuse    No documentation.                  Patient Forms    No documentation.                     Brian Galeana RN

## 2025-03-12 NOTE — PLAN OF CARE
Problem: Adult Inpatient Plan of Care  Goal: Plan of Care Review  Outcome: Progressing  Goal: Patient-Specific Goal (Individualized)  Outcome: Progressing  Goal: Absence of Hospital-Acquired Illness or Injury  Outcome: Progressing  Intervention: Identify and Manage Fall Risk  Recent Flowsheet Documentation  Taken 3/11/2025 2016 by Reymundo Patiño RN  Safety Promotion/Fall Prevention:   clutter free environment maintained   fall prevention program maintained   safety round/check completed   room organization consistent  Intervention: Prevent Skin Injury  Recent Flowsheet Documentation  Taken 3/11/2025 2016 by Reymundo Patiño RN  Body Position:   position changed independently   supine  Skin Protection: protective footwear used  Intervention: Prevent and Manage VTE (Venous Thromboembolism) Risk  Recent Flowsheet Documentation  Taken 3/11/2025 2016 by Reymundo Patiño RN  VTE Prevention/Management: SCDs (sequential compression devices) on  Intervention: Prevent Infection  Recent Flowsheet Documentation  Taken 3/11/2025 2016 by Reymundo Patiño RN  Infection Prevention:   rest/sleep promoted   environmental surveillance performed  Goal: Optimal Comfort and Wellbeing  Outcome: Progressing  Intervention: Monitor Pain and Promote Comfort  Recent Flowsheet Documentation  Taken 3/12/2025 0126 by Reymundo Patiño RN  Pain Management Interventions: pain medication given  Intervention: Provide Person-Centered Care  Recent Flowsheet Documentation  Taken 3/11/2025 2016 by Reymundo Patiño RN  Trust Relationship/Rapport:   care explained   emotional support provided   questions answered  Goal: Readiness for Transition of Care  Outcome: Progressing     Problem: Comorbidity Management  Goal: Blood Glucose Level Within Target Range  Outcome: Progressing  Goal: Blood Pressure in Desired Range  Outcome: Progressing     Problem: Bowel Resection  Goal: Absence of Bleeding  Outcome: Progressing  Goal: Effective Bowel  Elimination  Outcome: Progressing  Goal: Fluid and Electrolyte Balance  Outcome: Progressing  Goal: Absence of Infection Signs and Symptoms  Outcome: Progressing  Goal: Optimal Nutrition Delivery  Outcome: Progressing  Goal: Anesthesia/Sedation Recovery  Outcome: Progressing  Intervention: Optimize Anesthesia Recovery  Recent Flowsheet Documentation  Taken 3/11/2025 2200 by Reymundo Patiño RN  Administration (IS): self-administered  Taken 3/11/2025 2100 by Reymundo Patiño RN  Administration (IS): self-administered  Taken 3/11/2025 2016 by Reymundo Patiño RN  Safety Promotion/Fall Prevention:   clutter free environment maintained   fall prevention program maintained   safety round/check completed   room organization consistent  Taken 3/11/2025 2000 by Reymundo Patiño RN  Administration (IS): self-administered  Taken 3/11/2025 1900 by Reymundo Patiño RN  Administration (IS): self-administered  Goal: Optimal Pain Control and Function  Outcome: Progressing  Intervention: Prevent or Manage Pain  Recent Flowsheet Documentation  Taken 3/12/2025 0126 by Reymundo Patiño RN  Pain Management Interventions: pain medication given  Goal: Nausea and Vomiting Relief  Outcome: Progressing  Goal: Effective Urinary Elimination  Outcome: Progressing  Goal: Effective Oxygenation and Ventilation  Outcome: Progressing   Goal Outcome Evaluation:

## 2025-03-12 NOTE — PAYOR COMM NOTE
"Dionne Orosco (49 y.o. Male)       Date of Birth   1975    Social Security Number       Address   [address] [place] [address]    Home Phone   [phone]    MRN   [id number]       Sikhism   None    Marital Status   Single                            Admission Date   3/11/2025    Admission Type   Elective    Admitting Provider   Cora Tan MD    Attending Provider   Cora Tan MD    Department, Room/Bed   [address], S587/1       Discharge Date       Discharge Disposition       Discharge Destination                                 Attending Provider: Cora Tan MD    Allergies: No Known Allergies    Isolation: None   Infection: None   Code Status: CPR    Ht: 188 cm (74\")   Wt: 101 kg (222 lb)    Admission Cmt: None   Principal Problem: S/P colectomy (Robotic LAR, I and D of pelvic abscess, left ureterolysis) [Z90.49]                   Active Insurance as of 3/11/2025       Primary Coverage       Payor Plan Insurance Group Employer/Plan Group    [employer]UE [employer]OSS [employer] BLUE [employer] PPO [id number]       Payor Plan Address Payor Plan Phone Number Payor Plan Fax Number Effective Dates    [address] [phone]  1/1/2016 - None Entered    [place] [address]         Subscriber Name Subscriber Birth Date Member ID       DIONNE OROSCO 1975 [id number]                     Emergency Contacts        (Rel.) Home Phone Work Phone Mobile Phone    Autumn Vázquez (Significant Other) -- -- [phone]              [place]: NPI [id number] Tax ID [id number]  Insurance Information                  [employer]UE [employer] PPO Phone: [phone]    Subscriber: Dionne Orosco Subscriber#: [id number]    Group#: [id number] Precert#: --    Authorization#: [id number] Effective Date: --          Current Facility-Administered Medications   Medication Dose Route Frequency Provider Last Rate Last Admin    acetaminophen (TYLENOL) " tablet 1,000 mg  1,000 mg Oral Q6H Cora Tan MD   1,000 mg at 03/12/25 0607    albuterol (PROVENTIL) nebulizer solution 0.083% 2.5 mg/3mL  2.5 mg Nebulization Q4H PRN Cora Tan MD        cetirizine (zyrTEC) tablet 10 mg  10 mg Oral Daily PRN Cora Tan MD        cyclobenzaprine (FLEXERIL) tablet 5 mg  5 mg Oral TID PRN Cora Tan MD        diazePAM (VALIUM) tablet 5 mg  5 mg Oral Q6H PRN Cora Tan MD   5 mg at 03/12/25 0830    enoxaparin sodium (LOVENOX) syringe 40 mg  40 mg Subcutaneous Daily Cora Tan MD   40 mg at 03/12/25 0830    gabapentin (NEURONTIN) capsule 100 mg  100 mg Oral Nightly PRN Cora Tan MD        ibuprofen (ADVIL,MOTRIN) tablet 400 mg  400 mg Oral Q6H Cora Tan MD   400 mg at 03/12/25 0830    insulin regular (humuLIN R,novoLIN R) injection 2-7 Units  2-7 Units Subcutaneous Q6H Cora Tan MD   2 Units at 03/11/25 2332    labetalol (NORMODYNE,TRANDATE) injection 10 mg  10 mg Intravenous Q4H PRN Les Farfan MD        lactated ringers infusion  9 mL/hr Intravenous Continuous Jolly Rojas, CRNA   Held at 03/11/25 1340    morphine injection 2 mg  2 mg Intravenous Q4H PRN Cora Tan MD   2 mg at 03/12/25 0641    And    naloxone (NARCAN) injection 0.4 mg  0.4 mg Intravenous Q5 Min PRN Cora Tan MD        ondansetron (ZOFRAN) injection 4 mg  4 mg Intravenous Q6H PRN Cora Tan MD        oxyCODONE (ROXICODONE) immediate release tablet 5 mg  5 mg Oral Q4H PRN Cora Tan MD   5 mg at 03/12/25 0638    scopolamine patch 1 mg/72 hr  1 patch Transdermal Once Cora Tan MD   1 patch at 03/11/25 0953    sodium chloride 0.9 % bolus 500 mL  500 mL Intravenous TID PRN Les Farfan MD        sodium chloride 0.9 % infusion  100 mL/hr Intravenous Continuous Les Farfan  mL/hr at 03/12/25 1118 100 mL/hr at 03/12/25 1118     Lab Results (last 24 hours)       Procedure Component Value Units  Date/Time    Basic Metabolic Panel [[id number]]  (Abnormal) Collected: 03/12/25 0413    Specimen: Blood Updated: 03/12/25 0539     Glucose 139 mg/dL      BUN 11 mg/dL      Creatinine 0.84 mg/dL      Sodium 137 mmol/L      Potassium 4.7 mmol/L      Chloride 102 mmol/L      CO2 23.0 mmol/L      Calcium 8.4 mg/dL      BUN/Creatinine Ratio 13.1     Anion Gap 12.0 mmol/L      eGFR 106.2 mL/min/1.73     Narrative:      GFR Categories in Chronic Kidney Disease (CKD)      GFR Category          GFR (mL/min/1.73)    Interpretation  G1                     90 or greater         Normal or high (1)  G2                      60-89                Mild decrease (1)  G3a                   45-59                Mild to moderate decrease  G3b                   30-44                Moderate to severe decrease  G4                    15-29                Severe decrease  G5                    14 or less           Kidney failure          (1)In the absence of evidence of kidney disease, neither GFR category G1 or G2 fulfill the criteria for CKD.    eGFR calculation 2021 CKD-EPI creatinine equation, which does not include race as a factor    Magnesium [[id number]]  (Normal) Collected: 03/12/25 0413    Specimen: Blood Updated: 03/12/25 0539     Magnesium 1.9 mg/dL     POC Glucose Once [[id number]]  (Abnormal) Collected: 03/12/25 0528    Specimen: Blood Updated: 03/12/25 0530     Glucose 136 mg/dL     CBC & Differential [[id number]]  (Abnormal) Collected: 03/12/25 0413    Specimen: Blood Updated: 03/12/25 0455    Narrative:      The following orders were created for panel order CBC & Differential.  Procedure                               Abnormality         Status                     ---------                               -----------         ------                     CBC Auto Differential[[id number]]        Abnormal            Final result                 Please view results for these tests on the individual orders.    CBC Auto Differential  [[id number]]  (Abnormal) Collected: 03/12/25 0413    Specimen: Blood Updated: 03/12/25 0455     WBC 21.36 10*3/mm3      RBC 5.04 10*6/mm3      Hemoglobin 15.3 g/dL      Hematocrit 45.8 %      MCV 90.9 fL      MCH 30.4 pg      MCHC 33.4 g/dL      RDW 14.2 %      RDW-SD 47.4 fl      MPV 10.4 fL      Platelets 284 10*3/mm3      Neutrophil % 89.2 %      Lymphocyte % 4.9 %      Monocyte % 5.3 %      Eosinophil % 0.0 %      Basophil % 0.1 %      Immature Grans % 0.5 %      Neutrophils, Absolute 19.05 10*3/mm3      Lymphocytes, Absolute 1.05 10*3/mm3      Monocytes, Absolute 1.14 10*3/mm3      Eosinophils, Absolute 0.00 10*3/mm3      Basophils, Absolute 0.02 10*3/mm3      Immature Grans, Absolute 0.10 10*3/mm3      nRBC 0.0 /100 WBC     POC Glucose Once [[id number]]  (Abnormal) Collected: 03/11/25 2323    Specimen: Blood Updated: 03/11/25 2324     Glucose 158 mg/dL     POC Glucose Once [[id number]]  (Abnormal) Collected: 03/11/25 2129    Specimen: Blood Updated: 03/11/25 2132     Glucose 223 mg/dL     POC Glucose Once [[id number]]  (Abnormal) Collected: 03/11/25 1656    Specimen: Blood Updated: 03/11/25 1659     Glucose 181 mg/dL     Hemoglobin & Hematocrit, Blood [[id number]]  (Normal) Collected: 03/11/25 1412    Specimen: Blood from Arm, Right Updated: 03/11/25 1437     Hemoglobin 15.2 g/dL      Hematocrit 46.7 %     Tissue Pathology Exam [[id number]] Collected: 03/11/25 1256    Specimen: Tissue from Large Intestine, Sigmoid Colon Updated: 03/11/25 1400          Imaging Results (Last 24 Hours)       ** No results found for the last 24 hours. **          Orders (last 24 hrs)        Start     Ordered    03/12/25 0900  ibuprofen (ADVIL,MOTRIN) tablet 400 mg  Every 6 Hours Scheduled         03/11/25 1545    03/12/25 0900  alvimopan (ENTEREG) capsule 12 mg  2 Times Daily,   Status:  Discontinued         03/11/25 1545    03/12/25 0900  enoxaparin sodium (LOVENOX) syringe 40 mg  Daily         03/11/25 1545    03/12/25 0800  Ambulate  Patient - Five Times Daily  5 Times Daily         03/11/25 1545    03/12/25 0758  Diet: Liquid; Full Liquid; Fluid Consistency: Thin (IDDSI 0)  Diet Effective Now         03/12/25 0757    03/12/25 0600  lactated ringers infusion  Continuous         03/11/25 0921    03/12/25 0600  Discontinue Indwelling Urinary Catheter in AM  Once         03/11/25 1545    03/12/25 0600  CBC & Differential  Morning Draw         03/11/25 1545    03/12/25 0600  Basic Metabolic Panel  Morning Draw         03/11/25 1545    03/12/25 0600  Magnesium  Morning Draw         03/11/25 1545    03/12/25 0600  CBC Auto Differential  PROCEDURE ONCE         03/11/25 2202 03/12/25 0531  POC Glucose Once  PROCEDURE ONCE        Comments: Complete no more than 45 minutes prior to patient eating      03/12/25 0528    03/12/25 0100  sodium chloride 0.9 % infusion  Continuous         03/12/25 0005    03/12/25 0000  Advance Diet As Tolerated -  Until Discontinued         03/11/25 1545    03/11/25 2325  POC Glucose Once  PROCEDURE ONCE        Comments: Complete no more than 45 minutes prior to patient eating      03/11/25 2323    03/11/25 2133  POC Glucose Once  PROCEDURE ONCE        Comments: Complete no more than 45 minutes prior to patient eating      03/11/25 2129    03/11/25 1800  acetaminophen (TYLENOL) tablet 1,000 mg  Every 6 Hours         03/11/25 1545    03/11/25 1800  insulin regular (humuLIN R,novoLIN R) injection 2-7 Units  Every 6 Hours Scheduled         03/11/25 1545    03/11/25 1706  cetirizine (zyrTEC) tablet 10 mg  Daily PRN         03/11/25 1545    03/11/25 1702  labetalol (NORMODYNE,TRANDATE) injection 10 mg  Every 4 Hours PRN         03/11/25 1702    03/11/25 1702  sodium chloride 0.9 % bolus 500 mL  3 Times Daily PRN         03/11/25 1702    03/11/25 1700  POC Glucose 4x Daily Before Meals & at Bedtime  4 Times Daily Before Meals & at Bedtime       03/11/25 1545    03/11/25 1700  POC Glucose Once  PROCEDURE ONCE        Comments:  Complete no more than 45 minutes prior to patient eating      03/11/25 1656    03/11/25 1600  Strict Intake and Output  Every 4 Hours       03/11/25 1545    03/11/25 1600  Snack: Chewing gum x30 minutes three times daily to increase saliva flow and provide gas pain relief. Do not give to ileostomy patients.  3 Times Daily      Comments: Chewing gum x30 minutes three times daily to increase saliva flow and provide gas pain relief.      Do not give to ileostomy patients.    03/11/25 1545    03/11/25 1600  Turn Cough & Deep Breathe  Every Hour       03/11/25 1545    03/11/25 1600  Incentive Spirometry  Every Hour While Awake       03/11/25 1545    03/11/25 1547  dextrose 5 % and sodium chloride 0.45 % with KCl 20 mEq/L infusion  Continuous,   Status:  Discontinued         03/11/25 1545    03/11/25 1546  Code Status and Medical Interventions: CPR (Attempt to Resuscitate); Full  Continuous         03/11/25 1545    03/11/25 1546  Place sequential compression device  Once         03/11/25 1545    03/11/25 1546  Continue Indwelling Urinary Catheter Already in Place  Once        Comments: If Patient Alert & Urine Output Greater Than 25 mL/hr    03/11/25 1545    03/11/25 1546  Notify Provider if Bladder Distention Continues  Until Discontinued         03/11/25 1545    03/11/25 1546  Urinary Catheter Care  Every Shift,   Status:  Canceled       03/11/25 1545    03/11/25 1546  Nasogastric Tube Insertion - If Intractable Nausea / Vomiting  Once        Comments: Nurse May Insert Nasogastric Tube Insertion For Intractable Nausea / Vomiting    03/11/25 1545    03/11/25 1546  Turn, Cough & Deep Breathe  Once         03/11/25 1545    03/11/25 1546  Ambulate From Stretcher to Bed upon Arrival to Floor  Once         03/11/25 1545    03/11/25 1546  Ambulate Patient Evening of Surgery  Once         03/11/25 1545    03/11/25 1546  Up in Chair Evening of Surgery  Once         03/11/25 1545    03/11/25 1546  Notify Provider if  Until  "Discontinued         03/11/25 1545    03/11/25 1546  Oxygen Therapy- Nasal Cannula; 2 LPM  Continuous         03/11/25 1545    03/11/25 1546  Diet: Liquid; Clear Liquid; Fluid Consistency: Thin (IDDSI 0)  Diet Effective Now,   Status:  Canceled        Comments: Start 2 Hours After Surgery    03/11/25 1545    03/11/25 1545  morphine injection 2 mg  Every 4 Hours PRN        Placed in \"And\" Linked Group    03/11/25 1545    03/11/25 1545  naloxone (NARCAN) injection 0.4 mg  Every 5 Minutes PRN        Placed in \"And\" Linked Group    03/11/25 1545    03/11/25 1545  diazePAM (VALIUM) tablet 5 mg  Every 6 Hours PRN         03/11/25 1545    03/11/25 1545  ondansetron (ZOFRAN) injection 4 mg  Every 6 Hours PRN         03/11/25 1545    03/11/25 1545  oxyCODONE (ROXICODONE) immediate release tablet 5 mg  Every 4 Hours PRN         03/11/25 1545    03/11/25 1545  albuterol (PROVENTIL) nebulizer solution 0.083% 2.5 mg/3mL  Every 4 Hours PRN         03/11/25 1545    03/11/25 1545  cyclobenzaprine (FLEXERIL) tablet 5 mg  3 Times Daily PRN         03/11/25 1545    03/11/25 1545  gabapentin (NEURONTIN) capsule 100 mg  Nightly PRN         03/11/25 1545    03/11/25 1341  Hemoglobin & Hematocrit, Blood  STAT         03/11/25 1345    03/11/25 1340  sodium chloride 0.9 % flush 3 mL  Every 12 Hours Scheduled,   Status:  Discontinued         03/11/25 1338    03/11/25 1340  lactated ringers infusion  Continuous         03/11/25 1338    03/11/25 1339  Vital signs every 5 minutes for 15 minutes, every 15 minutes thereafter.  Once         03/11/25 1338    03/11/25 1339  Call Anesthesiologist for additional IV Fluid bolus for Hypotension/Tachycardia  Continuous         03/11/25 1338    03/11/25 1339  Discontinue Arterial Line - Recovery RN  to discontinue arterial line if the patient is going to a non critical care floor.  Once        Comments: Recovery RN  to discontinue arterial line if the patient is going to a non critical care floor.    " "03/11/25 1338    03/11/25 1339  Notify Anesthesia of Any Acute Changes in Patient Condition  Until Discontinued         03/11/25 1338    03/11/25 1339  Notify Anesthesia for Unrelieved Pain  Until Discontinued         03/11/25 1338    03/11/25 1339  Insert Peripheral IV  Once         03/11/25 1338    03/11/25 1339  Saline Lock & Maintain IV Access  Continuous         03/11/25 1338    03/11/25 1339  Once DC criteria to floor met, follow surgeon's orders.  Until Discontinued         03/11/25 1338    03/11/25 1339  Discharge patient from PACU when discharge criteria is met.  Until Discontinued         03/11/25 1338    03/11/25 1338  sodium chloride 0.9 % flush 3-10 mL  As Needed,   Status:  Discontinued         03/11/25 1338    03/11/25 1338  sodium chloride 0.9 % infusion 9 mL  As Needed,   Status:  Discontinued         03/11/25 1338    03/11/25 1338  lactated ringers bolus 250 mL  Once As Needed,   Status:  Discontinued         03/11/25 1338    03/11/25 1338  HYDROmorphone (DILAUDID) injection 0.5 mg  Every 10 Minutes PRN,   Status:  Discontinued         03/11/25 1338    03/11/25 1338  oxyCODONE-acetaminophen (PERCOCET) 7.5-325 MG per tablet 1 tablet  Every 4 Hours PRN,   Status:  Discontinued         03/11/25 1338    03/11/25 1338  fentaNYL citrate (PF) (SUBLIMAZE) injection 50 mcg  Every 5 Minutes PRN,   Status:  Discontinued         03/11/25 1338    03/11/25 1338  naloxone (NARCAN) injection 0.4 mg  As Needed,   Status:  Discontinued         03/11/25 1338    03/11/25 1338  ondansetron (ZOFRAN) injection 4 mg  Once As Needed,   Status:  Discontinued         03/11/25 1338    03/11/25 1338  promethazine (PHENERGAN) suppository 25 mg  Once As Needed,   Status:  Discontinued        Placed in \"Or\" Linked Group    03/11/25 1338    03/11/25 1338  promethazine (PHENERGAN) tablet 25 mg  Once As Needed,   Status:  Discontinued        Placed in \"Or\" Linked Group    03/11/25 1338    03/11/25 1338  labetalol " (NORMODYNE,TRANDATE) injection 5 mg  Every 5 Minutes PRN,   Status:  Discontinued         03/11/25 1338    03/11/25 1338  hydrALAZINE (APRESOLINE) injection 5 mg  Every 10 Minutes PRN,   Status:  Discontinued         03/11/25 1338    03/11/25 1338  ipratropium-albuterol (DUO-NEB) nebulizer solution 3 mL  Once As Needed,   Status:  Discontinued         03/11/25 1338    03/11/25 1338  HYDROcodone-acetaminophen (NORCO) 5-325 MG per tablet 1 tablet  Once As Needed,   Status:  Discontinued         03/11/25 1338    03/11/25 1338  Oxygen Therapy- Nasal Cannula; 2 LPM  Continuous,   Status:  Canceled         03/11/25 1337    03/11/25 1338  Continuous Pulse Oximetry  Continuous         03/11/25 1337    03/11/25 1338  Instruct Patient or Nursing Staff to Remove Scopolamine Patch 24 Hours After Application  Once        Comments: May remove sooner is side effects    03/11/25 1337    03/11/25 1322  Tissue Pathology Exam  RELEASE UPON ORDERING         03/11/25 1322    03/11/25 1112  indocyanine green (IC-GREEN) injection  As Needed,   Status:  Discontinued         03/11/25 1148    03/11/25 1112  sodium chloride (NS) irrigation solution  As Needed,   Status:  Discontinued         03/11/25 1148    03/11/25 1112  sterile water irrigation solution  As Needed,   Status:  Discontinued         03/11/25 1148    03/11/25 1112  surgical lubricant gel  As Needed,   Status:  Discontinued         03/11/25 1149    03/11/25 0923  scopolamine patch 1 mg/72 hr  Once         03/11/25 0921    03/11/25 0923  sodium chloride 0.9 % flush 10 mL  Every 12 Hours Scheduled,   Status:  Discontinued         03/11/25 0921    03/11/25 0921  Vital Signs - Per Anesthesia Protocol  As Needed,   Status:  Canceled       03/11/25 0921 03/11/25 0921  sodium chloride 0.9 % flush 10 mL  As Needed,   Status:  Discontinued         03/11/25 0921    03/11/25 0921  lidocaine PF 1% (XYLOCAINE) injection 0.5 mL  Once As Needed,   Status:  Discontinued         03/11/25  0921    03/11/25 0921  midazolam (VERSED) injection 1 mg  Every 10 Minutes PRN,   Status:  Discontinued         03/11/25 0921    03/06/25 0000  metroNIDAZOLE (FLAGYL) 500 MG tablet         03/11/25 1005    03/06/25 0000  neomycin (MYCIFRADIN) 500 MG tablet         03/11/25 1005    Unscheduled  Vital signs  As Needed       03/11/25 1545    Unscheduled  Change Dressing  As Needed       03/11/25 1545    Unscheduled  Bladder Scan if Patient Unable to Void 4-6 Hours After Catheter Removal  As Needed         03/11/25 1545    Unscheduled  Straight Cath Every 4-6 Hours As Needed If Patient is Unable to Void After 4-6 Hours, Bladder Scan Volume is Greater Than 350-500mL & Patient Has Symptoms of Bladder Discomfort / Distention  As Needed       03/11/25 1545    Unscheduled  Schedule / Prompt Voiding For Patients With Urinary Incontinence  As Needed       03/11/25 1545    Signed and Held  famotidine (PEPCID) injection 20 mg  Once,   Status:  Canceled         Signed and Held                     Operative/Procedure Notes (all)        Jhony Garcia MD at 03/11/25 1112  Version 1 of 1      Summary:Urology Operative Note                 Cystourethroscopy & Intraoperative Bilateral Catheter Placement Operative Report    Patient Name:  Devan Alarcon  YOB: 1975    Date of Surgery:  3/11/2025     Indications: 49-year-old male with history of diverticulitis.  Presents today for low anterior resection with colorectal surgery.  Urology consulted intraoperatively for ureteral identification catheter placement.    Pre-op Diagnosis:   Diverticulitis       Post-Op Diagnosis Codes:  Diverticulitis    Procedure/CPT® Codes: 64753, 05707    1. CYSTOSCOPY BILATERAL URETERAL CATHETER/STENT INSERTION  2. MODIFIER 50, BILATERAL STENT PLACEMENT  3. CLEMENTE CATHETER INSERTION  CHANGE    Staff:  Surgeon(s):  Jhony Garcia MD      Anesthesia: General with Block    Estimated Blood Loss: minimal        Findings:   Normal anterior &  posterior urethra.  Surgeon of bilateral 5 Ukrainian ureteral identification catheter  Marroquin catheter insertion    Complications: None immediate    Description of Procedure:   The patient was identified in the preoperative holding area where informed consent was reviewed and signed. The patient was transported to the operating room per anesthesia and placed supine on the operating table. Smooth endotracheal intubation was performed without issue after administration of general anesthesia. The patient was then placed in the dorsal lithotomy position where genitals were prepped and draped in the usual sterile fashion. A brief timeout was performed identifying the correct patient procedure and laterality. Perioperative antibiotics were administered. All pressure points were padded.      Procedure began by inserting a 22 Ukrainian cystoscope atraumatically per the patient's urethra. Upon entering the bladder formal cystoscopy was performed identifying bilateral orthotopic ureteral orifices and no evidence of tumor, stone, foreign body. Attention was then turned to the right ureteral orifice. A 5 Ukrainian open ended ureteral catheter was inserted into the distal ureter and passed up to the level right renal collecting system without difficulty.  10 mL ICG instilled through the ureteral catheter.  Attention was then turned to the left ureteral orifice.  A 5 Ukrainian open ended ureteral catheter was inserted into the distal ureter and passed up to the level left renal collecting system without difficulty.   10 mL ICG instilled through the ureteral catheter.      A 16F catheter was placed.  10 mL placed in the balloon.  The ureteral catheters were affixed to the Marroquin catheter.     Patient remained under general anesthesia.  The patient will continue scheduled procedure with colorectal service, Dr. Britney MD. Urology service available to assist in any way during the procedure.    Jhony Garcia MD     Date: 3/11/2025  Time: 13:03 EDT         Electronically signed by Jhony Garcia MD at 03/11/25 1304       Cora Tan MD at 03/11/25 1112  Version 1 of 1         [facility]  OPERATIVE REPORT      Devan Alarcon  3/11/2025    Pre-op Diagnosis:   Recurrent sigmoid diverticulitis     Post-op Diagnosis:    Recurrent sigmoid diverticulitis     Procedure(s) Performed:  Robotic low anterior resection with transanal extraction of the specimen.  Robotic incision and drainage of pelvic abscess.  Robotic left ureterolysis.    Rigid proctoscopy.    Surgeon(s):  Cora Tan MD Stark, Timothy, MD    Anesthesia: General with Block    ASA Score: III       Staff:   Circulator: Yasemin Gonsalez, RN; Meron Morrison, RN; Fatimah Noble RN  Scrub Person: Sania Macdonald; Vane Nath; Eli Villalobos RN  Nursing Assistant: Kerry Carmona  Assistant: Chang Faust PA  Orientee: Elvie Chou RN    Assistant: Chang Faust PA was responsible for performing the following activities: Retraction, Suction, Irrigation, Suturing, Closing, Placing Dressing, and Held/Positioned Camera and their skilled assistance was necessary for the success of this case.     This procedure was not performed to treat colon cancer through resection         Estimated Blood Loss: 200ml    Urine Voided: * No values recorded between 3/11/2025 10:43 AM and 3/11/2025  1:49 PM *    Specimens:                Specimens       ID Source Type Tests Collected By Collected At Frozen?    A Large Intestine, Rectosigmoid  Tissue TISSUE PATHOLOGY EXAM   Cora Tan MD 3/11/25 1256     Description: Sigmoid and Upper Rectum                  Drains:   Urethral Catheter Silicone 16 Fr. (Active)       [REMOVED] Ureteral Drain/Stent  5 Fr. (Removed)       Findings: Significant adhesions to the left pelvic sidewall.  Chronic appearing abscess cavity in the left pelvic sidewall.    Complications: None.    Procedure in Detail:   Lithotomy  position.    Cystoscopy and bilateral ureteral catheters with isocyanate green dye will be dictated separately by Dr. Ruben Garcia.      Operative site was prepped and draped in the usual sterile fashion.  Veress needle abdominal entry at the umbilicus was performed.  Saline drop test was reassuring.  Initial pressures were low.  The abdomen was insufflated to 15 mmHg pressure.       Optiview trocar was used to gain entrance in the abdomen without complication.  The Veress needle site was inspected and there was no evidence of injury.    The patient was placed in Trendelenburg position to 28 degrees and 9 degrees right side tilt.     Robot was then docked after 4 additional 8mm robot ports were placed under direct visualization.    Pathology noted in the mid to distal sigmoid colon with adhesions in the pelvis.  Specifically to the left pelvic sidewall and dome of the bladder.  Tedious dissection was performed to take the colon off of the bladder.  The line of Toldt was then incised and left ureterolysis was performed to identify the left ureteral catheter with the aid of firefly.    The left ureter was identified and kept out of the plane of dissection at all times.     EEA sizers were used to choose a 28 mm circular stapler.    Rectum transected below where the taenia splayed, below the sacral promontory with the vessel seal device.  The mesentery of the colon was transected using the vessel sealer device up to the level of the healthy spot on the descending colon, taking care to stay close to the colon.    A healthy location on the descending colon was chosen for a point of transection. This was performed using the vessel sealer device.  Isocyanate green was used to confirm good fluorescence at the descending colon conduit as well as the rectum.    A small pliable Manuel wound protector was then placed through the rectum.  The rectum was dilated with a large sizer.  The specimen was spatulated and removed from the  rectum.    The anvil placed in the proximal colon and secured with a pursestring of 3-0 strata fix.  Another pursestring was placed on the rectum and the stapler was placed into the rectum and the spike was opened and the pursestring was secured around the spike.    Anastomosis was performed.   28 mm Ethicon stapler and was used.    Rigid proctoscopy performed anastomosis under saline irrigation.  Anastomosis was found to be hemostatic, airtight, and circumferentially intact.  I did put a running 3-0 strata fix around the anastomosis for reinforcement, as a matter of routine.    The anastomosis was inspected and found to be tension-free.    Abdomen was inspected for hemostasis.  Achieved.    Abdomen was inspected for other pathology. None noted.     Skin closed with monocryl. Exofin dressing applied.     Ureteral catheters were removed intact.    All counts were announced as correct at the end of the case. Patient tolerated procedure well, extubated in the operating room and transferred to recovery room in stable condition.        Cora Tan MD     Date: 3/11/2025  Time: 13:50 EDT         Electronically signed by Cora Tan MD at 03/11/25 1350       Physician Progress Notes (last 24 hours)  Notes from 03/11/25 1126 through 03/12/25 1126   No notes of this type exist for this encounter.       Consult Notes (last 24 hours)  Notes from 03/11/25 1126 through 03/12/25 1126   No notes of this type exist for this encounter.

## 2025-03-12 NOTE — PROGRESS NOTES
Seen and examined.  Chart data reviewed.  Doing well.  Ambulating.  Had a bowel movement.  White blood cell count is 21,000 today.  Abdominal exam as expected.  I would like for him to have a dose of IV antibiotics today and recheck his white blood cell count in the morning.  As long as it is downtrending he can go home with 5 days of Augmentin.  I discussed this with Heather Cage.  His significant other is at the bedside as well.  All questions answered.

## 2025-03-12 NOTE — PROGRESS NOTES
"IM progress note      Devan Alarcon  [id number]  1975     LOS: 1 day     Attending: Cora Tan MD    Primary Care Provider: Macey Brewer APRN      Chief Complaint/Reason for visit:  Abd pain    Subjective   Doing well. Good pain control. Tolerating liquid diet. Ambulating. Hasn't voided yet since hughes removed. No flatus when seen this morning. Denies f/c/n/v/sob/cp.    Objective     Vital Signs    Visit Vitals  /83 (BP Location: Left arm, Patient Position: Lying)   Pulse 95   Temp 97.5 °F (36.4 °C) (Oral)   Resp 18   Ht 188 cm (74\")   SpO2 95%   BMI 28.50 kg/m²     Temp (24hrs), Av.9 °F (36.6 °C), Min:97.5 °F (36.4 °C), Max:98.2 °F (36.8 °C)      Nutrition: Adv to full liquids    Respiratory: RA    Physical Exam:     General Appearance:    Alert, cooperative, in no acute distress   Head:    Normocephalic, without obvious abnormality, atraumatic    Lungs:     Normal effort, symmetric chest rise, no crepitus, clear to      auscultation bilaterally             Heart:    Regular rhythm and normal rate, normal S1 and S2   Abdomen:     Soft, expected tenderness. Clean lap sites with Prineo   Extremities:   No clubbing, cyanosis or edema.  No deformities.    Pulses:   Pulses palpable and equal bilaterally   Skin:   No bleeding, bruising or rash   Neurologic:   Moves all extremities with no obvious focal motor deficit.  Cranial nerves 2 - 12 grossly intact     Results Review:     I reviewed the patient's new clinical results.   Results from last 7 days   Lab Units 25  0413 25  1412   WBC 10*3/mm3 21.36*  --    HEMOGLOBIN g/dL 15.3 15.2   HEMATOCRIT % 45.8 46.7   PLATELETS 10*3/mm3 284  --      Results from last 7 days   Lab Units 25  0413 25  0957   SODIUM mmol/L 137  --    POTASSIUM mmol/L 4.7 4.0   CHLORIDE mmol/L 102  --    CO2 mmol/L 23.0  --    BUN mg/dL 11  --    CREATININE mg/dL 0.84  --    CALCIUM mg/dL 8.4*  --    GLUCOSE mg/dL 139*  --      I reviewed the " patient's new imaging including images and reports.    All medications reviewed.   acetaminophen, 1,000 mg, Oral, Q6H  enoxaparin sodium, 40 mg, Subcutaneous, Daily  ertapenem, 1,000 mg, Intravenous, Q24H  ibuprofen, 400 mg, Oral, Q6H  insulin regular, 2-7 Units, Subcutaneous, Q6H  Scopolamine, 1 patch, Transdermal, Once        Assessment & Plan     S/P colectomy (Robotic LAR, I and D of pelvic abscess, left ureterolysis)    Hyperlipidemia    Gastroesophageal reflux disease    Restless leg syndrome    PVD (peripheral vascular disease)    Diverticulosis large intestine w/o perforation or abscess w/o bleeding    Sigmoid diverticulitis, recurrent    Acute postoperative pain    Leukocytosis, likely reactive      Plan  1. Ambulation  2. Pain control-prns   3. IS-encouraged  4. DVT proph- mechs/Lovenox  5. Bowel regimen- entereg  6. Adv to full liquid diet. Continue IVF   7. Monitor post-op labs  8. DC planning for home     Leukocytosis, likely reactive  -CBC in a.m.      TONO Allen  03/12/25  13:34 EDT

## 2025-03-13 ENCOUNTER — READMISSION MANAGEMENT (OUTPATIENT)
Dept: CALL CENTER | Facility: HOSPITAL | Age: 50
End: 2025-03-13
Payer: COMMERCIAL

## 2025-03-13 VITALS
DIASTOLIC BLOOD PRESSURE: 67 MMHG | RESPIRATION RATE: 16 BRPM | OXYGEN SATURATION: 93 % | HEART RATE: 82 BPM | HEIGHT: 74 IN | TEMPERATURE: 97.5 F | BODY MASS INDEX: 28.5 KG/M2 | SYSTOLIC BLOOD PRESSURE: 123 MMHG

## 2025-03-13 LAB
BASOPHILS # BLD AUTO: 0.04 10*3/MM3 (ref 0–0.2)
BASOPHILS NFR BLD AUTO: 0.3 % (ref 0–1.5)
CYTO UR: NORMAL
DEPRECATED RDW RBC AUTO: 50.4 FL (ref 37–54)
EOSINOPHIL # BLD AUTO: 0.06 10*3/MM3 (ref 0–0.4)
EOSINOPHIL NFR BLD AUTO: 0.4 % (ref 0.3–6.2)
ERYTHROCYTE [DISTWIDTH] IN BLOOD BY AUTOMATED COUNT: 14.6 % (ref 12.3–15.4)
GLUCOSE BLDC GLUCOMTR-MCNC: 95 MG/DL (ref 70–130)
GLUCOSE BLDC GLUCOMTR-MCNC: 97 MG/DL (ref 70–130)
HCT VFR BLD AUTO: 39.2 % (ref 37.5–51)
HGB BLD-MCNC: 12.4 G/DL (ref 13–17.7)
IMM GRANULOCYTES # BLD AUTO: 0.06 10*3/MM3 (ref 0–0.05)
IMM GRANULOCYTES NFR BLD AUTO: 0.4 % (ref 0–0.5)
LAB AP CASE REPORT: NORMAL
LAB AP CLINICAL INFORMATION: NORMAL
LYMPHOCYTES # BLD AUTO: 2.4 10*3/MM3 (ref 0.7–3.1)
LYMPHOCYTES NFR BLD AUTO: 15.8 % (ref 19.6–45.3)
MCH RBC QN AUTO: 29.5 PG (ref 26.6–33)
MCHC RBC AUTO-ENTMCNC: 31.6 G/DL (ref 31.5–35.7)
MCV RBC AUTO: 93.3 FL (ref 79–97)
MONOCYTES # BLD AUTO: 1.02 10*3/MM3 (ref 0.1–0.9)
MONOCYTES NFR BLD AUTO: 6.7 % (ref 5–12)
NEUTROPHILS NFR BLD AUTO: 11.62 10*3/MM3 (ref 1.7–7)
NEUTROPHILS NFR BLD AUTO: 76.4 % (ref 42.7–76)
NRBC BLD AUTO-RTO: 0 /100 WBC (ref 0–0.2)
PATH REPORT.FINAL DX SPEC: NORMAL
PATH REPORT.GROSS SPEC: NORMAL
PLATELET # BLD AUTO: 243 10*3/MM3 (ref 140–450)
PMV BLD AUTO: 10.1 FL (ref 6–12)
RBC # BLD AUTO: 4.2 10*6/MM3 (ref 4.14–5.8)
WBC NRBC COR # BLD AUTO: 15.2 10*3/MM3 (ref 3.4–10.8)

## 2025-03-13 PROCEDURE — 82948 REAGENT STRIP/BLOOD GLUCOSE: CPT

## 2025-03-13 PROCEDURE — 25010000002 ENOXAPARIN PER 10 MG: Performed by: COLON & RECTAL SURGERY

## 2025-03-13 PROCEDURE — 85025 COMPLETE CBC W/AUTO DIFF WBC: CPT | Performed by: COLON & RECTAL SURGERY

## 2025-03-13 RX ORDER — HYDROCODONE BITARTRATE AND ACETAMINOPHEN 7.5; 325 MG/1; MG/1
1 TABLET ORAL EVERY 4 HOURS PRN
Qty: 15 TABLET | Refills: 0 | Status: SHIPPED | OUTPATIENT
Start: 2025-03-13

## 2025-03-13 RX ADMIN — ENOXAPARIN SODIUM 40 MG: 100 INJECTION SUBCUTANEOUS at 09:02

## 2025-03-13 RX ADMIN — OXYCODONE 5 MG: 5 TABLET ORAL at 09:12

## 2025-03-13 RX ADMIN — ENOXAPARIN SODIUM 40 MG: 100 INJECTION SUBCUTANEOUS at 09:13

## 2025-03-13 RX ADMIN — IBUPROFEN 400 MG: 800 TABLET ORAL at 09:02

## 2025-03-13 RX ADMIN — ACETAMINOPHEN 1000 MG: 500 TABLET, FILM COATED ORAL at 12:50

## 2025-03-13 RX ADMIN — OXYCODONE 5 MG: 5 TABLET ORAL at 15:25

## 2025-03-13 RX ADMIN — ACETAMINOPHEN 1000 MG: 500 TABLET, FILM COATED ORAL at 05:49

## 2025-03-13 RX ADMIN — OXYCODONE 5 MG: 5 TABLET ORAL at 04:00

## 2025-03-13 RX ADMIN — IBUPROFEN 400 MG: 800 TABLET ORAL at 03:55

## 2025-03-13 NOTE — PLAN OF CARE
Goal Outcome Evaluation:      Pt is A&O, vitals stable, Pain controlled, ambulating independently, AVS reviewed with patient, pt is being discharged home with spouse.

## 2025-03-13 NOTE — DISCHARGE SUMMARY
Patient Name: Devan Alarcon  MRN: [id number]  : 1975  DOS: 3/13/2025    Attending: Cora Tan MD    Primary Care Provider: Macey Brewer APRN    Date of Admission:.3/11/2025  8:06 AM    Date of Discharge:  3/13/2025    Discharge Diagnosis:     S/P colectomy (Robotic LAR, I and D of pelvic abscess, left ureterolysis)    Hyperlipidemia    Gastroesophageal reflux disease    Restless leg syndrome    PVD (peripheral vascular disease)    Diverticulosis large intestine w/o perforation or abscess w/o bleeding    Sigmoid diverticulitis, recurrent    Acute postoperative pain    Leukocytosis, likely reactive      Hospital Course  At admit    Patient is a pleasant 49 y.o. male presented for scheduled surgery by Dr. Tan..   He has history of diverticulitis and intermittent left lower quadrant pain. He has history of polyps with colonoscopy in 2018. He had colonoscopy 2024 that showed diverticulosis in sigmoid colon and descending colon as well as internal hemorrhoids. He denies any recent difficulty with constipation, diarrhea or bloody stools.      Today he underwent the following procedures:  Robotic low anterior resection with transanal extraction of the specimen.  Robotic incision and drainage of pelvic abscess.  Robotic left ureterolysis  He also underwent cystourethroscopy and intraoperative bilateral catheter placement, by urology     He tolerated surgery well and is admitted for further management.     Seen in PACU, drowsy but appropriate.  No nausea, vomiting, or shortness of breath.    After admit    He was provided pain medication as needed for pain control.    Patient received DVT prophylaxis with subcutaneous Lovenox as well as mechanicals      Patient was started on clear liquid diet, this was advanced when he showed evidence of bowel function return.      Tolerated diet without difficulty.    During his stay patient used an IS for atelectasis prophylaxis.    Home medications  "were resumed as appropriate, and labs were monitored and remained fairly stable.    With the progress he has made, pt is ready for DC home today.      Discussed with patient regarding plan and he shows understanding and agreement.       Procedures Performed  Procedure(s):  COLON RESECTION LOW ANTERIOR LAPAROSCOPIC, TRANSANAL EXTRACTION OF SPECIMEN WITH DAVINCI ROBOT  CYSTOSCOPY TEMPORARY PLACEMENT BILATERAL URETERAL CATHETER       Pertinent Test Results:    I reviewed the patient's new clinical results.   Results from last 7 days   Lab Units 25  0434 25  0413 25  1412   WBC 10*3/mm3 15.20* 21.36*  --    HEMOGLOBIN g/dL 12.4* 15.3 15.2   HEMATOCRIT % 39.2 45.8 46.7   PLATELETS 10*3/mm3 243 284  --      Results from last 7 days   Lab Units 25  0413 25  0957   SODIUM mmol/L 137  --    POTASSIUM mmol/L 4.7 4.0   CHLORIDE mmol/L 102  --    CO2 mmol/L 23.0  --    BUN mg/dL 11  --    CREATININE mg/dL 0.84  --    CALCIUM mg/dL 8.4*  --    GLUCOSE mg/dL 139*  --      I reviewed the patient's new imaging including images and reports.       Discharge Assessment:       Visit Vitals  /67 (BP Location: Left arm, Patient Position: Lying)   Pulse 82   Temp 97.5 °F (36.4 °C) (Oral)   Resp 16   Ht 188 cm (74\")   SpO2 93%   BMI 28.50 kg/m²     Temp (24hrs), Av.8 °F (36.6 °C), Min:97.5 °F (36.4 °C), Max:98.2 °F (36.8 °C)      General Appearance:    Alert, cooperative, in no acute distress   Lungs:     Clear to auscultation,respirations regular, even and                   unlabored    Heart:    Regular rhythm and normal rate, normal S1 and S2    Abdomen:   Soft and benign with clean incisions   Extremities:   Moves all extremities well, no edema, no cyanosis, no              redness   Pulses:   Pulses palpable and equal bilaterally   Skin:   No bleeding, bruising or rash            Discharge Disposition:  Home        Discharge Medications        New Medications        Instructions Start Date "   amoxicillin-clavulanate 875-125 MG per tablet  Commonly known as: AUGMENTIN   1 tablet, Oral, 2 Times Daily      HYDROcodone-acetaminophen 7.5-325 MG per tablet  Commonly known as: NORCO   1 tablet, Oral, Every 4 Hours PRN             Continue These Medications        Instructions Start Date   albuterol sulfate  (90 Base) MCG/ACT inhaler  Commonly known as: PROVENTIL HFA;VENTOLIN HFA;PROAIR HFA   2 puffs, Inhalation, Every 4 Hours PRN      cyclobenzaprine 5 MG tablet  Commonly known as: FLEXERIL   5 mg, Oral, 3 Times Daily PRN      ferrous gluconate 324 MG tablet  Commonly known as: FERGON   324 mg, Oral, Daily With Breakfast      fexofenadine 180 MG tablet  Commonly known as: Allegra Allergy   180 mg, Oral, Daily      gabapentin 100 MG capsule  Commonly known as: NEURONTIN   100 mg, Oral, Nightly PRN             Stop These Medications      cefdinir 300 MG capsule  Commonly known as: OMNICEF     dicyclomine 20 MG tablet  Commonly known as: BENTYL     LORazepam 0.5 MG tablet  Commonly known as: Ativan     metroNIDAZOLE 500 MG tablet  Commonly known as: FLAGYL     neomycin 500 MG tablet  Commonly known as: MYCIFRADIN              Discharge Diet: Low fiber diet    Activity at Discharge: Ambulate.  Restrictions per Dr. Tan       Future Appointments   Date Time Provider Department Center   6/4/2025  8:00 AM Mariposa Atkinson APRN [facility] Jennifer Rea MD per her orders    Discharge took over 30 min    Dragon disclaimer:  Part of this encounter note is an electronic transcription/translation of spoken language to printed text. The electronic translation of spoken language may permit erroneous, or at times, nonsensical words or phrases to be inadvertently transcribed; Although I have reviewed the note for such errors, some may still exist.       Les Farfan MD  03/13/25  14:34 EDT

## 2025-03-13 NOTE — PROGRESS NOTES
Seen and examined.  Chart data reviewed.  Leukocytosis is improving.  Feels well and ready for home 12 weeks or sooner as indicated.

## 2025-03-13 NOTE — OUTREACH NOTE
Prep Survey      Flowsheet Row Responses   [facility] patient discharged from? [place]   Is LACE score < 7 ? No   Eligibility [facility]   Date of Admission 03/11/25   Date of Discharge 03/13/25   Discharge Disposition Home or Self Care   Discharge diagnosis Recurrent sigmoid diverticulitis, COLON RESECTION LOW ANTERIOR LAPAROSCOPIC, TRANSANAL EXTRACTION OF SPECIMEN   Does the patient have one of the following disease processes/diagnoses(primary or secondary)? General Surgery   Does the patient have Home health ordered? No   Is there a DME ordered? No   Prep survey completed? Yes            Ella PATTON - Registered Nurse

## 2025-03-13 NOTE — PLAN OF CARE
Goal Outcome Evaluation:  Plan of Care Reviewed With: patient        Progress: improving            Problem: Adult Inpatient Plan of Care  Goal: Absence of Hospital-Acquired Illness or Injury  Intervention: Prevent Skin Injury  Description: Perform a screening for skin injury risk, such as pressure or moisture-associated skin damage on admission and at regular intervals throughout hospital stay.  Keep all areas of skin (especially folds) clean and dry.  Maintain adequate skin hydration.  Relieve and redistribute pressure and protect bony prominences and skin at risk for injury; implement measures based on patient-specific risk factors.  Match turning and repositioning schedule to clinical condition.  Encourage weight shift frequently; assist with reposition if unable to complete independently.  Float heels off bed; avoid pressure on the Achilles tendon.  Keep skin free from extended contact with medical devices.  Optimize nutrition and hydration.  Encourage functional activity and mobility, as early as tolerated.  Use aids (e.g., slide boards, mechanical lift) during transfer.  Recent Flowsheet Documentation  Taken 3/13/2025 0100 by Miguel Avalos RN  Body Position:   position changed independently   supine  Taken 3/12/2025 2000 by Miguel Avalos RN  Body Position: position changed independently     Problem: Adult Inpatient Plan of Care  Goal: Optimal Comfort and Wellbeing  Intervention: Monitor Pain and Promote Comfort  Description: Assess pain level, treatment efficacy and patient response at regular intervals using a consistent pain scale.  Consider the presence and impact of preexisting chronic pain.  Encourage patient and caregiver involvement in pain assessment, interventions and safety measures.  Promote activity; balance with sleep and rest to enhance healing.  Recent Flowsheet Documentation  Taken 3/12/2025 2000 by Miguel Avalos RN  Pain Management Interventions: pain medication given      Problem: Bowel Resection  Goal: Optimal Pain Control and Function  Intervention: Prevent or Manage Pain  Description: Set pain management goals; mutually determine pain management plan and review plan regularly.Use a consistent, validated tool for pain assessment including function and quality of life; evaluate pain level, effect of treatment and patient's response at regular intervals.Match pharmacologic analgesia to severity and type of pain mechanism; evaluate risk for opioid use and dependence; consider multimodal approach and titrate to patient response.Provide around-the-clock analgesic agents and nonpharmacologic therapies to keep pain levels in control.Manage medication-induced effects, such as constipation, nausea, pruritus, urinary retention, somnolence and dizziness.Provide multimodal interventions, such as physical activity, therapeutic exercise, yoga, TENS (transcutaneous electrical nerve stimulation) and manual therapy; consider addition of complementary or alternative therapies.Consider and address emotional response to pain.Modify pain perception by using techniques, such as distraction, mindfulness, guided imagery, meditation or music.  Recent Flowsheet Documentation  Taken 3/12/2025 2000 by Miguel Avalos RN  Pain Management Interventions: pain medication given

## 2025-03-13 NOTE — CASE MANAGEMENT/SOCIAL WORK
Case Management Discharge Note      Final Note: Spoke with Mr. Alarcon and wife at bedside. Patient discharging today home today with private transport. There is no discharge needs identified at this time.         Selected Continued Care - Admitted Since 3/11/2025       Destination    No services have been selected for the patient.                Durable Medical Equipment    No services have been selected for the patient.                Dialysis/Infusion    No services have been selected for the patient.                Home Medical Care    No services have been selected for the patient.                Therapy    No services have been selected for the patient.                Community Resources    No services have been selected for the patient.                Community & DME    No services have been selected for the patient.                         Final Discharge Disposition Code: 01 - home or self-care

## 2025-03-14 ENCOUNTER — TRANSITIONAL CARE MANAGEMENT TELEPHONE ENCOUNTER (OUTPATIENT)
Dept: CALL CENTER | Facility: HOSPITAL | Age: 50
End: 2025-03-14
Payer: COMMERCIAL

## 2025-03-14 NOTE — OUTREACH NOTE
Call Center TCM Note      Flowsheet Row Responses   [facility] [place] patient discharged from? [place]   Does the patient have one of the following disease processes/diagnoses(primary or secondary)? General Surgery   TCM attempt successful? No   Unsuccessful attempts Attempt 2            Luh Saldivar RN    3/14/2025, 11:23 EDT

## 2025-03-14 NOTE — OUTREACH NOTE
Call Center TCM Note      Flowsheet Row Responses   [facility] patient discharged from? [place]   Does the patient have one of the following disease processes/diagnoses(primary or secondary)? General Surgery   TCM attempt successful? No   Unsuccessful attempts Attempt 1            Luh Saldivar RN    3/14/2025, 09:35 EDT

## 2025-03-15 ENCOUNTER — TRANSITIONAL CARE MANAGEMENT TELEPHONE ENCOUNTER (OUTPATIENT)
Dept: CALL CENTER | Facility: HOSPITAL | Age: 50
End: 2025-03-15
Payer: COMMERCIAL

## 2025-03-15 NOTE — OUTREACH NOTE
Call Center TCM Note      Flowsheet Row Responses   [facility] patient discharged from? [place]   Does the patient have one of the following disease processes/diagnoses(primary or secondary)? General Surgery   TCM attempt successful? Yes   Call start time 0926   Call end time 0930   Discharge diagnosis Recurrent sigmoid diverticulitis, COLON RESECTION LOW ANTERIOR LAPAROSCOPIC, TRANSANAL EXTRACTION OF SPECIMEN   Meds reviewed with patient/caregiver? Yes   Is the patient having any side effects they believe may be caused by any medication additions or changes? No   Does the patient have all medications related to this admission filled (includes all antibiotics, pain medications, etc.) Yes   Is the patient taking all medications as directed (includes completed medication regime)? Yes   Comments Hospital d/c f/u appt on 3/20/25 @9:15am   Does the patient have an appointment with their PCP within 7-14 days of discharge? Yes   Has home health visited the patient within 72 hours of discharge? N/A   Psychosocial issues? No   Did the patient receive a copy of their discharge instructions? Yes   Nursing interventions Reviewed instructions with patient   What is the patient's perception of their health status since discharge? Improving   Nursing interventions Nurse provided patient education   Is the patient /caregiver able to teach back basic post-op care? Lifting as instructed by MD in discharge instructions, Keep incision areas clean,dry and protected, No tub bath, swimming, or hot tub until instructed by MD, Take showers only when approved by MD-sponge bathe until then   Is the patient/caregiver able to teach back signs and symptoms of incisional infection? Increased redness, swelling or pain at the incisonal site, Increased drainage or bleeding   Is the patient/caregiver able to teach back steps to recovery at home? Set small, achievable goals for return to baseline health   Is the patient/caregiver able to teach  back the hierarchy of who to call/visit for symptoms/problems? PCP, Specialist, Home health nurse, Urgent Care, ED, 911 Yes   TCM call completed? Yes   Call end time 0930   Would this patient benefit from a Referral to [facility] Social Work? No   Is the patient interested in additional calls from an ambulatory ? No            Juliette Boss RN    3/15/2025, 09:30 EDT

## 2025-03-18 NOTE — PAYOR COMM NOTE
"Ref# [id number]   Discharge Summary    TRISTIAN Owens, RN  Utilization Review  Phone [phone]  Fax [phone]    [place] [address]         Dionne Orosco (49 y.o. Male)       Date of Birth   1975    Social Security Number       Address   [address] [place] [address]    Home Phone   [phone]    MRN   [id number]       Restoration   None    Marital Status   Single                            Admission Date   3/11/2025    Admission Type   Elective    Admitting Provider   Cora Tan MD    Attending Provider       Department, Room/Bed   [address], S587/1       Discharge Date   3/13/2025    Discharge Disposition   Home or Self Care    Discharge Destination                                 Attending Provider: (none)   Allergies: No Known Allergies    Isolation: None   Infection: None   Code Status: Prior    Ht: 188 cm (74\")   Wt: 101 kg (222 lb)    Admission Cmt: None   Principal Problem: S/P colectomy (Robotic LAR, I and D of pelvic abscess, left ureterolysis) [Z90.49]                   Active Insurance as of 3/11/2025       Primary Coverage       Payor Plan Insurance Group Employer/Plan Group    [employer] PPO [id number]       Payor Plan Address Payor Plan Phone Number Payor Plan Fax Number Effective Dates    [address] [phone]  2016 - None Entered    [place] [address]         Subscriber Name Subscriber Birth Date Member ID       DIONNE OROSCO 1975 [id number]                     Emergency Contacts        (Rel.) Home Phone Work Phone Mobile Phone    Autumn Vázquez (Significant Other) -- -- [phone]                 Discharge Summary        Les Farfan MD at 25 1434            Patient Name: Dionne Orosco  MRN: [id number]  : 1975  DOS: 3/13/2025    Attending: Cora Tan MD    Primary Care Provider: Macey Brewer " TONO Pope    Date of Admission:.3/11/2025  8:06 AM    Date of Discharge:  3/13/2025    Discharge Diagnosis:     S/P colectomy (Robotic LAR, I and D of pelvic abscess, left ureterolysis)    Hyperlipidemia    Gastroesophageal reflux disease    Restless leg syndrome    PVD (peripheral vascular disease)    Diverticulosis large intestine w/o perforation or abscess w/o bleeding    Sigmoid diverticulitis, recurrent    Acute postoperative pain    Leukocytosis, likely reactive      Hospital Course  At admit    Patient is a pleasant 49 y.o. male presented for scheduled surgery by Dr. Tan..   He has history of diverticulitis and intermittent left lower quadrant pain. He has history of polyps with colonoscopy in 2018. He had colonoscopy December 2024 that showed diverticulosis in sigmoid colon and descending colon as well as internal hemorrhoids. He denies any recent difficulty with constipation, diarrhea or bloody stools.      Today he underwent the following procedures:  Robotic low anterior resection with transanal extraction of the specimen.  Robotic incision and drainage of pelvic abscess.  Robotic left ureterolysis  He also underwent cystourethroscopy and intraoperative bilateral catheter placement, by urology     He tolerated surgery well and is admitted for further management.     Seen in PACU, drowsy but appropriate.  No nausea, vomiting, or shortness of breath.    After admit    He was provided pain medication as needed for pain control.    Patient received DVT prophylaxis with subcutaneous Lovenox as well as mechanicals      Patient was started on clear liquid diet, this was advanced when he showed evidence of bowel function return.      Tolerated diet without difficulty.    During his stay patient used an IS for atelectasis prophylaxis.    Home medications were resumed as appropriate, and labs were monitored and remained fairly stable.    With the progress he has made, pt is ready for DC home today.      Discussed  "with patient regarding plan and he shows understanding and agreement.       Procedures Performed  Procedure(s):  COLON RESECTION LOW ANTERIOR LAPAROSCOPIC, TRANSANAL EXTRACTION OF SPECIMEN WITH DAVINCI ROBOT  CYSTOSCOPY TEMPORARY PLACEMENT BILATERAL URETERAL CATHETER       Pertinent Test Results:    I reviewed the patient's new clinical results.   Results from last 7 days   Lab Units 25  0434 253 25  1412   WBC 10*3/mm3 15.20* 21.36*  --    HEMOGLOBIN g/dL 12.4* 15.3 15.2   HEMATOCRIT % 39.2 45.8 46.7   PLATELETS 10*3/mm3 243 284  --      Results from last 7 days   Lab Units 25  0413 25  0957   SODIUM mmol/L 137  --    POTASSIUM mmol/L 4.7 4.0   CHLORIDE mmol/L 102  --    CO2 mmol/L 23.0  --    BUN mg/dL 11  --    CREATININE mg/dL 0.84  --    CALCIUM mg/dL 8.4*  --    GLUCOSE mg/dL 139*  --      I reviewed the patient's new imaging including images and reports.       Discharge Assessment:       Visit Vitals  /67 (BP Location: Left arm, Patient Position: Lying)   Pulse 82   Temp 97.5 °F (36.4 °C) (Oral)   Resp 16   Ht 188 cm (74\")   SpO2 93%   BMI 28.50 kg/m²     Temp (24hrs), Av.8 °F (36.6 °C), Min:97.5 °F (36.4 °C), Max:98.2 °F (36.8 °C)      General Appearance:    Alert, cooperative, in no acute distress   Lungs:     Clear to auscultation,respirations regular, even and                   unlabored    Heart:    Regular rhythm and normal rate, normal S1 and S2    Abdomen:   Soft and benign with clean incisions   Extremities:   Moves all extremities well, no edema, no cyanosis, no              redness   Pulses:   Pulses palpable and equal bilaterally   Skin:   No bleeding, bruising or rash            Discharge Disposition:  Home        Discharge Medications        New Medications        Instructions Start Date   amoxicillin-clavulanate 875-125 MG per tablet  Commonly known as: AUGMENTIN   1 tablet, Oral, 2 Times Daily      HYDROcodone-acetaminophen 7.5-325 MG per " tablet  Commonly known as: NORCO   1 tablet, Oral, Every 4 Hours PRN             Continue These Medications        Instructions Start Date   albuterol sulfate  (90 Base) MCG/ACT inhaler  Commonly known as: PROVENTIL HFA;VENTOLIN HFA;PROAIR HFA   2 puffs, Inhalation, Every 4 Hours PRN      cyclobenzaprine 5 MG tablet  Commonly known as: FLEXERIL   5 mg, Oral, 3 Times Daily PRN      ferrous gluconate 324 MG tablet  Commonly known as: FERGON   324 mg, Oral, Daily With Breakfast      fexofenadine 180 MG tablet  Commonly known as: Allegra Allergy   180 mg, Oral, Daily      gabapentin 100 MG capsule  Commonly known as: NEURONTIN   100 mg, Oral, Nightly PRN             Stop These Medications      cefdinir 300 MG capsule  Commonly known as: OMNICEF     dicyclomine 20 MG tablet  Commonly known as: BENTYL     LORazepam 0.5 MG tablet  Commonly known as: Ativan     metroNIDAZOLE 500 MG tablet  Commonly known as: FLAGYL     neomycin 500 MG tablet  Commonly known as: MYCIFRADIN              Discharge Diet: Low fiber diet    Activity at Discharge: Ambulate.  Restrictions per Dr. Tan       Future Appointments   Date Time Provider Department Center   6/4/2025  8:00 AM Mariposa Atkinson APRN [facility]      Cora Tan MD per her orders    Discharge took over 30 min    Dragon disclaimer:  Part of this encounter note is an electronic transcription/translation of spoken language to printed text. The electronic translation of spoken language may permit erroneous, or at times, nonsensical words or phrases to be inadvertently transcribed; Although I have reviewed the note for such errors, some may still exist.       Les Farfan MD  03/13/25  14:34 EDT          Electronically signed by Les Farfan MD at 03/14/25 0830       Discharge Order (From admission, onward)       Start     Ordered    03/13/25 1432  Discharge patient  Once        Expected Discharge Date: 03/13/25   Expected Discharge Time: Midday    Discharge Disposition: Home or Self Care   Physician of Record for Attribution - Please select from Treatment Team: ANDREW LYLES [[id number]]   Review needed by CMO to determine Physician of Record: No      Question Answer Comment   Physician of Record for Attribution - Please select from Treatment Team ANDREW LYLES    Review needed by CMO to determine Physician of Record No        03/13/25 8307

## 2025-03-20 ENCOUNTER — OFFICE VISIT (OUTPATIENT)
Dept: FAMILY MEDICINE CLINIC | Facility: CLINIC | Age: 50
End: 2025-03-20
Payer: COMMERCIAL

## 2025-03-20 VITALS
RESPIRATION RATE: 18 BRPM | BODY MASS INDEX: 27.46 KG/M2 | TEMPERATURE: 96.5 F | OXYGEN SATURATION: 93 % | WEIGHT: 214 LBS | DIASTOLIC BLOOD PRESSURE: 74 MMHG | HEIGHT: 74 IN | SYSTOLIC BLOOD PRESSURE: 112 MMHG | HEART RATE: 75 BPM

## 2025-03-20 DIAGNOSIS — Z23 NEED FOR PROPHYLACTIC VACCINATION AGAINST STREPTOCOCCUS PNEUMONIAE (PNEUMOCOCCUS): ICD-10-CM

## 2025-03-20 DIAGNOSIS — F32.A ANXIETY AND DEPRESSION: Chronic | ICD-10-CM

## 2025-03-20 DIAGNOSIS — F41.9 ANXIETY AND DEPRESSION: Chronic | ICD-10-CM

## 2025-03-20 DIAGNOSIS — K57.92 DIVERTICULITIS: Primary | ICD-10-CM

## 2025-03-20 DIAGNOSIS — Z23 NEED FOR INFLUENZA VACCINATION: ICD-10-CM

## 2025-03-20 RX ORDER — ESCITALOPRAM OXALATE 10 MG/1
10 TABLET ORAL DAILY
Qty: 30 TABLET | Refills: 1 | Status: SHIPPED | OUTPATIENT
Start: 2025-03-20

## 2025-03-20 NOTE — PROGRESS NOTES
"Subjective   Devan Alarcon is a 49 y.o. male.     Chief Complaint   Patient presents with    Hospital Follow Up Visit    Anxiety    Depression    Diverticulitis       History of Present Illness  He presents for hospital follow up of colectomy.  He had a colectomy with 14 cm of bowel removed discharge summary reviewed.  He states he is eating and drinking well.  He states he is having bowel movements without blood.  He states he is voiding well without problem.  He also c/o anxiety and depression. He tried effexor in the past and couldn't tolerate it. Amitriptyline helped for a while but stopped helping.  He states he does have suicidal ideations but he does not have a plan to do so.  He is states he has many things keeping him from committing suicide.  He states his dad had a mood disorder as well.       The following portions of the patient's history were reviewed and updated as appropriate: allergies, current medications, past family history, past medical history, past social history, past surgical history and problem list.    Review of Systems   Constitutional:  Negative for fever.   Respiratory:  Negative for cough, shortness of breath and wheezing.    Cardiovascular:  Negative for chest pain and palpitations.   Gastrointestinal:  Positive for diarrhea. Negative for blood in stool, constipation, nausea and vomiting.   Genitourinary:  Negative for dysuria and hematuria.   Psychiatric/Behavioral:  Positive for suicidal ideas. The patient is nervous/anxious.        Objective     /74 (BP Location: Right arm, Patient Position: Sitting, Cuff Size: Large Adult)   Pulse 75   Temp 96.5 °F (35.8 °C) (Tympanic)   Resp 18   Ht 188 cm (74.02\")   Wt 97.1 kg (214 lb)   SpO2 93%   BMI 27.46 kg/m²     Physical Exam  Vitals reviewed.   Constitutional:       General: He is not in acute distress.     Appearance: He is well-developed. He is not diaphoretic.   HENT:      Head: Normocephalic and atraumatic. "   Cardiovascular:      Rate and Rhythm: Normal rate and regular rhythm.      Heart sounds: Normal heart sounds. No murmur heard.     No friction rub. No gallop.   Pulmonary:      Effort: Pulmonary effort is normal. No respiratory distress.      Breath sounds: Normal breath sounds.   Abdominal:      General: Bowel sounds are normal. There is no distension.      Palpations: Abdomen is soft.      Tenderness: There is no abdominal tenderness.   Skin:     General: Skin is warm and dry.      Comments: 6 small incisions on the abdomen healing well free of erythema, edema, and drainage.   Neurological:      Mental Status: He is alert and oriented to person, place, and time.   Psychiatric:         Behavior: Behavior normal.         Thought Content: Thought content normal.         Judgment: Judgment normal.         Current Outpatient Medications   Medication Sig Dispense Refill    albuterol sulfate  (90 Base) MCG/ACT inhaler Inhale 2 puffs Every 4 (Four) Hours As Needed for Wheezing. 18 g 2    cyclobenzaprine (FLEXERIL) 5 MG tablet Take 1 tablet by mouth 3 (Three) Times a Day As Needed for Muscle Spasms. 90 tablet 5    ferrous gluconate (FERGON) 324 MG tablet Take 1 tablet by mouth Daily With Breakfast. 30 tablet 5    fexofenadine (Allegra Allergy) 180 MG tablet Take 1 tablet by mouth Daily. (Patient taking differently: Take 1 tablet by mouth Daily As Needed (allergies).) 30 tablet 5    gabapentin (NEURONTIN) 100 MG capsule Take 1 capsule by mouth At Night As Needed (restless leg syndrome). 30 capsule 2    HYDROcodone-acetaminophen (NORCO) 7.5-325 MG per tablet Take 1 tablet by mouth Every 4 (Four) Hours As Needed for Moderate Pain. 15 tablet 0    escitalopram (Lexapro) 10 MG tablet Take 1 tablet by mouth Daily. 30 tablet 1     No current facility-administered medications for this visit.            Assessment & Plan     Problem List Items Addressed This Visit    None  Visit Diagnoses         Need for prophylactic  vaccination against Streptococcus pneumoniae (pneumococcus)    -  Primary    Relevant Orders    Pneumococcal Conjugate Vaccine 20-Valent (PCV20) (Completed)      Anxiety and depression        Relevant Medications    escitalopram (Lexapro) 10 MG tablet    Other Relevant Orders    Ambulatory Referral to Psychiatry      Need for influenza vaccination        Relevant Orders    Fluzone >6mos (Completed)              ICD-10-CM ICD-9-CM   1. Need for prophylactic vaccination against Streptococcus pneumoniae (pneumococcus)  Z23 V03.82   2. Anxiety and depression  F41.9 300.00    F32.A 311   3. Need for influenza vaccination  Z23 V04.81       Plan: Follow-up with surgeon.  Continue surgeons recommendations.  Flu and pneumonia vaccines given with his consent today.  Start Lexapro for anxiety and depression.Be patient this may take a couple weeks for you to notice a difference. This type of medication may come with increased risk of suicidal ideations. If you develop suicidal thoughts, stop the medication and seek medical attention immediately and help from a friend or family member.  He verbalized an understanding of the same.  Refer to psych at his request.  Keep scheduled follow-up and follow-up as needed.  Follow-up in 1 month if you do not see psych before then.      @Body mass index is 27.46 kg/m².     Devan Alarcon       Understands disease processes and need for medications.  Understands reasons for urgent and emergent care.  Patient (& family) verbalized agreement for treatment plan.   Emotional support and active listening provided.  Patient provided time to verbalize feelings.                  This document has been electronically signed by TONO Butts   March 20, 2025 10:26 EDT

## 2025-03-20 NOTE — PROGRESS NOTES
Immunization  Immunization performed in right and left deltoid by Lila Arambula MA. Patient tolerated the procedure well without complications.  03/20/25   Lila Arambula MA

## 2025-03-26 ENCOUNTER — OFFICE VISIT (OUTPATIENT)
Dept: PSYCHIATRY | Facility: CLINIC | Age: 50
End: 2025-03-26
Payer: COMMERCIAL

## 2025-03-26 VITALS
OXYGEN SATURATION: 96 % | SYSTOLIC BLOOD PRESSURE: 160 MMHG | HEIGHT: 74 IN | WEIGHT: 218 LBS | HEART RATE: 81 BPM | DIASTOLIC BLOOD PRESSURE: 100 MMHG | BODY MASS INDEX: 27.98 KG/M2

## 2025-03-26 DIAGNOSIS — F33.1 MODERATE EPISODE OF RECURRENT MAJOR DEPRESSIVE DISORDER: Primary | ICD-10-CM

## 2025-03-26 DIAGNOSIS — Z79.899 MEDICATION MANAGEMENT: ICD-10-CM

## 2025-03-26 DIAGNOSIS — F41.1 GAD (GENERALIZED ANXIETY DISORDER): ICD-10-CM

## 2025-03-26 LAB
AMPHET+METHAMPHET UR QL: NEGATIVE
AMPHETAMINES UR QL: NEGATIVE
BARBITURATES UR QL SCN: NEGATIVE
BENZODIAZ UR QL SCN: POSITIVE
BUPRENORPHINE SERPL-MCNC: NEGATIVE NG/ML
CANNABINOIDS SERPL QL: NEGATIVE
COCAINE UR QL: NEGATIVE
FENTANYL UR-MCNC: NEGATIVE NG/ML
METHADONE UR QL SCN: NEGATIVE
OPIATES UR QL: POSITIVE
OXYCODONE UR QL SCN: NEGATIVE
PCP UR QL SCN: NEGATIVE
TRICYCLICS UR QL SCN: POSITIVE

## 2025-03-26 PROCEDURE — 80307 DRUG TEST PRSMV CHEM ANLYZR: CPT

## 2025-03-26 NOTE — PROGRESS NOTES
Subjective     Devan Alarcon is a 49 y.o. male who presents today for initial evaluation     Chief Complaint:  Anxiety and depression     History of Present Illness:    This is a new patient, here today for evaluation. He was referred by his primary care provider. He has been started on escitalopram 10 mg po daily one week ago.    Here today with complaints of Depression and Anxiety    He reports having problems with anxiety and depression before his teen years. He states that he has lived with anxiety and depression his whole life. He reports seeking help now because he feels the symptoms are getting worse and he does not want to struggle with these feeling by himself.  He reports primary care has prescribed medication for mirtazapine, currently on lexapro    Elavil for migraines and helped with mood.     Details:  Depression is rated at 4/10, with 10 being the worst. PHQ-9 score: 10  Patient reports having a depressed mood and anhedonia. He has had some fatigue and decrease in motivation, and energy but he had a resection of his colon on 3/10/25. He denies feeling hopeless, helpless, worthless, or powerless. He reports sometimes feeling it would be better if he were dead- he denies having suicidal thoughts, plan or intent. He reports having these thoughts since he was 18-20 years old. These symptoms cause impairment in important areas of functioning.    Anxiety is rated at 6/10, with 10 being the worst. MARIA FERNANDA-7 score: 15  He reports symptoms which include excessive anxiety, excessive worry, and difficulty controlling worry. He has worried about not being able to do the things he use to do for people, financial situation, relationship and everyday life.- work, being productive, quality of work.  He reports frequently feeling overwhelmed with life. He reports feeling on edge, irritability and fatigue. These symptoms cause impairment in important areas of functioning.    Patient denies having anxiety  attacks    Sleep is fair, Since his surgery he has been sleeping more. But he was typically sleeping 5-6 hours per night. He reports on the weekend when he was feeling down he would sleep more.  Nightmares: Denies    Appetite is good. He reports eating regular meals and eating small amounts frequently since the surgery. He reports drinking coffee in the morning several times a week.     Patient denies significant anger, irritability, patricia, hypomania, OCD, ADHD, PTSD, eating disorders or hx of seizures.     Patient denies SI/HI, A/V hallucinations, or delusions.    Past Psychiatric History:  He reports having problems with anxiety and depression before his teen years. He states that he has lived with anxiety and depression his whole life. He reports seeking help now because he feels the symptoms are getting worse and he does not want to struggle with these feeling by himself.  He reports primary care has prescribed medication for mirtazapine, currently on lexapro  Outpatient treatment: none, he has had medication prescribed by PCP   Diagnoses: no formal diagnosis  Admission History:Denies  Medication Trials: see below   Suicide Attempts:Denies  Self Harm: Denies  Risky behaviors None  Prior abuse: None  Trauma: none  Family psych history: father had a mood disorder    Previous psychiatric medications include:   Antidepressants: 10/2024 mirtazapine- knocked him out and miss work. Elavil: for headaches and mood. 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 He smokes more at work, and 1-2 when at home    Social history:   His parents  when he was very young.   Patient was born in [place], [place]. He moved around a lot when  he young. HE lived with his mother primarily. He moved to [place] this last time in 2005.   Currently living with significant other/ fiancee for the last 15 years.   Children: None, he reports raising his third cousin from age 8 to age 17  Education: HS graduate.   Employment: employed, He has worked at [employer] for 20 years  : none  Legal: none  Taoist preference: He believes in God.         Hobbies/Outside activities: Likes to work and work with his hands, Yard work, quilting, metal working and working on cars     Chronic health issues, no acute physical or medical issues today.    The following portions of the patient's history were reviewed and updated as appropriate: allergies, current medications, past family history, past medical history, past social history, past surgical history and problem list.      Past Psychiatric History:    Past Medical History:  Past Medical History:   Diagnosis Date    Anxiety     Arm fracture     left arm     Arthritis     knee and hands    Depression     Diverticulitis     Esophageal cancer     Oct 2018, esophagectomy    GERD (gastroesophageal reflux disease)     History of migraine headaches     Hyperlipidemia     Hyperlipidemia 01/20/2017    RLS (restless legs syndrome)        Social History:  Social History     Socioeconomic History    Marital status: Significant Other     Spouse name: Autumn Vázquez    Number of children: 0    Highest education level: High school graduate   Tobacco Use    Smoking status: Every Day     Current packs/day: 1.00     Average packs/day: 1 pack/day for 25.9 years (25.9 ttl pk-yrs)     Types: Pipe, Cigarettes     Start date: 10/1/2015    Smokeless tobacco: Never    Tobacco comments:     Quit cigarettes ~2018   Vaping Use    Vaping status: Never Used   Substance and Sexual Activity    Alcohol use: No    Drug use: No    Sexual activity: Defer     Partners: Female       Family History:  Family History   Problem Relation Age of Onset    Osteoporosis  Mother     Anxiety disorder Father     Depression Father     Hypertension Father     Drug abuse Brother     Depression Brother     Anxiety disorder Brother        Past Surgical History:  Past Surgical History:   Procedure Laterality Date    COLON RESECTION N/A 3/11/2025    Procedure: COLON RESECTION LOW ANTERIOR LAPAROSCOPIC, TRANSANAL EXTRACTION OF SPECIMEN WITH DAVINCI ROBOT;  Surgeon: Cora Tan MD;  Location:  [facility];  Service: Robotics - DaVinci;  Laterality: N/A;    COLONOSCOPY      COLONOSCOPY N/A 11/10/2021    Procedure: COLONOSCOPY;  Surgeon: Joi Bains MD;  Location:  [facility];  Service: Gastroenterology;  Laterality: N/A;    COLONOSCOPY N/A 12/19/2024    Procedure: COLONOSCOPY;  Surgeon: Joi Bains MD;  [facility]:  [facility];  Service: Gastroenterology;  Laterality: N/A;    CYSTOSCOPY Bilateral 3/11/2025    Procedure: CYSTOSCOPY TEMPORARY PLACEMENT BILATERAL URETERAL CATHETER;  Surgeon: Jhony Garcia MD;  Location:  [facility];  Service: Urology;  Laterality: Bilateral;    ENDOSCOPY N/A 9/20/2018    Procedure: ESOPHAGOGASTRODUODENOSCOPY WITH BIOPSY CPT CODE: 65568;  Surgeon: Baron Mccall MD;  Location:  [facility];  Service: Gastroenterology    ENDOSCOPY N/A 10/17/2018    Procedure: ESOPHAGOGASTRODUODENOSCOPY WITH BIOPSY CPT CODE: 99409;  Surgeon: Baron Mccall MD;  [facility]:  [facility];  Service: Gastroenterology    ENDOSCOPY      with ablation    ESOPHAGECTOMY      MOUTH SURGERY      UPPER GASTROINTESTINAL ENDOSCOPY      VENTRAL/INCISIONAL HERNIA REPAIR N/A 7/12/2019    Procedure: VENTRAL/INCISIONAL HERNIA REPAIR LAPAROSCOPIC;  Surgeon: Jaswinder Goodrich MD;  Location:  [facility];  Service: General       Problem List:  Patient Active Problem List   Diagnosis    Chronic headache    Hyperlipidemia    Gastroesophageal reflux disease    History of esophagectomy    Paroxysmal atrial fibrillation    Restless leg syndrome    Personal history of esophageal  cancer    Status post laparoscopic hernia repair    Diverticulosis of large intestine    Diverticulitis    Pain in both lower extremities    PVD (peripheral vascular disease)    Blood in stool    Iron deficiency anemia    Encounter for screening for malignant neoplasm of colon    Personal history of colon polyps, unspecified    Diverticulosis large intestine w/o perforation or abscess w/o bleeding    Sigmoid diverticulitis, recurrent    S/P colectomy (Robotic LAR, I and D of pelvic abscess, left ureterolysis)    Acute postoperative pain    Leukocytosis, likely reactive    Anxiety and depression       Allergy:   No Known Allergies     Current Medications:   Current Outpatient Medications   Medication Sig Dispense Refill    albuterol sulfate  (90 Base) MCG/ACT inhaler Inhale 2 puffs Every 4 (Four) Hours As Needed for Wheezing. 18 g 2    cyclobenzaprine (FLEXERIL) 5 MG tablet Take 1 tablet by mouth 3 (Three) Times a Day As Needed for Muscle Spasms. 90 tablet 5    escitalopram (Lexapro) 10 MG tablet Take 1 tablet by mouth Daily. 30 tablet 1    ferrous gluconate (FERGON) 324 MG tablet Take 1 tablet by mouth Daily With Breakfast. 30 tablet 5    fexofenadine (Allegra Allergy) 180 MG tablet Take 1 tablet by mouth Daily. (Patient taking differently: Take 1 tablet by mouth Daily As Needed (allergies).) 30 tablet 5    gabapentin (NEURONTIN) 100 MG capsule Take 1 capsule by mouth At Night As Needed (restless leg syndrome). 30 capsule 2    HYDROcodone-acetaminophen (NORCO) 7.5-325 MG per tablet Take 1 tablet by mouth Every 4 (Four) Hours As Needed for Moderate Pain. 15 tablet 0     No current facility-administered medications for this visit.       Review of Symptoms:    Review of Systems   Constitutional:  Positive for fatigue.   Eyes: Negative.    Respiratory: Negative.     Cardiovascular: Negative.    Gastrointestinal:  Positive for abdominal distention and abdominal pain.        Resection of 14 cm of his colon removed:  "3/11/25   Endocrine: Negative.    Musculoskeletal: Negative.    Skin: Negative.    Allergic/Immunologic: Negative.    Neurological: Negative.    Hematological: Negative.    Psychiatric/Behavioral:  Positive for dysphoric mood, sleep disturbance, depressed mood and stress. The patient is nervous/anxious.        Objective     Physical Exam:   Physical Exam  Constitutional:       Appearance: Normal appearance.   Eyes:      Pupils: Pupils are equal, round, and reactive to light.   Cardiovascular:      Rate and Rhythm: Normal rate.   Pulmonary:      Effort: Pulmonary effort is normal.   Musculoskeletal:         General: Normal range of motion.      Cervical back: Normal range of motion.   Skin:     General: Skin is warm and dry.   Neurological:      General: No focal deficit present.      Mental Status: He is alert and oriented to person, place, and time.   Psychiatric:         Attention and Perception: Attention and perception normal.         Mood and Affect: Affect normal. Mood is anxious and depressed.         Speech: Speech normal.         Behavior: Behavior normal. Behavior is cooperative.         Thought Content: Thought content normal.         Cognition and Memory: Cognition and memory normal.         Judgment: Judgment normal.       Vitals:  Blood pressure 160/100, pulse 81, height 188 cm (74.02\"), weight 98.9 kg (218 lb), SpO2 96%.   Body mass index is 27.98 kg/m².    Wt Readings from Last 3 Encounters:   03/26/25 98.9 kg (218 lb)   03/20/25 97.1 kg (214 lb)   03/07/25 101 kg (222 lb)       Last 3 Blood Pressure and Pulse Readings:  BP Readings from Last 3 Encounters:   03/26/25 160/100   03/20/25 112/74   03/13/25 123/67     Pulse Readings from Last 3 Encounters:   03/26/25 81   03/20/25 75   03/13/25 82       PHQ-9 Score: March 26, 2025  Little interest or pleasure in doing things? Nearly every day   Feeling down, depressed, or hopeless? Nearly every day   PHQ-2 Total Score 6   Trouble falling or staying " asleep, or sleeping too much? Not at all   Feeling tired or having little energy? More than half the days   Poor appetite or overeating? Not at all   Feeling bad about yourself - or that you are a failure or have let yourself or your family down? Several days   Trouble concentrating on things, such as reading the newspaper or watching television? Not at all   Moving or speaking so slowly that other people could have noticed? Or the opposite - being so fidgety or restless that you have been moving around a lot more than usual? Not at all     Thoughts that you would be better off dead, or of hurting yourself in some way? Several days   PHQ-9 Total Score 10   If you checked off any problems, how difficult have these problems made it for you to do your work, take care of things at home, or get along with other people? Somewhat difficult          MARIA FERNANDA-7 Score: March 26, 2025  Feeling nervous, anxious or on edge: Nearly every day  Not being able to stop or control worrying: Nearly every day  Worrying too much about different things: Nearly every day  Trouble Relaxing: Several days  Being so restless that it is hard to sit still: More than half the days  Feeling afraid as if something awful might happen: Not at all  Becoming easily annoyed or irritable: Nearly every day  MARIA FERNANDA 7 Total Score: 15  If you checked any problems, how difficult have these problems made it for you to do your work, take care of things at home, or get along with other people: Somewhat difficult     Appearance: Patient is a 49-year-old  male.  He is casually dressed in jeans, hooded sweatshirt,  and tennis shoes.  His brown hair is short and covered by a ball cap.  His mustache is neatly trimmed.  He is appropriately dressed for his age and the weather.  Gait, Station, Strength: WNL       Mental Status Exam:   Hygiene:   good  Cooperation:  Cooperative  Eye Contact:  Good  Psychomotor Behavior:  Restless  Affect: Appropriate   Mood: depressed  and anxious  Hopelessness: Denies  Speech:  Rambling  Thought Process:  Goal directed  Thought Content:  Mood congruent  Suicidal:  None  Homicidal:  None  Hallucinations:  None  Delusion:  None  Memory:  Intact  Orientation:  Person, Place, Time, and Situation  Reliability:  good  Insight:  Fair  Judgement:  Fair  Impulse Control:  Fair  Physical/Medical Issues:  Yes , see chart        Lab Results:   Admission on 03/11/2025, Discharged on 03/13/2025   Component Date Value Ref Range Status    Potassium 03/11/2025 4.0  3.5 - 5.2 mmol/L Final    Slight hemolysis detected by analyzer. Result may be falsely elevated.    ABO Type 03/11/2025 O   Final    RH type 03/11/2025 Positive   Final    Antibody Screen 03/11/2025 Negative   Final    T&S Expiration Date 03/11/2025 3/14/2025 11:59:59 PM   Final    Case Report 03/11/2025    Final                    Value:Surgical Pathology Report                         Case: [id number]                                  Authorizing Provider:  Cora Tan MD        Collected:           03/11/2025 12:56 PM          Ordering Location:     [facility]   Received:            03/11/2025 02:00 PM                                 OR                                                                           Pathologist:           Rachelle Ibarra MD                                                          Specimen:    Large Intestine, Sigmoid Colon, Sigmoid and Upper Rectum                                   Clinical Information 03/11/2025    Final                    Value:Diverticulitis      Final Diagnosis 03/11/2025    Final                    Value:Sigmoid colon, segmental resection:  Diverticulosis coli with associated intramural abscess formation  Examined margins of resection (anastomotic rings) are viable  No identified dysplasia or malignancy      Gross Description 03/11/2025    Final                    Value:1. Large Intestine, Sigmoid Colon.  Received in formalin  "labeled \"sigmoid and upper rectum\" is a 14 x 14 x 5 cm aggregate of markedly disrupted fragments of colon, which are undesignated and unoriented.  Stapled margins are not identified.  The identifiable serosa is tan-gray and has dense adhesions and hemorrhage.  2 possible anastomotic rings are identified averaging 2 x 2 x 1 cm.  The colon fragments have tan, glistening mucosa with a normal folding pattern.  A single possible polyp is identified, 0.2 cm in diameter.  Sectioning of the fragments reveals multiple perforated and nonperforated diverticula.  The pericolonic fat is focally indurated and the bowel wall is diffusely thickened up to 1.0 cm.  Representative sections are submitted as follows:  1A-possible polyp, submitted entirely  [id number] with adhesions and indurated fat  1E-anastomotic tissue.  LDP        Microscopic Description 03/11/2025    Final                    Value:The slides are reviewed and demonstrate histopathologic features supporting the above rendered diagnosis.        Hemoglobin 03/11/2025 15.2  13.0 - 17.7 g/dL Final    Hematocrit 03/11/2025 46.7  37.5 - 51.0 % Final    Glucose 03/11/2025 181 (H)  70 - 130 mg/dL Final    Glucose 03/11/2025 223 (H)  70 - 130 mg/dL Final    Glucose 03/12/2025 139 (H)  65 - 99 mg/dL Final    BUN 03/12/2025 11  6 - 20 mg/dL Final    Creatinine 03/12/2025 0.84  0.76 - 1.27 mg/dL Final    Sodium 03/12/2025 137  136 - 145 mmol/L Final    Potassium 03/12/2025 4.7  3.5 - 5.2 mmol/L Final    Chloride 03/12/2025 102  98 - 107 mmol/L Final    CO2 03/12/2025 23.0  22.0 - 29.0 mmol/L Final    Calcium 03/12/2025 8.4 (L)  8.6 - 10.5 mg/dL Final    BUN/Creatinine Ratio 03/12/2025 13.1  7.0 - 25.0 Final    Anion Gap 03/12/2025 12.0  5.0 - 15.0 mmol/L Final    eGFR 03/12/2025 106.2  >60.0 mL/min/1.73 Final    Magnesium 03/12/2025 1.9  1.6 - 2.6 mg/dL Final    WBC 03/12/2025 21.36 (H)  3.40 - 10.80 10*3/mm3 Final    RBC 03/12/2025 5.04  4.14 - 5.80 10*6/mm3 Final    " Hemoglobin 03/12/2025 15.3  13.0 - 17.7 g/dL Final    Hematocrit 03/12/2025 45.8  37.5 - 51.0 % Final    MCV 03/12/2025 90.9  79.0 - 97.0 fL Final    MCH 03/12/2025 30.4  26.6 - 33.0 pg Final    MCHC 03/12/2025 33.4  31.5 - 35.7 g/dL Final    RDW 03/12/2025 14.2  12.3 - 15.4 % Final    RDW-SD 03/12/2025 47.4  37.0 - 54.0 fl Final    MPV 03/12/2025 10.4  6.0 - 12.0 fL Final    Platelets 03/12/2025 284  140 - 450 10*3/mm3 Final    Neutrophil % 03/12/2025 89.2 (H)  42.7 - 76.0 % Final    Lymphocyte % 03/12/2025 4.9 (L)  19.6 - 45.3 % Final    Monocyte % 03/12/2025 5.3  5.0 - 12.0 % Final    Eosinophil % 03/12/2025 0.0 (L)  0.3 - 6.2 % Final    Basophil % 03/12/2025 0.1  0.0 - 1.5 % Final    Immature Grans % 03/12/2025 0.5  0.0 - 0.5 % Final    Neutrophils, Absolute 03/12/2025 19.05 (H)  1.70 - 7.00 10*3/mm3 Final    Lymphocytes, Absolute 03/12/2025 1.05  0.70 - 3.10 10*3/mm3 Final    Monocytes, Absolute 03/12/2025 1.14 (H)  0.10 - 0.90 10*3/mm3 Final    Eosinophils, Absolute 03/12/2025 0.00  0.00 - 0.40 10*3/mm3 Final    Basophils, Absolute 03/12/2025 0.02  0.00 - 0.20 10*3/mm3 Final    Immature Grans, Absolute 03/12/2025 0.10 (H)  0.00 - 0.05 10*3/mm3 Final    nRBC 03/12/2025 0.0  0.0 - 0.2 /100 WBC Final    Glucose 03/11/2025 158 (H)  70 - 130 mg/dL Final    Glucose 03/12/2025 136 (H)  70 - 130 mg/dL Final    Glucose 03/12/2025 120  70 - 130 mg/dL Final    Glucose 03/12/2025 134 (H)  70 - 130 mg/dL Final    WBC 03/13/2025 15.20 (H)  3.40 - 10.80 10*3/mm3 Final    RBC 03/13/2025 4.20  4.14 - 5.80 10*6/mm3 Final    Hemoglobin 03/13/2025 12.4 (L)  13.0 - 17.7 g/dL Final    Hematocrit 03/13/2025 39.2  37.5 - 51.0 % Final    MCV 03/13/2025 93.3  79.0 - 97.0 fL Final    MCH 03/13/2025 29.5  26.6 - 33.0 pg Final    MCHC 03/13/2025 31.6  31.5 - 35.7 g/dL Final    RDW 03/13/2025 14.6  12.3 - 15.4 % Final    RDW-SD 03/13/2025 50.4  37.0 - 54.0 fl Final    MPV 03/13/2025 10.1  6.0 - 12.0 fL Final    Platelets 03/13/2025 243   140 - 450 10*3/mm3 Final    Neutrophil % 03/13/2025 76.4 (H)  42.7 - 76.0 % Final    Lymphocyte % 03/13/2025 15.8 (L)  19.6 - 45.3 % Final    Monocyte % 03/13/2025 6.7  5.0 - 12.0 % Final    Eosinophil % 03/13/2025 0.4  0.3 - 6.2 % Final    Basophil % 03/13/2025 0.3  0.0 - 1.5 % Final    Immature Grans % 03/13/2025 0.4  0.0 - 0.5 % Final    Neutrophils, Absolute 03/13/2025 11.62 (H)  1.70 - 7.00 10*3/mm3 Final    Lymphocytes, Absolute 03/13/2025 2.40  0.70 - 3.10 10*3/mm3 Final    Monocytes, Absolute 03/13/2025 1.02 (H)  0.10 - 0.90 10*3/mm3 Final    Eosinophils, Absolute 03/13/2025 0.06  0.00 - 0.40 10*3/mm3 Final    Basophils, Absolute 03/13/2025 0.04  0.00 - 0.20 10*3/mm3 Final    Immature Grans, Absolute 03/13/2025 0.06 (H)  0.00 - 0.05 10*3/mm3 Final    nRBC 03/13/2025 0.0  0.0 - 0.2 /100 WBC Final    Glucose 03/12/2025 118  70 - 130 mg/dL Final    Glucose 03/13/2025 95  70 - 130 mg/dL Final    Glucose 03/13/2025 97  70 - 130 mg/dL Final   Pre-Admission Testing on 03/04/2025   Component Date Value Ref Range Status    QT Interval 03/04/2025 358  ms Final    QTC Interval 03/04/2025 389  ms Final    Hemoglobin A1C 03/04/2025 5.40  4.80 - 5.60 % Final    WBC 03/04/2025 8.27  3.40 - 10.80 10*3/mm3 Final    RBC 03/04/2025 5.57  4.14 - 5.80 10*6/mm3 Final    Hemoglobin 03/04/2025 16.1  13.0 - 17.7 g/dL Final    Hematocrit 03/04/2025 50.8  37.5 - 51.0 % Final    MCV 03/04/2025 91.2  79.0 - 97.0 fL Final    MCH 03/04/2025 28.9  26.6 - 33.0 pg Final    MCHC 03/04/2025 31.7  31.5 - 35.7 g/dL Final    RDW 03/04/2025 13.9  12.3 - 15.4 % Final    RDW-SD 03/04/2025 46.5  37.0 - 54.0 fl Final    MPV 03/04/2025 10.1  6.0 - 12.0 fL Final    Platelets 03/04/2025 278  140 - 450 10*3/mm3 Final    Glucose 03/04/2025 95  65 - 99 mg/dL Final    BUN 03/04/2025 11  6 - 20 mg/dL Final    Creatinine 03/04/2025 0.95  0.76 - 1.27 mg/dL Final    Sodium 03/04/2025 142  136 - 145 mmol/L Final    Potassium 03/04/2025 5.4 (H)  3.5 - 5.2 mmol/L  Final    Slight hemolysis detected by analyzer. Result may be falsely elevated.    Chloride 03/04/2025 102  98 - 107 mmol/L Final    CO2 03/04/2025 28.0  22.0 - 29.0 mmol/L Final    Calcium 03/04/2025 9.7  8.6 - 10.5 mg/dL Final    Total Protein 03/04/2025 7.4  6.0 - 8.5 g/dL Final    Albumin 03/04/2025 4.2  3.5 - 5.2 g/dL Final    ALT (SGPT) 03/04/2025 25  1 - 41 U/L Final    AST (SGOT) 03/04/2025 22  1 - 40 U/L Final    Alkaline Phosphatase 03/04/2025 89  39 - 117 U/L Final    Total Bilirubin 03/04/2025 0.5  0.0 - 1.2 mg/dL Final    Globulin 03/04/2025 3.2  gm/dL Final    Calculated Result    A/G Ratio 03/04/2025 1.3  g/dL Final    BUN/Creatinine Ratio 03/04/2025 11.6  7.0 - 25.0 Final    Anion Gap 03/04/2025 12.0  5.0 - 15.0 mmol/L Final    eGFR 03/04/2025 97.5  >60.0 mL/min/1.73 Final    CEA 03/04/2025 3.61  ng/mL Final    ABO Type 03/04/2025 O   Final    RH type 03/04/2025 Positive   Final         Assessment & Plan   Diagnoses and all orders for this visit:    1. Moderate episode of recurrent major depressive disorder (Primary)    2. MARIA FERNANDA (generalized anxiety disorder)    3. Medication management  -     Urine Drug Screen - Urine, Clean Catch; Future  -     Urine Drug Screen - Urine, Clean Catch        Visit Diagnoses:    ICD-10-CM ICD-9-CM   1. Moderate episode of recurrent major depressive disorder  F33.1 296.32   2. MARIA FERNANDA (generalized anxiety disorder)  F41.1 300.02   3. Medication management  Z79.899 V58.69       GOALS:  Short Term Goals: Patient will be compliant with medication, and patient will have no significant medication related side effects.  Patient will be engaged in psychotherapy as indicated.  Patient will report subjective improvement of symptoms.  Long term goals: To stabilize mood and treat/improve subjective symptoms, the patient will stay out of the hospital, the patient will be at an optimal level of functioning, and the patient will take all medications as prescribed.  The patient/guardian  verbalized understanding and agreement with goals that were mutually set.      TREATMENT PLAN:   Continue escitalopram (Lexapro) 10 mg p.o nighty for anxiety and depression. Patient has sufficient medication. He has been taking this for one week.  -Discussed supportive psychotherapy efforts to develop coping skills to manage stress and emotions. Patient declines at this time.   -Pharmacological and Non-Pharmacological treatment options discussed during today's visit.   -The benefits of a healthy diet and exercise were discussed with patient, especially the positive effects they have on mental health. Patient encouraged to consider lifestyle modification regarding  diet and exercise patterns to maximize results of mental health treatment.   -We discussed sleep hygiene including going to bed at the same time and getting up at the same time every day, decreased caffeine consumption, going to bed early enough to get 7 or 8 hours in bed, reading and relaxing before bedtime, and avoiding stimulating activities close to bedtime.   -Patient acknowledged and verbally consented with current treatment plan and was educated on the importance of compliance with treatment and follow-up appointments.    -Return to clinic in 4 weeks for follow up.  -Reviewed previous available documentation  -Reviewed most recent available labs  -DEONTE reviewed  -UDS: 3/26/25: + benzo, opiates and TCA    MEDICATION ISSUES:  -Discussed medication options and treatment plan of prescribed medication as well as the risks, benefits, any black box warnings, and side effects.   -I have explained to the patient drugs of the SSRI class can have side effects such as weight gain, sexual dysfunction, insomnia, headache, nausea. I advised the patient of the black box warning for all antidepressants is the increased risk of suicidal thoughts. In addition, he should monitor for signs of serotonin syndrome: shivering, vital sign instability, elevated temperature  and hyperreflexia.    -Patient is agreeable to call the office with any worsening of symptoms or onset of side effects, or if any concerns or questions arise.    -The contact information for the office is made available to the patient. Patient is agreeable to call 911 or go to the nearest ER should they begin having any SI/HI, or if any urgent concerns arise. No medication side effects or related complaints today.     MEDS ORDERED DURING VISIT:  No orders of the defined types were placed in this encounter.      MEDS DISCONTINUED DURING VISIT:   There are no discontinued medications.     Follow Up Appointment:   Return in about 4 weeks (around 4/23/2025) for Recheck.           This document has been electronically signed by TONO Etienne  March 26, 2025 10:31 EDT    Dictated Utilizing Dragon Dictation: Part of this note may be an electronic transcription/translation of spoken language to printed text using the Dragon Dictation System. Errors in dictation may reflect use of voice recognition software and not all errors in transcription may have been detected prior to signing.

## 2025-04-23 ENCOUNTER — OFFICE VISIT (OUTPATIENT)
Dept: PSYCHIATRY | Facility: CLINIC | Age: 50
End: 2025-04-23
Payer: COMMERCIAL

## 2025-04-23 VITALS
HEIGHT: 74 IN | WEIGHT: 225.6 LBS | HEART RATE: 60 BPM | SYSTOLIC BLOOD PRESSURE: 138 MMHG | BODY MASS INDEX: 28.95 KG/M2 | DIASTOLIC BLOOD PRESSURE: 90 MMHG | OXYGEN SATURATION: 96 %

## 2025-04-23 DIAGNOSIS — F41.1 GAD (GENERALIZED ANXIETY DISORDER): Primary | ICD-10-CM

## 2025-04-23 DIAGNOSIS — F33.1 MODERATE EPISODE OF RECURRENT MAJOR DEPRESSIVE DISORDER: ICD-10-CM

## 2025-04-23 RX ORDER — HYDROXYZINE HYDROCHLORIDE 10 MG/1
10 TABLET, FILM COATED ORAL DAILY PRN
Qty: 5 TABLET | Refills: 0 | Status: SHIPPED | OUTPATIENT
Start: 2025-04-23

## 2025-04-23 RX ORDER — ESCITALOPRAM OXALATE 10 MG/1
10 TABLET ORAL DAILY
Qty: 30 TABLET | Refills: 0 | Status: SHIPPED | OUTPATIENT
Start: 2025-04-23 | End: 2025-05-23

## 2025-04-23 RX ORDER — DICYCLOMINE HCL 20 MG
20 TABLET ORAL DAILY
COMMUNITY
Start: 2025-04-17

## 2025-04-23 NOTE — PROGRESS NOTES
"  Subjective     Devan Alarcon is a 49 y.o. male who presents today for follow up    Chief Complaint:   anxiety and depression    History of Present Illness:    Today is a follow-up visit.    Medication compliance: patient is compliant with medications, denies SE.  The patient reports that the Lexapro has helped a lot. He reports when he down thought they do not linger as long.   He reports fleeting thought of wishing he were dead. He states in the last two weeks they have occurred five times. He denies having suicidal thoughts, plan or intent. He feels much better since he has been taking the Lexapro.     Details:  Depression is rated at 2/10, with 10 being the worst. PHQ-9 score: 6 (prior 10)   Patient several days of having a depressed mood and nearly every day having anhedonia. He reports he has been doing things but he feels little pleasure when doing them. He had a resection of his colon on 3/10/25 and is going back to work this week. He denies feeling hopeless, helpless, worthless, or powerless. He reports having thought wishing he were dead  since he was 18-20 years old. These symptoms cause impairment in important areas of functioning but have improved with medication.      Anxiety is rated at 4/10, with 10 being the worst. MARIA FERNANDA-7 score: 4 (prior 15)  He reports symptoms of excessive excessive anxiety, excessive worry, and difficulty controlling worry have improved. He has worried about not being able to do the things he use to do for people, financial situation, relationship and everyday life.- work, being productive, quality of work. He reports feeling less frequently overwhelmed with life, and a decrease in \"what if\" thoughts and were brief. The symptoms cause impairment in important areas of functioning has improved with medication.      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        Sleep is good. He has been sleeping 8 hours per night. NIghtmares:  Denies     Appetite is good. He reports eating regular meals and eating small amounts frequently since the surgery. He reports drinking coffee in the morning several times a week.     Patient denies SI/HI, A/V hallucinations, or delusions.    Alcohol use: no  Drug use: no  Marijuana use: no  Tobacco use:  started smoking at age 12, started smoking a pipe for the last 10-12 years. He smokes more at work, and 1-2 when at home    Chronic health issues, no acute physical or medical issues today.    The following portions of the patient's history were reviewed and updated as appropriate: allergies, current medications, past family history, past medical history, past social history, past surgical history and problem list.    Previous psychiatric medications include:   Antidepressants: 10/2024 mirtazapine- knocked him out and miss work. Elavil: for headaches and mood. 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 He states that he has lived with anxiety and depression his whole life. He reports seeking help now because he feels the symptoms are getting worse and he does not want to struggle with these feeling by himself.   Family psych history: father had a mood disorder     Past Medical History:  Past Medical History:   Diagnosis Date    Anxiety     Arm fracture     left arm     Arthritis     knee and hands    Depression     Diverticulitis     Esophageal cancer     Oct 2018, esophagectomy    GERD (gastroesophageal reflux disease)     History of migraine headaches     Hyperlipidemia     Hyperlipidemia 01/20/2017    RLS (restless legs syndrome)        Social History:  Social History     Socioeconomic History     Marital status: Significant Other     Spouse name: Autumn Vázquez    Number of children: 0    Highest education level: High school graduate   Tobacco Use    Smoking status: Every Day     Current packs/day: 1.00     Average packs/day: 1 pack/day for 26.0 years (26.0 ttl pk-yrs)     Types: Pipe, Cigarettes     Start date: 10/1/2015    Smokeless tobacco: Never    Tobacco comments:     Quit cigarettes ~2018   Vaping Use    Vaping status: Never Used   Substance and Sexual Activity    Alcohol use: No    Drug use: No    Sexual activity: Defer     Partners: Female       Family History:  Family History   Problem Relation Age of Onset    Osteoporosis Mother     Anxiety disorder Father     Depression Father     Hypertension Father     Drug abuse Brother     Depression Brother     Anxiety disorder Brother        Past Surgical History:  Past Surgical History:   Procedure Laterality Date    COLON RESECTION N/A 3/11/2025    Procedure: COLON RESECTION LOW ANTERIOR LAPAROSCOPIC, TRANSANAL EXTRACTION OF SPECIMEN WITH DAVINCI ROBOT;  Surgeon: Cora Tan MD;  Location:  [facility];  Service: Robotics - DaVinci;  Laterality: N/A;    COLONOSCOPY      COLONOSCOPY N/A 11/10/2021    Procedure: COLONOSCOPY;  Surgeon: Joi Bains MD;  Location:  [facility];  Service: Gastroenterology;  Laterality: N/A;    COLONOSCOPY N/A 12/19/2024    Procedure: COLONOSCOPY;  Surgeon: Joi Bains MD;  Location:  [facility];  Service: Gastroenterology;  Laterality: N/A;    CYSTOSCOPY Bilateral 3/11/2025    Procedure: CYSTOSCOPY TEMPORARY PLACEMENT BILATERAL URETERAL CATHETER;  Surgeon: Jhony Garcia MD;  Location:  [facility];  Service: Urology;  Laterality: Bilateral;    ENDOSCOPY N/A 9/20/2018    Procedure: ESOPHAGOGASTRODUODENOSCOPY WITH BIOPSY CPT CODE: 62442;  Surgeon: Baron Mccall MD;  Location:  [facility];  Service: Gastroenterology    ENDOSCOPY N/A 10/17/2018    Procedure: ESOPHAGOGASTRODUODENOSCOPY WITH BIOPSY CPT  CODE: 86906;  Surgeon: Baron Mccall MD;  Location: [facility] OR;  Service: Gastroenterology    ENDOSCOPY      with ablation    ESOPHAGECTOMY      MOUTH SURGERY      UPPER GASTROINTESTINAL ENDOSCOPY      VENTRAL/INCISIONAL HERNIA REPAIR N/A 7/12/2019    Procedure: VENTRAL/INCISIONAL HERNIA REPAIR LAPAROSCOPIC;  Surgeon: Jaswinder Goodrich MD;  Location: [facility] OR;  Service: General       Problem List:  Patient Active Problem List   Diagnosis    Chronic headache    Hyperlipidemia    Gastroesophageal reflux disease    History of esophagectomy    Paroxysmal atrial fibrillation    Restless leg syndrome    Personal history of esophageal cancer    Status post laparoscopic hernia repair    Diverticulosis of large intestine    Diverticulitis    Pain in both lower extremities    PVD (peripheral vascular disease)    Blood in stool    Iron deficiency anemia    Encounter for screening for malignant neoplasm of colon    Personal history of colon polyps, unspecified    Diverticulosis large intestine w/o perforation or abscess w/o bleeding    Sigmoid diverticulitis, recurrent    S/P colectomy (Robotic LAR, I and D of pelvic abscess, left ureterolysis)    Acute postoperative pain    Leukocytosis, likely reactive    Anxiety and depression       Allergy:   No Known Allergies     Current Medications:   Current Outpatient Medications   Medication Sig Dispense Refill    cyclobenzaprine (FLEXERIL) 5 MG tablet Take 1 tablet by mouth 3 (Three) Times a Day As Needed for Muscle Spasms. 90 tablet 5    dicyclomine (BENTYL) 20 MG tablet Take 1 tablet by mouth Daily.      escitalopram (Lexapro) 10 MG tablet Take 1 tablet by mouth Daily. 30 tablet 1    ferrous gluconate (FERGON) 324 MG tablet Take 1 tablet by mouth Daily With Breakfast. 30 tablet 5    gabapentin (NEURONTIN) 100 MG capsule Take 1 capsule by mouth At Night As Needed (restless leg syndrome). 30 capsule 2     No current facility-administered medications for this visit.  "      Review of Symptoms:    Review of Systems   Constitutional: Negative.    HENT: Negative.     Eyes: Negative.    Respiratory: Negative.  Negative for shortness of breath.    Cardiovascular: Negative.  Negative for chest pain and palpitations.   Gastrointestinal: Negative.  Negative for nausea.   Endocrine: Negative.    Musculoskeletal: Negative.    Skin: Negative.    Allergic/Immunologic: Negative.    Neurological: Negative.  Negative for dizziness and confusion.   Hematological: Negative.    Psychiatric/Behavioral:  Positive for dysphoric mood, depressed mood and stress. The patient is nervous/anxious.        Objective     Physical Exam:   Physical Exam  Constitutional:       Appearance: Normal appearance.   Eyes:      Pupils: Pupils are equal, round, and reactive to light.   Cardiovascular:      Rate and Rhythm: Normal rate.   Pulmonary:      Effort: Pulmonary effort is normal.   Musculoskeletal:         General: Normal range of motion.      Cervical back: Normal range of motion.   Skin:     General: Skin is warm and dry.   Neurological:      General: No focal deficit present.      Mental Status: He is alert and oriented to person, place, and time.   Psychiatric:         Attention and Perception: Attention and perception normal.         Mood and Affect: Affect normal. Mood is anxious and depressed.         Speech: Speech normal.         Behavior: Behavior normal. Behavior is cooperative.         Thought Content: Thought content normal.         Cognition and Memory: Cognition and memory normal.         Judgment: Judgment normal.         Vitals:  Blood pressure 138/90, pulse 60, height 188 cm (74.02\"), weight 102 kg (225 lb 9.6 oz), SpO2 96%.   Body mass index is 28.95 kg/m².  Wt Readings from Last 3 Encounters:   04/23/25 102 kg (225 lb 9.6 oz)   03/26/25 98.9 kg (218 lb)   03/20/25 97.1 kg (214 lb)       Last 3 Blood Pressure and Pulse Readings:  BP Readings from Last 3 Encounters:   04/23/25 138/90   03/26/25 " 160/100   03/20/25 112/74     Pulse Readings from Last 3 Encounters:   04/23/25 60   03/26/25 81   03/20/25 75       PHQ-9 Score: April 23, 2025  Little interest or pleasure in doing things? Nearly every day   Feeling down, depressed, or hopeless? Several days   PHQ-2 Total Score 4   Trouble falling or staying asleep, or sleeping too much? Several days   Feeling tired or having little energy? Not at all   Poor appetite or overeating? Not at all   Feeling bad about yourself - or that you are a failure or have let yourself or your family down? Not at all   Trouble concentrating on things, such as reading the newspaper or watching television? Not at all   Moving or speaking so slowly that other people could have noticed? Or the opposite - being so fidgety or restless that you have been moving around a lot more than usual? Not at all     Thoughts that you would be better off dead, or of hurting yourself in some way? Several days   PHQ-9 Total Score 6   If you checked off any problems, how difficult have these problems made it for you to do your work, take care of things at home, or get along with other people? Somewhat difficult          MARIA FERNANDA-7 Score: April 23, 2025  Feeling nervous, anxious or on edge: Several days  Not being able to stop or control worrying: Several days  Worrying too much about different things: Not at all  Trouble Relaxing: Several days  Being so restless that it is hard to sit still: Not at all  Feeling afraid as if something awful might happen: Several days  Becoming easily annoyed or irritable: Not at all  MARIA FERNANDA 7 Total Score: 4  If you checked any problems, how difficult have these problems made it for you to do your work, take care of things at home, or get along with other people: Somewhat difficult     Appearance:  Patient is a 49-year-old  male.  He is casually dressed in jeans, hooded sweatshirt, and tennis shoes. His brown hair is short and covered by a ball cap.  His mustache is  neatly trimmed. He is appropriately dressed for his age and the weather.   Gait, Station, Strength: WNL    Mental Status Exam:   Hygiene:   good  Cooperation:  Cooperative  Eye Contact:  Good  Psychomotor Behavior:  Appropriate  Affect: Appropriate   Mood: depressed and anxious  Hopelessness: Denies  Speech:  Normal  Thought Process:  Goal directed  Thought Content:  Mood congruent  Suicidal:  None  Homicidal:  None  Hallucinations:  None  Delusion:  None  Memory:  Intact  Orientation:  Person, Place, Time, and Situation  Reliability:  good  Insight:  Fair  Judgement:  Good  Impulse Control:  Good  Physical/Medical Issues:  Yes , see chart        Lab Results:   Office Visit on 03/26/2025   Component Date Value Ref Range Status    THC, Screen, Urine 03/26/2025 Negative  Negative Final    Phencyclidine (PCP), Urine 03/26/2025 Negative  Negative Final    Cocaine Screen, Urine 03/26/2025 Negative  Negative Final    Methamphetamine, Ur 03/26/2025 Negative  Negative Final    Opiate Screen 03/26/2025 Positive (A)  Negative Final    Amphetamine Screen, Urine 03/26/2025 Negative  Negative Final    Benzodiazepine Screen, Urine 03/26/2025 Positive (A)  Negative Final    Tricyclic Antidepressants Screen 03/26/2025 Positive (A)  Negative Final    Methadone Screen, Urine 03/26/2025 Negative  Negative Final    Barbiturates Screen, Urine 03/26/2025 Negative  Negative Final    Oxycodone Screen, Urine 03/26/2025 Negative  Negative Final    Buprenorphine, Screen, Urine 03/26/2025 Negative  Negative Final    Fentanyl, Urine 03/26/2025 Negative  Negative Final   Admission on 03/11/2025, Discharged on 03/13/2025   Component Date Value Ref Range Status    Potassium 03/11/2025 4.0  3.5 - 5.2 mmol/L Final    Slight hemolysis detected by analyzer. Result may be falsely elevated.    ABO Type 03/11/2025 O   Final    RH type 03/11/2025 Positive   Final    Antibody Screen 03/11/2025 Negative   Final    T&S Expiration Date 03/11/2025 3/14/2025  "11:59:59 PM   Final    Case Report 03/11/2025    Final                    Value:Surgical Pathology Report                         Case: [id number]                                  Authorizing Provider:  Cora Tan MD        Collected:           03/11/2025 12:56 PM          Ordering Location:     [facility]   Received:            03/11/2025 02:00 PM                                 OR                                                                           Pathologist:           Rachelle Ibarra MD                                                          Specimen:    Large Intestine, Sigmoid Colon, Sigmoid and Upper Rectum                                   Clinical Information 03/11/2025    Final                    Value:Diverticulitis      Final Diagnosis 03/11/2025    Final                    Value:Sigmoid colon, segmental resection:  Diverticulosis coli with associated intramural abscess formation  Examined margins of resection (anastomotic rings) are viable  No identified dysplasia or malignancy      Gross Description 03/11/2025    Final                    Value:1. Large Intestine, Sigmoid Colon.  Received in formalin labeled \"sigmoid and upper rectum\" is a 14 x 14 x 5 cm aggregate of markedly disrupted fragments of colon, which are undesignated and unoriented.  Stapled margins are not identified.  The identifiable serosa is tan-gray and has dense adhesions and hemorrhage.  2 possible anastomotic rings are identified averaging 2 x 2 x 1 cm.  The colon fragments have tan, glistening mucosa with a normal folding pattern.  A single possible polyp is identified, 0.2 cm in diameter.  Sectioning of the fragments reveals multiple perforated and nonperforated diverticula.  The pericolonic fat is focally indurated and the bowel wall is diffusely thickened up to 1.0 cm.  Representative sections are submitted as follows:  1A-possible polyp, submitted entirely  3D-[id number] with adhesions and " indurated fat  1E-anastomotic tissue.  LDP        Microscopic Description 03/11/2025    Final                    Value:The slides are reviewed and demonstrate histopathologic features supporting the above rendered diagnosis.        Hemoglobin 03/11/2025 15.2  13.0 - 17.7 g/dL Final    Hematocrit 03/11/2025 46.7  37.5 - 51.0 % Final    Glucose 03/11/2025 181 (H)  70 - 130 mg/dL Final    Glucose 03/11/2025 223 (H)  70 - 130 mg/dL Final    Glucose 03/12/2025 139 (H)  65 - 99 mg/dL Final    BUN 03/12/2025 11  6 - 20 mg/dL Final    Creatinine 03/12/2025 0.84  0.76 - 1.27 mg/dL Final    Sodium 03/12/2025 137  136 - 145 mmol/L Final    Potassium 03/12/2025 4.7  3.5 - 5.2 mmol/L Final    Chloride 03/12/2025 102  98 - 107 mmol/L Final    CO2 03/12/2025 23.0  22.0 - 29.0 mmol/L Final    Calcium 03/12/2025 8.4 (L)  8.6 - 10.5 mg/dL Final    BUN/Creatinine Ratio 03/12/2025 13.1  7.0 - 25.0 Final    Anion Gap 03/12/2025 12.0  5.0 - 15.0 mmol/L Final    eGFR 03/12/2025 106.2  >60.0 mL/min/1.73 Final    Magnesium 03/12/2025 1.9  1.6 - 2.6 mg/dL Final    WBC 03/12/2025 21.36 (H)  3.40 - 10.80 10*3/mm3 Final    RBC 03/12/2025 5.04  4.14 - 5.80 10*6/mm3 Final    Hemoglobin 03/12/2025 15.3  13.0 - 17.7 g/dL Final    Hematocrit 03/12/2025 45.8  37.5 - 51.0 % Final    MCV 03/12/2025 90.9  79.0 - 97.0 fL Final    MCH 03/12/2025 30.4  26.6 - 33.0 pg Final    MCHC 03/12/2025 33.4  31.5 - 35.7 g/dL Final    RDW 03/12/2025 14.2  12.3 - 15.4 % Final    RDW-SD 03/12/2025 47.4  37.0 - 54.0 fl Final    MPV 03/12/2025 10.4  6.0 - 12.0 fL Final    Platelets 03/12/2025 284  140 - 450 10*3/mm3 Final    Neutrophil % 03/12/2025 89.2 (H)  42.7 - 76.0 % Final    Lymphocyte % 03/12/2025 4.9 (L)  19.6 - 45.3 % Final    Monocyte % 03/12/2025 5.3  5.0 - 12.0 % Final    Eosinophil % 03/12/2025 0.0 (L)  0.3 - 6.2 % Final    Basophil % 03/12/2025 0.1  0.0 - 1.5 % Final    Immature Grans % 03/12/2025 0.5  0.0 - 0.5 % Final    Neutrophils, Absolute 03/12/2025  19.05 (H)  1.70 - 7.00 10*3/mm3 Final    Lymphocytes, Absolute 03/12/2025 1.05  0.70 - 3.10 10*3/mm3 Final    Monocytes, Absolute 03/12/2025 1.14 (H)  0.10 - 0.90 10*3/mm3 Final    Eosinophils, Absolute 03/12/2025 0.00  0.00 - 0.40 10*3/mm3 Final    Basophils, Absolute 03/12/2025 0.02  0.00 - 0.20 10*3/mm3 Final    Immature Grans, Absolute 03/12/2025 0.10 (H)  0.00 - 0.05 10*3/mm3 Final    nRBC 03/12/2025 0.0  0.0 - 0.2 /100 WBC Final    Glucose 03/11/2025 158 (H)  70 - 130 mg/dL Final    Glucose 03/12/2025 136 (H)  70 - 130 mg/dL Final    Glucose 03/12/2025 120  70 - 130 mg/dL Final    Glucose 03/12/2025 134 (H)  70 - 130 mg/dL Final    WBC 03/13/2025 15.20 (H)  3.40 - 10.80 10*3/mm3 Final    RBC 03/13/2025 4.20  4.14 - 5.80 10*6/mm3 Final    Hemoglobin 03/13/2025 12.4 (L)  13.0 - 17.7 g/dL Final    Hematocrit 03/13/2025 39.2  37.5 - 51.0 % Final    MCV 03/13/2025 93.3  79.0 - 97.0 fL Final    MCH 03/13/2025 29.5  26.6 - 33.0 pg Final    MCHC 03/13/2025 31.6  31.5 - 35.7 g/dL Final    RDW 03/13/2025 14.6  12.3 - 15.4 % Final    RDW-SD 03/13/2025 50.4  37.0 - 54.0 fl Final    MPV 03/13/2025 10.1  6.0 - 12.0 fL Final    Platelets 03/13/2025 243  140 - 450 10*3/mm3 Final    Neutrophil % 03/13/2025 76.4 (H)  42.7 - 76.0 % Final    Lymphocyte % 03/13/2025 15.8 (L)  19.6 - 45.3 % Final    Monocyte % 03/13/2025 6.7  5.0 - 12.0 % Final    Eosinophil % 03/13/2025 0.4  0.3 - 6.2 % Final    Basophil % 03/13/2025 0.3  0.0 - 1.5 % Final    Immature Grans % 03/13/2025 0.4  0.0 - 0.5 % Final    Neutrophils, Absolute 03/13/2025 11.62 (H)  1.70 - 7.00 10*3/mm3 Final    Lymphocytes, Absolute 03/13/2025 2.40  0.70 - 3.10 10*3/mm3 Final    Monocytes, Absolute 03/13/2025 1.02 (H)  0.10 - 0.90 10*3/mm3 Final    Eosinophils, Absolute 03/13/2025 0.06  0.00 - 0.40 10*3/mm3 Final    Basophils, Absolute 03/13/2025 0.04  0.00 - 0.20 10*3/mm3 Final    Immature Grans, Absolute 03/13/2025 0.06 (H)  0.00 - 0.05 10*3/mm3 Final    nRBC 03/13/2025  0.0  0.0 - 0.2 /100 WBC Final    Glucose 03/12/2025 118  70 - 130 mg/dL Final    Glucose 03/13/2025 95  70 - 130 mg/dL Final    Glucose 03/13/2025 97  70 - 130 mg/dL Final   Pre-Admission Testing on 03/04/2025   Component Date Value Ref Range Status    QT Interval 03/04/2025 358  ms Final    QTC Interval 03/04/2025 389  ms Final    Hemoglobin A1C 03/04/2025 5.40  4.80 - 5.60 % Final    WBC 03/04/2025 8.27  3.40 - 10.80 10*3/mm3 Final    RBC 03/04/2025 5.57  4.14 - 5.80 10*6/mm3 Final    Hemoglobin 03/04/2025 16.1  13.0 - 17.7 g/dL Final    Hematocrit 03/04/2025 50.8  37.5 - 51.0 % Final    MCV 03/04/2025 91.2  79.0 - 97.0 fL Final    MCH 03/04/2025 28.9  26.6 - 33.0 pg Final    MCHC 03/04/2025 31.7  31.5 - 35.7 g/dL Final    RDW 03/04/2025 13.9  12.3 - 15.4 % Final    RDW-SD 03/04/2025 46.5  37.0 - 54.0 fl Final    MPV 03/04/2025 10.1  6.0 - 12.0 fL Final    Platelets 03/04/2025 278  140 - 450 10*3/mm3 Final    Glucose 03/04/2025 95  65 - 99 mg/dL Final    BUN 03/04/2025 11  6 - 20 mg/dL Final    Creatinine 03/04/2025 0.95  0.76 - 1.27 mg/dL Final    Sodium 03/04/2025 142  136 - 145 mmol/L Final    Potassium 03/04/2025 5.4 (H)  3.5 - 5.2 mmol/L Final    Slight hemolysis detected by analyzer. Result may be falsely elevated.    Chloride 03/04/2025 102  98 - 107 mmol/L Final    CO2 03/04/2025 28.0  22.0 - 29.0 mmol/L Final    Calcium 03/04/2025 9.7  8.6 - 10.5 mg/dL Final    Total Protein 03/04/2025 7.4  6.0 - 8.5 g/dL Final    Albumin 03/04/2025 4.2  3.5 - 5.2 g/dL Final    ALT (SGPT) 03/04/2025 25  1 - 41 U/L Final    AST (SGOT) 03/04/2025 22  1 - 40 U/L Final    Alkaline Phosphatase 03/04/2025 89  39 - 117 U/L Final    Total Bilirubin 03/04/2025 0.5  0.0 - 1.2 mg/dL Final    Globulin 03/04/2025 3.2  gm/dL Final    Calculated Result    A/G Ratio 03/04/2025 1.3  g/dL Final    BUN/Creatinine Ratio 03/04/2025 11.6  7.0 - 25.0 Final    Anion Gap 03/04/2025 12.0  5.0 - 15.0 mmol/L Final    eGFR 03/04/2025 97.5  >60.0  mL/min/1.73 Final    CEA 03/04/2025 3.61  ng/mL Final    ABO Type 03/04/2025 O   Final    RH type 03/04/2025 Positive   Final     Assessment & Plan   Diagnoses and all orders for this visit:    1. MARIA FERNANDA (generalized anxiety disorder) (Primary)    2. Moderate episode of recurrent major depressive disorder      Visit Diagnoses:    ICD-10-CM ICD-9-CM   1. MARIA FERNANDA (generalized anxiety disorder)  F41.1 300.02   2. Moderate episode of recurrent major depressive disorder  F33.1 296.32       GOALS:  Short Term Goals: Patient will be compliant with medication, and patient will have no significant medication related side effects.  Patient will be engaged in psychotherapy as indicated.  Patient will report subjective improvement of symptoms.  Long term goals: To stabilize mood and treat/improve subjective symptoms, the patient will stay out of the hospital, the patient will be at an optimal level of functioning, and the patient will take all medications as prescribed.  The patient/guardian verbalized understanding and agreement with goals that were mutually set.      TREATMENT PLAN:   -Continue escitalopram (Lexapro) 10 mg p.o nighty for anxiety and depression.   -Start hydroxyzine HCL (Atarax) 10 mg po daily as needed for anxiety.   -Discussed supportive psychotherapy efforts to develop coping skills to manage stress and emotions. Patient declines at this time.   -Pharmacological and Non-Pharmacological treatment options discussed during today's visit.   -The benefits of a healthy diet and exercise were discussed with patient, especially the positive effects they have on mental health. Patient encouraged to consider lifestyle modification regarding  diet and exercise patterns to maximize results of mental health treatment.   -We discussed sleep hygiene including going to bed at the same time and getting up at the same time every day, decreased caffeine consumption, going to bed early enough to get 7 or 8 hours in bed, reading and  relaxing before bedtime, and avoiding stimulating activities close to bedtime.   -Patient acknowledged and verbally consented with current treatment plan and was educated on the importance of compliance with treatment and follow-up appointments.    -Return to clinic in 4 weeks for follow up.  -Reviewed previous available documentation  -Reviewed most recent available labs  -DEONTE reviewed     MEDICATION ISSUES:  -Discussed medication options and treatment plan of prescribed medication as well as the risks, benefits, any black box warnings, and side effects.   -I have explained to the patient drugs of the SSRI class can have side effects such as weight gain, sexual dysfunction, insomnia, headache, nausea. I advised the patient of the black box warning for all antidepressants is the increased risk of suicidal thoughts. In addition, he should monitor for signs of serotonin syndrome: shivering, vital sign instability, elevated temperature and hyperreflexia.    -Patient is agreeable to call the office with any worsening of symptoms or onset of side effects, or if any concerns or questions arise.    -The contact information for the office is made available to the patient. Patient is agreeable to call 911 or go to the nearest ER should they begin having any SI/HI, or if any urgent concerns arise. No medication side effects or related complaints today.      MEDS ORDERED DURING VISIT:  No orders of the defined types were placed in this encounter.      MEDS DISCONTINUED DURING VISIT:   Medications Discontinued During This Encounter   Medication Reason    albuterol sulfate  (90 Base) MCG/ACT inhaler Patient Reported Not Taking    fexofenadine (Allegra Allergy) 180 MG tablet Patient Reported Not Taking    HYDROcodone-acetaminophen (NORCO) 7.5-325 MG per tablet Patient Reported Not Taking        Follow Up Appointment:   Return in about 4 weeks (around 5/21/2025) for Recheck.           This document has been  electronically signed by Tamiko Leblanc, APRN  April 23, 2025 09:30 EDT    Dictated Utilizing Dragon Dictation: Part of this note may be an electronic transcription/translation of spoken language to printed text using the Dragon Dictation System. Errors in dictation may reflect use of voice recognition software and not all errors in transcription may have been detected prior to signing.

## 2025-05-21 ENCOUNTER — OFFICE VISIT (OUTPATIENT)
Dept: PSYCHIATRY | Facility: CLINIC | Age: 50
End: 2025-05-21
Payer: COMMERCIAL

## 2025-05-21 VITALS
BODY MASS INDEX: 28.88 KG/M2 | SYSTOLIC BLOOD PRESSURE: 128 MMHG | WEIGHT: 225 LBS | DIASTOLIC BLOOD PRESSURE: 80 MMHG | OXYGEN SATURATION: 99 % | HEART RATE: 83 BPM | HEIGHT: 74 IN

## 2025-05-21 DIAGNOSIS — F41.1 GAD (GENERALIZED ANXIETY DISORDER): Primary | ICD-10-CM

## 2025-05-21 DIAGNOSIS — F33.1 MODERATE EPISODE OF RECURRENT MAJOR DEPRESSIVE DISORDER: ICD-10-CM

## 2025-05-21 RX ORDER — ESCITALOPRAM OXALATE 10 MG/1
10 TABLET ORAL DAILY
Qty: 30 TABLET | Refills: 0 | Status: SHIPPED | OUTPATIENT
Start: 2025-05-21 | End: 2025-06-20

## 2025-05-21 RX ORDER — BUSPIRONE HYDROCHLORIDE 5 MG/1
5 TABLET ORAL 2 TIMES DAILY
Qty: 60 TABLET | Refills: 0 | Status: SHIPPED | OUTPATIENT
Start: 2025-05-21 | End: 2025-06-20

## 2025-05-21 NOTE — PROGRESS NOTES
"  Subjective     Devan Alarcon is a 49 y.o. male who presents today for follow up    Chief Complaint:   anxiety and depression    History of Present Illness:    Today is a follow-up visit.    Medication compliance: patient is compliant with medications, denies SE.  The patient reports that the Lexapro has helped a lot.      Patient had a resection of his colon on 3/10/25 and went back to work at the end of April. He reports he is doing well at work.     He reports having thoughts wishing he were dead since he was 18-20 years old. Recently he reports fleeting thought of wishing he were dead. wo weeks they have occurred five times. He denies having suicidal thoughts, plan or intent. He feels much better since he has been taking the Lexapro.     Details:  Depression is rated at 5/10, with 10 being the worst. PHQ-9 score: 9 (prior 6)   Patient several days of having a depressed mood and nearly every day having anhedonia. He denies feeling hopeless, helpless, worthless, or powerless. He reports feeling tired and sleeping too much. These symptoms cause impairment in important areas of functioning but have improved with medication.      Anxiety is rated at 7/10, with 10 being the worst. MARIA FERNANDA-7 score: 7 (prior 4)  He reports overall symptoms of excessive excessive anxiety, excessive worry, and difficulty controlling worry have improved. He reports more anxiety around a big group of people can increase anxiety. He reports having deadlines at home and at work cause increase stress and anxiety. He reports recently buying a new car did not bring him navin. y of work. He reports feeling less frequently overwhelmed with life, and a decrease in \"what if\" thoughts. The symptoms cause impairment in important areas of functioning has improved with medication.      Patient reports having anxiety attacks. He reports having 4 anxiety attacks in the last month- he reports having deadlines and spreadsheets at work. He reports increased " heart rate, shaky, he reports feeling of procrastination.      Sleep is good. He has been sleeping 8 hours per night. He reports falling asleep easily and could fall back to sleep if he laid there. Nightmares: Denies     Appetite is good. He reports eating regular meals and eating small amounts frequently since the surgery. He reports drinking coffee in the morning several times a week.     Patient denies SI/HI, A/V hallucinations, or delusions.    Alcohol use: no  Drug use: no  Marijuana use: no  Tobacco use:  started smoking at age 12, started smoking a pipe for the last 10-12 years. He smokes more at work, and 1-2 when at home    Chronic health issues, no acute physical or medical issues today.    The following portions of the patient's history were reviewed and updated as appropriate: allergies, current medications, past family history, past medical history, past social history, past surgical history and problem list.    Previous psychiatric medications include:   Antidepressants: 10/2024 mirtazapine- knocked him out and miss work. Elavil: for headaches and mood. 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 He states that he has lived with anxiety and depression his whole life. He reports seeking help now because he feels the symptoms are getting worse and he does not want to struggle with these feeling by himself.   Family psych history: father had a mood disorder     Past Medical History:  Past Medical History:   Diagnosis Date    Anxiety     Arm fracture     left arm     Arthritis     knee and hands    Depression     Diverticulitis     Esophageal cancer     Oct 2018, esophagectomy    GERD (gastroesophageal reflux disease)     History of migraine headaches      Hyperlipidemia     Hyperlipidemia 01/20/2017    RLS (restless legs syndrome)        Social History:  Social History     Socioeconomic History    Marital status: Significant Other     Spouse name: Autumn Vázquez    Number of children: 0    Highest education level: High school graduate   Tobacco Use    Smoking status: Every Day     Current packs/day: 1.00     Average packs/day: 1 pack/day for 26.0 years (26.0 ttl pk-yrs)     Types: Pipe, Cigarettes     Start date: 10/1/2015    Smokeless tobacco: Never    Tobacco comments:     Quit cigarettes ~2018   Vaping Use    Vaping status: Never Used   Substance and Sexual Activity    Alcohol use: No    Drug use: No    Sexual activity: Defer     Partners: Female       Family History:  Family History   Problem Relation Age of Onset    Osteoporosis Mother     Anxiety disorder Father     Depression Father     Hypertension Father     Drug abuse Brother     Depression Brother     Anxiety disorder Brother        Past Surgical History:  Past Surgical History:   Procedure Laterality Date    COLON RESECTION N/A 3/11/2025    Procedure: COLON RESECTION LOW ANTERIOR LAPAROSCOPIC, TRANSANAL EXTRACTION OF SPECIMEN WITH TC Ice CreamINCI ROBOT;  Surgeon: Cora Tan MD;  Location:  [facility];  Service: Robotics - DaVinci;  Laterality: N/A;    COLONOSCOPY      COLONOSCOPY N/A 11/10/2021    Procedure: COLONOSCOPY;  Surgeon: Joi Bains MD;  Location:  [facility];  Service: Gastroenterology;  Laterality: N/A;    COLONOSCOPY N/A 12/19/2024    Procedure: COLONOSCOPY;  Surgeon: Joi Bains MD;  Location:  [facility];  Service: Gastroenterology;  Laterality: N/A;    CYSTOSCOPY Bilateral 3/11/2025    Procedure: CYSTOSCOPY TEMPORARY PLACEMENT BILATERAL URETERAL CATHETER;  Surgeon: Jhony Garcia MD;  Location:  [facility];  Service: Urology;  Laterality: Bilateral;    ENDOSCOPY N/A 9/20/2018    Procedure: ESOPHAGOGASTRODUODENOSCOPY WITH BIOPSY CPT CODE: 83795;  Surgeon: Baron Mccall  MD Manoj;  Location: [facility] OR;  Service: Gastroenterology    ENDOSCOPY N/A 10/17/2018    Procedure: ESOPHAGOGASTRODUODENOSCOPY WITH BIOPSY CPT CODE: 47592;  Surgeon: Baron Mccall MD;  Location: [facility] OR;  Service: Gastroenterology    ENDOSCOPY      with ablation    ESOPHAGECTOMY      MOUTH SURGERY      UPPER GASTROINTESTINAL ENDOSCOPY      VENTRAL/INCISIONAL HERNIA REPAIR N/A 7/12/2019    Procedure: VENTRAL/INCISIONAL HERNIA REPAIR LAPAROSCOPIC;  Surgeon: Jaswinder Goodrich MD;  Location: [facility] OR;  Service: General       Problem List:  Patient Active Problem List   Diagnosis    Chronic headache    Hyperlipidemia    Gastroesophageal reflux disease    History of esophagectomy    Paroxysmal atrial fibrillation    Restless leg syndrome    Personal history of esophageal cancer    Status post laparoscopic hernia repair    Diverticulosis of large intestine    Diverticulitis    Pain in both lower extremities    PVD (peripheral vascular disease)    Blood in stool    Iron deficiency anemia    Encounter for screening for malignant neoplasm of colon    Personal history of colon polyps, unspecified    Diverticulosis large intestine w/o perforation or abscess w/o bleeding    Sigmoid diverticulitis, recurrent    S/P colectomy (Robotic LAR, I and D of pelvic abscess, left ureterolysis)    Acute postoperative pain    Leukocytosis, likely reactive    Anxiety and depression       Allergy:   No Known Allergies     Current Medications:   Current Outpatient Medications   Medication Sig Dispense Refill    cyclobenzaprine (FLEXERIL) 5 MG tablet Take 1 tablet by mouth 3 (Three) Times a Day As Needed for Muscle Spasms. 90 tablet 5    dicyclomine (BENTYL) 20 MG tablet Take 1 tablet by mouth Daily.      escitalopram (Lexapro) 10 MG tablet Take 1 tablet by mouth Daily for 30 days. 30 tablet 0    ferrous gluconate (FERGON) 324 MG tablet Take 1 tablet by mouth Daily With Breakfast. 30 tablet 5    gabapentin (NEURONTIN) 100 MG  "capsule Take 1 capsule by mouth At Night As Needed (restless leg syndrome). 30 capsule 2    busPIRone (BUSPAR) 5 MG tablet Take 1 tablet by mouth 2 (Two) Times a Day for 30 days. 60 tablet 0     No current facility-administered medications for this visit.       Review of Symptoms:    Review of Systems   Constitutional:  Positive for fatigue.   HENT: Negative.     Eyes: Negative.    Respiratory: Negative.  Negative for shortness of breath.    Cardiovascular: Negative.  Negative for chest pain and palpitations.   Gastrointestinal: Negative.  Negative for nausea.   Endocrine: Negative.    Musculoskeletal: Negative.    Skin: Negative.    Allergic/Immunologic: Negative.    Neurological: Negative.  Negative for dizziness.   Hematological: Negative.    Psychiatric/Behavioral:  Positive for dysphoric mood, depressed mood and stress. The patient is nervous/anxious.        Objective     Physical Exam:   Physical Exam  Constitutional:       Appearance: Normal appearance.   Eyes:      Pupils: Pupils are equal, round, and reactive to light.   Cardiovascular:      Rate and Rhythm: Normal rate.   Pulmonary:      Effort: Pulmonary effort is normal.   Musculoskeletal:         General: Normal range of motion.      Cervical back: Normal range of motion.   Skin:     General: Skin is warm and dry.   Neurological:      General: No focal deficit present.      Mental Status: He is alert and oriented to person, place, and time.   Psychiatric:         Attention and Perception: Attention and perception normal.         Mood and Affect: Affect normal. Mood is anxious and depressed.         Speech: Speech normal.         Behavior: Behavior normal. Behavior is cooperative.         Thought Content: Thought content normal.         Cognition and Memory: Cognition and memory normal.         Judgment: Judgment normal.         Vitals:  Blood pressure 128/80, pulse 83, height 188 cm (74.02\"), weight 102 kg (225 lb), SpO2 99%.   Body mass index is 28.88 " kg/m².  Wt Readings from Last 3 Encounters:   05/21/25 102 kg (225 lb)   04/23/25 102 kg (225 lb 9.6 oz)   03/26/25 98.9 kg (218 lb)       Last 3 Blood Pressure and Pulse Readings:  BP Readings from Last 3 Encounters:   05/21/25 128/80   04/23/25 138/90   03/26/25 160/100     Pulse Readings from Last 3 Encounters:   05/21/25 83   04/23/25 60   03/26/25 81       PHQ-9 Score: May 21, 2025  Little interest or pleasure in doing things? Nearly every day   Feeling down, depressed, or hopeless? Several days   PHQ-2 Total Score 4   Trouble falling or staying asleep, or sleeping too much? More than half the days   Feeling tired or having little energy? Several days   Poor appetite or overeating? Not at all   Feeling bad about yourself - or that you are a failure or have let yourself or your family down? Several days   Trouble concentrating on things, such as reading the newspaper or watching television? Not at all   Moving or speaking so slowly that other people could have noticed? Or the opposite - being so fidgety or restless that you have been moving around a lot more than usual? Not at all     Thoughts that you would be better off dead, or of hurting yourself in some way? Several days   PHQ-9 Total Score 9   If you checked off any problems, how difficult have these problems made it for you to do your work, take care of things at home, or get along with other people? Somewhat difficult          MARIA FERNANDA-7 Score: May 21, 2025  Feeling nervous, anxious or on edge: More than half the days  Not being able to stop or control worrying: Several days  Worrying too much about different things: Several days  Trouble Relaxing: More than half the days  Being so restless that it is hard to sit still: Not at all  Feeling afraid as if something awful might happen: Not at all  Becoming easily annoyed or irritable: Several days  MARIA FERNANDA 7 Total Score: 7  If you checked any problems, how difficult have these problems made it for you to do your  work, take care of things at home, or get along with other people: Not difficult at all     Appearance:  Patient is a 49-year-old  male.  He is casually dressed in jeans, plaid flannel shirt, and tennis shoes. His brown hair is short and covered by a ball cap.  His mustache is neatly trimmed. He is appropriately dressed for his age and the weather.   Gait, Station, Strength: WNL    Mental Status Exam:   Hygiene:   good  Cooperation:  Cooperative  Eye Contact:  Good  Psychomotor Behavior:  Appropriate  Affect: Appropriate   Mood: depressed and anxious  Hopelessness: Denies  Speech:  Normal  Thought Process:  Goal directed  Thought Content:  Mood congruent  Suicidal:  None  Homicidal:  None  Hallucinations:  None  Delusion:  None  Memory:  Intact  Orientation:  Person, Place, Time, and Situation  Reliability:  good  Insight:  Fair  Judgement:  Good  Impulse Control:  Good  Physical/Medical Issues:  Yes , see chart        Lab Results:   Office Visit on 03/26/2025   Component Date Value Ref Range Status    THC, Screen, Urine 03/26/2025 Negative  Negative Final    Phencyclidine (PCP), Urine 03/26/2025 Negative  Negative Final    Cocaine Screen, Urine 03/26/2025 Negative  Negative Final    Methamphetamine, Ur 03/26/2025 Negative  Negative Final    Opiate Screen 03/26/2025 Positive (A)  Negative Final    Amphetamine Screen, Urine 03/26/2025 Negative  Negative Final    Benzodiazepine Screen, Urine 03/26/2025 Positive (A)  Negative Final    Tricyclic Antidepressants Screen 03/26/2025 Positive (A)  Negative Final    Methadone Screen, Urine 03/26/2025 Negative  Negative Final    Barbiturates Screen, Urine 03/26/2025 Negative  Negative Final    Oxycodone Screen, Urine 03/26/2025 Negative  Negative Final    Buprenorphine, Screen, Urine 03/26/2025 Negative  Negative Final    Fentanyl, Urine 03/26/2025 Negative  Negative Final   Admission on 03/11/2025, Discharged on 03/13/2025   Component Date Value Ref Range Status     "Potassium 03/11/2025 4.0  3.5 - 5.2 mmol/L Final    Slight hemolysis detected by analyzer. Result may be falsely elevated.    ABO Type 03/11/2025 O   Final    RH type 03/11/2025 Positive   Final    Antibody Screen 03/11/2025 Negative   Final    T&S Expiration Date 03/11/2025 3/14/2025 11:59:59 PM   Final    Case Report 03/11/2025    Final                    Value:Surgical Pathology Report                         Case: [id number]                                  Authorizing Provider:  Cora Tan MD        Collected:           03/11/2025 12:56 PM          Ordering Location:     [facility]   Received:            03/11/2025 02:00 PM                                 OR                                                                           Pathologist:           Rachelle Ibarra MD                                                          Specimen:    Large Intestine, Sigmoid Colon, Sigmoid and Upper Rectum                                   Clinical Information 03/11/2025    Final                    Value:Diverticulitis      Final Diagnosis 03/11/2025    Final                    Value:Sigmoid colon, segmental resection:  Diverticulosis coli with associated intramural abscess formation  Examined margins of resection (anastomotic rings) are viable  No identified dysplasia or malignancy      Gross Description 03/11/2025    Final                    Value:1. Large Intestine, Sigmoid Colon.  Received in formalin labeled \"sigmoid and upper rectum\" is a 14 x 14 x 5 cm aggregate of markedly disrupted fragments of colon, which are undesignated and unoriented.  Stapled margins are not identified.  The identifiable serosa is tan-gray and has dense adhesions and hemorrhage.  2 possible anastomotic rings are identified averaging 2 x 2 x 1 cm.  The colon fragments have tan, glistening mucosa with a normal folding pattern.  A single possible polyp is identified, 0.2 cm in diameter.  Sectioning of the fragments " reveals multiple perforated and nonperforated diverticula.  The pericolonic fat is focally indurated and the bowel wall is diffusely thickened up to 1.0 cm.  Representative sections are submitted as follows:  1A-possible polyp, submitted entirely  [id number] with adhesions and indurated fat  1E-anastomotic tissue.  LDP        Microscopic Description 03/11/2025    Final                    Value:The slides are reviewed and demonstrate histopathologic features supporting the above rendered diagnosis.        Hemoglobin 03/11/2025 15.2  13.0 - 17.7 g/dL Final    Hematocrit 03/11/2025 46.7  37.5 - 51.0 % Final    Glucose 03/11/2025 181 (H)  70 - 130 mg/dL Final    Glucose 03/11/2025 223 (H)  70 - 130 mg/dL Final    Glucose 03/12/2025 139 (H)  65 - 99 mg/dL Final    BUN 03/12/2025 11  6 - 20 mg/dL Final    Creatinine 03/12/2025 0.84  0.76 - 1.27 mg/dL Final    Sodium 03/12/2025 137  136 - 145 mmol/L Final    Potassium 03/12/2025 4.7  3.5 - 5.2 mmol/L Final    Chloride 03/12/2025 102  98 - 107 mmol/L Final    CO2 03/12/2025 23.0  22.0 - 29.0 mmol/L Final    Calcium 03/12/2025 8.4 (L)  8.6 - 10.5 mg/dL Final    BUN/Creatinine Ratio 03/12/2025 13.1  7.0 - 25.0 Final    Anion Gap 03/12/2025 12.0  5.0 - 15.0 mmol/L Final    eGFR 03/12/2025 106.2  >60.0 mL/min/1.73 Final    Magnesium 03/12/2025 1.9  1.6 - 2.6 mg/dL Final    WBC 03/12/2025 21.36 (H)  3.40 - 10.80 10*3/mm3 Final    RBC 03/12/2025 5.04  4.14 - 5.80 10*6/mm3 Final    Hemoglobin 03/12/2025 15.3  13.0 - 17.7 g/dL Final    Hematocrit 03/12/2025 45.8  37.5 - 51.0 % Final    MCV 03/12/2025 90.9  79.0 - 97.0 fL Final    MCH 03/12/2025 30.4  26.6 - 33.0 pg Final    MCHC 03/12/2025 33.4  31.5 - 35.7 g/dL Final    RDW 03/12/2025 14.2  12.3 - 15.4 % Final    RDW-SD 03/12/2025 47.4  37.0 - 54.0 fl Final    MPV 03/12/2025 10.4  6.0 - 12.0 fL Final    Platelets 03/12/2025 284  140 - 450 10*3/mm3 Final    Neutrophil % 03/12/2025 89.2 (H)  42.7 - 76.0 % Final    Lymphocyte  % 03/12/2025 4.9 (L)  19.6 - 45.3 % Final    Monocyte % 03/12/2025 5.3  5.0 - 12.0 % Final    Eosinophil % 03/12/2025 0.0 (L)  0.3 - 6.2 % Final    Basophil % 03/12/2025 0.1  0.0 - 1.5 % Final    Immature Grans % 03/12/2025 0.5  0.0 - 0.5 % Final    Neutrophils, Absolute 03/12/2025 19.05 (H)  1.70 - 7.00 10*3/mm3 Final    Lymphocytes, Absolute 03/12/2025 1.05  0.70 - 3.10 10*3/mm3 Final    Monocytes, Absolute 03/12/2025 1.14 (H)  0.10 - 0.90 10*3/mm3 Final    Eosinophils, Absolute 03/12/2025 0.00  0.00 - 0.40 10*3/mm3 Final    Basophils, Absolute 03/12/2025 0.02  0.00 - 0.20 10*3/mm3 Final    Immature Grans, Absolute 03/12/2025 0.10 (H)  0.00 - 0.05 10*3/mm3 Final    nRBC 03/12/2025 0.0  0.0 - 0.2 /100 WBC Final    Glucose 03/11/2025 158 (H)  70 - 130 mg/dL Final    Glucose 03/12/2025 136 (H)  70 - 130 mg/dL Final    Glucose 03/12/2025 120  70 - 130 mg/dL Final    Glucose 03/12/2025 134 (H)  70 - 130 mg/dL Final    WBC 03/13/2025 15.20 (H)  3.40 - 10.80 10*3/mm3 Final    RBC 03/13/2025 4.20  4.14 - 5.80 10*6/mm3 Final    Hemoglobin 03/13/2025 12.4 (L)  13.0 - 17.7 g/dL Final    Hematocrit 03/13/2025 39.2  37.5 - 51.0 % Final    MCV 03/13/2025 93.3  79.0 - 97.0 fL Final    MCH 03/13/2025 29.5  26.6 - 33.0 pg Final    MCHC 03/13/2025 31.6  31.5 - 35.7 g/dL Final    RDW 03/13/2025 14.6  12.3 - 15.4 % Final    RDW-SD 03/13/2025 50.4  37.0 - 54.0 fl Final    MPV 03/13/2025 10.1  6.0 - 12.0 fL Final    Platelets 03/13/2025 243  140 - 450 10*3/mm3 Final    Neutrophil % 03/13/2025 76.4 (H)  42.7 - 76.0 % Final    Lymphocyte % 03/13/2025 15.8 (L)  19.6 - 45.3 % Final    Monocyte % 03/13/2025 6.7  5.0 - 12.0 % Final    Eosinophil % 03/13/2025 0.4  0.3 - 6.2 % Final    Basophil % 03/13/2025 0.3  0.0 - 1.5 % Final    Immature Grans % 03/13/2025 0.4  0.0 - 0.5 % Final    Neutrophils, Absolute 03/13/2025 11.62 (H)  1.70 - 7.00 10*3/mm3 Final    Lymphocytes, Absolute 03/13/2025 2.40  0.70 - 3.10 10*3/mm3 Final    Monocytes,  Absolute 03/13/2025 1.02 (H)  0.10 - 0.90 10*3/mm3 Final    Eosinophils, Absolute 03/13/2025 0.06  0.00 - 0.40 10*3/mm3 Final    Basophils, Absolute 03/13/2025 0.04  0.00 - 0.20 10*3/mm3 Final    Immature Grans, Absolute 03/13/2025 0.06 (H)  0.00 - 0.05 10*3/mm3 Final    nRBC 03/13/2025 0.0  0.0 - 0.2 /100 WBC Final    Glucose 03/12/2025 118  70 - 130 mg/dL Final    Glucose 03/13/2025 95  70 - 130 mg/dL Final    Glucose 03/13/2025 97  70 - 130 mg/dL Final   Pre-Admission Testing on 03/04/2025   Component Date Value Ref Range Status    QT Interval 03/04/2025 358  ms Final    QTC Interval 03/04/2025 389  ms Final    Hemoglobin A1C 03/04/2025 5.40  4.80 - 5.60 % Final    WBC 03/04/2025 8.27  3.40 - 10.80 10*3/mm3 Final    RBC 03/04/2025 5.57  4.14 - 5.80 10*6/mm3 Final    Hemoglobin 03/04/2025 16.1  13.0 - 17.7 g/dL Final    Hematocrit 03/04/2025 50.8  37.5 - 51.0 % Final    MCV 03/04/2025 91.2  79.0 - 97.0 fL Final    MCH 03/04/2025 28.9  26.6 - 33.0 pg Final    MCHC 03/04/2025 31.7  31.5 - 35.7 g/dL Final    RDW 03/04/2025 13.9  12.3 - 15.4 % Final    RDW-SD 03/04/2025 46.5  37.0 - 54.0 fl Final    MPV 03/04/2025 10.1  6.0 - 12.0 fL Final    Platelets 03/04/2025 278  140 - 450 10*3/mm3 Final    Glucose 03/04/2025 95  65 - 99 mg/dL Final    BUN 03/04/2025 11  6 - 20 mg/dL Final    Creatinine 03/04/2025 0.95  0.76 - 1.27 mg/dL Final    Sodium 03/04/2025 142  136 - 145 mmol/L Final    Potassium 03/04/2025 5.4 (H)  3.5 - 5.2 mmol/L Final    Slight hemolysis detected by analyzer. Result may be falsely elevated.    Chloride 03/04/2025 102  98 - 107 mmol/L Final    CO2 03/04/2025 28.0  22.0 - 29.0 mmol/L Final    Calcium 03/04/2025 9.7  8.6 - 10.5 mg/dL Final    Total Protein 03/04/2025 7.4  6.0 - 8.5 g/dL Final    Albumin 03/04/2025 4.2  3.5 - 5.2 g/dL Final    ALT (SGPT) 03/04/2025 25  1 - 41 U/L Final    AST (SGOT) 03/04/2025 22  1 - 40 U/L Final    Alkaline Phosphatase 03/04/2025 89  39 - 117 U/L Final    Total  Bilirubin 03/04/2025 0.5  0.0 - 1.2 mg/dL Final    Globulin 03/04/2025 3.2  gm/dL Final    Calculated Result    A/G Ratio 03/04/2025 1.3  g/dL Final    BUN/Creatinine Ratio 03/04/2025 11.6  7.0 - 25.0 Final    Anion Gap 03/04/2025 12.0  5.0 - 15.0 mmol/L Final    eGFR 03/04/2025 97.5  >60.0 mL/min/1.73 Final    CEA 03/04/2025 3.61  ng/mL Final    ABO Type 03/04/2025 O   Final    RH type 03/04/2025 Positive   Final     Assessment & Plan   Diagnoses and all orders for this visit:    1. MARIA FERNANDA (generalized anxiety disorder) (Primary)  -     busPIRone (BUSPAR) 5 MG tablet; Take 1 tablet by mouth 2 (Two) Times a Day for 30 days.  Dispense: 60 tablet; Refill: 0  -     escitalopram (Lexapro) 10 MG tablet; Take 1 tablet by mouth Daily for 30 days.  Dispense: 30 tablet; Refill: 0    2. Moderate episode of recurrent major depressive disorder  -     escitalopram (Lexapro) 10 MG tablet; Take 1 tablet by mouth Daily for 30 days.  Dispense: 30 tablet; Refill: 0        Visit Diagnoses:    ICD-10-CM ICD-9-CM   1. MARIA FERNANDA (generalized anxiety disorder)  F41.1 300.02   2. Moderate episode of recurrent major depressive disorder  F33.1 296.32         GOALS:  Short Term Goals: Patient will be compliant with medication, and patient will have no significant medication related side effects.  Patient will be engaged in psychotherapy as indicated.  Patient will report subjective improvement of symptoms.  Long term goals: To stabilize mood and treat/improve subjective symptoms, the patient will stay out of the hospital, the patient will be at an optimal level of functioning, and the patient will take all medications as prescribed.  The patient/guardian verbalized understanding and agreement with goals that were mutually set.      TREATMENT PLAN:   -Continue escitalopram (Lexapro) 10 mg p.o nighty for anxiety and depression.  -Start buspirone (Buspar) 10 mg po twice a day for anxiety.   -discotinue hydroxyzine HCL (Atarax) 10 mg po daily as needed  for anxiety.   -Discussed supportive psychotherapy efforts to develop coping skills to manage stress and emotions. Patient declines at this time.   -Pharmacological and Non-Pharmacological treatment options discussed during today's visit.   -The benefits of a healthy diet and exercise were discussed with patient, especially the positive effects they have on mental health. Patient encouraged to consider lifestyle modification regarding  diet and exercise patterns to maximize results of mental health treatment.   -We discussed sleep hygiene including going to bed at the same time and getting up at the same time every day, decreased caffeine consumption, going to bed early enough to get 7 or 8 hours in bed, reading and relaxing before bedtime, and avoiding stimulating activities close to bedtime.   -Patient acknowledged and verbally consented with current treatment plan and was educated on the importance of compliance with treatment and follow-up appointments.    -Return to clinic in 4 weeks for follow up.  -Reviewed previous available documentation  -Reviewed most recent available labs  -DEONTE reviewed     MEDICATION ISSUES:  -Discussed medication options and treatment plan of prescribed medication as well as the risks, benefits, any black box warnings, and side effects.   -I have explained to the patient drugs of the SSRI class can have side effects such as weight gain, sexual dysfunction, insomnia, headache, nausea. I advised the patient of the black box warning for all antidepressants is the increased risk of suicidal thoughts. In addition, he should monitor for signs of serotonin syndrome: shivering, vital sign instability, elevated temperature and hyperreflexia.    -Buspirone is indicated for generalized anxiety disorder and also used for anxiety symptoms in depression.  This medication requires 1 to 2 weeks of use for the onset of therapeutic effects, with full effects occurring over several weeks.  There is no  "\" as needed\" benefits.  This medication is nonsedating and nonhabit-forming alternative to benzos for anxiety.  Most common side effects are dizziness, nervousness, headache, nausea, and jitteriness.   -Patient is agreeable to call the office with any worsening of symptoms or onset of side effects, or if any concerns or questions arise.    -The contact information for the office is made available to the patient. Patient is agreeable to call 911 or go to the nearest ER should they begin having any SI/HI, or if any urgent concerns arise. No medication side effects or related complaints today.      MEDS ORDERED DURING VISIT:  New Medications Ordered This Visit   Medications    busPIRone (BUSPAR) 5 MG tablet     Sig: Take 1 tablet by mouth 2 (Two) Times a Day for 30 days.     Dispense:  60 tablet     Refill:  0    escitalopram (Lexapro) 10 MG tablet     Sig: Take 1 tablet by mouth Daily for 30 days.     Dispense:  30 tablet     Refill:  0       MEDS DISCONTINUED DURING VISIT:   Medications Discontinued During This Encounter   Medication Reason    hydrOXYzine (ATARAX) 10 MG tablet Alternate therapy    escitalopram (Lexapro) 10 MG tablet Reorder          Follow Up Appointment:   Return in about 4 weeks (around 6/18/2025) for Recheck.           This document has been electronically signed by TONO Etienne  May 21, 2025 09:31 EDT    Dictated Utilizing Dragon Dictation: Part of this note may be an electronic transcription/translation of spoken language to printed text using the Dragon Dictation System. Errors in dictation may reflect use of voice recognition software and not all errors in transcription may have been detected prior to signing.    "

## 2025-06-03 NOTE — PROGRESS NOTES
DATE:  6/4/2025    REASON FOR FOLLOW UP:History of diverticulitis,  History of esophogeal cancer    REFERRING PHYSICIAN:   TONO Quintanilla     CHIEF COMPLAINT:  Follow up of diverticulitis     HISTORY OF PRESENT ILLNESS:   Devan Alarcon is a very pleasant 49 y.o. male who is being seen today at the request of  TONO Quintanilla  for evaluation and treatment of hematochezia and given history of diverticulitis and esophageal cancer.  Patient has history of adenocarcinoma of the esophagus s/p esophagectomy in 2018.  He has continued to follow with oncology with PET/CT annually and has been doing well in this regard.  He currently denies having difficulty with GERD or dysphagia.  Patient reports chronic difficulty with diarrhea since his esophagectomy which has improved with taking Bentyl 20 mg once to twice daily.  At present,he reports having 1-6 BMs per day with stool type IV-V and occasionally type VI on [place] stool scale.  He reports history of recurrent diverticulitis but denies having any recent flares.  He does have intermittent LLQ abdominal pain.  He also occasionally notices hematochezia.  Denies melena.  Of note, he had previous colonoscopy on 11/27/2018 and had four polyps removed in the sigmoid colon.  Two colon polyps were tubular adenomas. The remaining polyps were hyperplastic. Patient denies family history of colon cancer.  He denies unusual weight loss.  He has no other complaints today.    INTERVAL HISTORY:  Mr. Alarcon presents today for follow up.  Since his visit, he underwent robotic resection for complicated diverticular disease on 3/11 with Dr. Tan. He has recovered well without postoperative complications.  He did not require colostomy. Happily, following his colon resection, abdominal pain has resolved.  At present, he reports having ~2 BMs per day with stool type IV-VI on [place] stool scale.  He continues to take fiber as needed.  Repeat colonoscopy with Dr. Maynard was  recommended by Dr. Tan  in 8 weeks.  He has no other complaints today.    PAST MEDICAL HISTORY:  Past Medical History:   Diagnosis Date    Anxiety     Arm fracture     left arm     Arthritis     knee and hands    Depression     Diverticulitis     Esophageal cancer     Oct 2018, esophagectomy    GERD (gastroesophageal reflux disease)     History of migraine headaches     Hyperlipidemia     Hyperlipidemia 01/20/2017    RLS (restless legs syndrome)        PAST SURGICAL HISTORY:  Past Surgical History:   Procedure Laterality Date    COLON RESECTION N/A 3/11/2025    Procedure: COLON RESECTION LOW ANTERIOR LAPAROSCOPIC, TRANSANAL EXTRACTION OF SPECIMEN WITH DAVINCI ROBOT;  Surgeon: Cora Tan MD;  Location:  [facility];  Service: Robotics - DaVinci;  Laterality: N/A;    COLONOSCOPY      COLONOSCOPY N/A 11/10/2021    Procedure: COLONOSCOPY;  Surgeon: Joi Bains MD;  Location:  [facility];  Service: Gastroenterology;  Laterality: N/A;    COLONOSCOPY N/A 12/19/2024    Procedure: COLONOSCOPY;  Surgeon: Joi Bains MD;  [facility];  Service: Gastroenterology;  Laterality: N/A;    CYSTOSCOPY Bilateral 3/11/2025    Procedure: CYSTOSCOPY TEMPORARY PLACEMENT BILATERAL URETERAL CATHETER;  Surgeon: Jhony Garcia MD;  Location:  [facility];  Service: Urology;  Laterality: Bilateral;    ENDOSCOPY N/A 9/20/2018    Procedure: ESOPHAGOGASTRODUODENOSCOPY WITH BIOPSY CPT CODE: 88767;  Surgeon: Baron Mccall MD;  Location:  [facility];  Service: Gastroenterology    ENDOSCOPY N/A 10/17/2018    Procedure: ESOPHAGOGASTRODUODENOSCOPY WITH BIOPSY CPT CODE: 11491;  Surgeon: Baron Mccall MD;  [facility]:  [facility];  Service: Gastroenterology    ENDOSCOPY      with ablation    ESOPHAGECTOMY      MOUTH SURGERY      UPPER GASTROINTESTINAL ENDOSCOPY      VENTRAL/INCISIONAL HERNIA REPAIR N/A 7/12/2019    Procedure: VENTRAL/INCISIONAL HERNIA REPAIR LAPAROSCOPIC;  Surgeon: Jaswinder Goodrich MD;   Location: [facility] OR;  Service: General       FAMILY HISTORY:  Family History   Problem Relation Age of Onset    Osteoporosis Mother     Anxiety disorder Father     Depression Father     Hypertension Father     Drug abuse Brother     Depression Brother     Anxiety disorder Brother        SOCIAL HISTORY:  Social History     Socioeconomic History    Marital status: Significant Other     Spouse name: Autumn Vázquez    Number of children: 0    Highest education level: High school graduate   Tobacco Use    Smoking status: Every Day     Current packs/day: 1.00     Average packs/day: 1 pack/day for 26.1 years (26.1 ttl pk-yrs)     Types: Pipe, Cigarettes     Start date: 10/1/2015    Smokeless tobacco: Never    Tobacco comments:     Quit cigarettes ~2018   Vaping Use    Vaping status: Never Used   Substance and Sexual Activity    Alcohol use: No    Drug use: No    Sexual activity: Defer     Partners: Female       MEDICATIONS:  The current medication list was reviewed in the EMR    Current Outpatient Medications:     busPIRone (BUSPAR) 5 MG tablet, Take 1 tablet by mouth 2 (Two) Times a Day for 30 days., Disp: 60 tablet, Rfl: 0    cyclobenzaprine (FLEXERIL) 5 MG tablet, Take 1 tablet by mouth 3 (Three) Times a Day As Needed for Muscle Spasms., Disp: 90 tablet, Rfl: 5    dicyclomine (BENTYL) 20 MG tablet, Take 1 tablet by mouth Daily., Disp: , Rfl:     escitalopram (Lexapro) 10 MG tablet, Take 1 tablet by mouth Daily for 30 days., Disp: 30 tablet, Rfl: 0    ferrous gluconate (FERGON) 324 MG tablet, Take 1 tablet by mouth Daily With Breakfast., Disp: 30 tablet, Rfl: 5    gabapentin (NEURONTIN) 100 MG capsule, Take 1 capsule by mouth At Night As Needed (restless leg syndrome)., Disp: 30 capsule, Rfl: 2    ALLERGIES:  No Known Allergies    REVIEW OF SYSTEMS:    A comprehensive 14 point review of systems was performed.  Significant findings as mentioned above.  All other systems reviewed and are negative.      Physical Exam   Vital  "Signs: /92 (BP Location: Left arm, Patient Position: Sitting, Cuff Size: Large Adult)   Pulse 72   Ht 188 cm (74.02\")   Wt 103 kg (227 lb)   BMI 29.13 kg/m²     General: Well developed, well nourished, alert and oriented x 3, in no acute distress.   Head: ATNC   Eyes: PERRL, No evidence of conjunctivitis.   Nose: No nasal discharge.   Mouth: Oral mucosal membranes moist. No oral ulceration or hemorrhages.   Neck: Neck supple. No thyromegaly. No JVD.   Lungs: Clear in all fields to A&P without rales, rhonchi or wheezing.   Heart:  Regular rate and rhythm. No murmurs, rubs, or gallops.   Abdomen: Soft. Bowel sounds are normoactive. Nontender with palpation.  Extremities: No cyanosis or edema.   Neurologic: Grossly non-focal exam    RECENT LABS:  Lab Results   Component Value Date    WBC 15.20 (H) 03/13/2025    HGB 12.4 (L) 03/13/2025    HCT 39.2 03/13/2025    MCV 93.3 03/13/2025    RDW 14.6 03/13/2025     03/13/2025    NEUTRORELPCT 76.4 (H) 03/13/2025    LYMPHORELPCT 15.8 (L) 03/13/2025    MONORELPCT 6.7 03/13/2025    EOSRELPCT 0.4 03/13/2025    BASORELPCT 0.3 03/13/2025    NEUTROABS 11.62 (H) 03/13/2025    LYMPHSABS 2.40 03/13/2025       Lab Results   Component Value Date     03/12/2025    K 4.7 03/12/2025    CO2 23.0 03/12/2025     03/12/2025    BUN 11 03/12/2025    CREATININE 0.84 03/12/2025    EGFRIFNONA 104 10/20/2021    GLUCOSE 139 (H) 03/12/2025    CALCIUM 8.4 (L) 03/12/2025    ALKPHOS 89 03/04/2025    AST 22 03/04/2025    ALT 25 03/04/2025    BILITOT 0.5 03/04/2025    ALBUMIN 4.2 03/04/2025    PROTEINTOT 7.4 03/04/2025    MG 1.9 03/12/2025       ASSESSMENT & PLAN:  Devan Alarcon is a very pleasant 49 y.o. male with    1.  History of diverticulitis:  2.  Personal history of colon polyps:  3.  Severe stenosis of the descending colon s/p robotic resection with Dr. Tan 3/11/25:    -He underwent colonoscopy with Dr. Maynard on 12/19/2024 which was notable for diverticulosis in the " sigmoid colon and in the descending colon along with grade 1 internal hemorrhoids.  He was also found to have a benign-appearing, intrinsic severe stenosis in the descending colon that was not transversed.  Following colonoscopy, obtained CT of the abdomen pelvis with contrast to further evaluate which showed a markedly thickened and inflamed sigmoid colon.  Therefore, he was referred to colorectal surgery Dr. Tan in [place]. He underwent robotic resection for complicated diverticular disease on 3/11. He has recovered well without postoperative complications. Repeat colonoscopy with Dr. Maynard was recommended by Dr. Tan  in 8 weeks.  Patient is agreeable to proceed.  Will schedule for next week.  -Patient continues to take fiber supplement with Citrucel as needed.  We have discussed with patient how fiber supplement is beneficial for diverticulosis to help promote bowel regularity and reduce pressure in colon.  -Will have patient return to clinic following repeat colonoscopy.    4.  History of adenocarcinoma of the esophagus s/p esophagectomy in 2018:  - He has continued to follow with oncology with PET/CT annually and is reportedly doing well in this regard.  Follow-up with oncology as previously planned.  He is currently without concerning symptoms.    The patient was in agreement with the plan and all questions were answered to his satisfaction.     Thank you so much for allowing us to participate in the care of Devan Alarcon . Please do not hesitate to contact us with any questions or concerns.           Electronically Signed by: TONO Lao , Marlee 3, 2025 12:54 EDT       CC:   Macey Brewer APRN

## 2025-06-03 NOTE — H&P (VIEW-ONLY)
DATE:  6/4/2025    REASON FOR FOLLOW UP:History of diverticulitis,  History of esophogeal cancer    REFERRING PHYSICIAN:   TONO Quintanilla     CHIEF COMPLAINT:  Follow up of diverticulitis     HISTORY OF PRESENT ILLNESS:   Devan Alarcon is a very pleasant 49 y.o. male who is being seen today at the request of  TONO Quintanilla  for evaluation and treatment of hematochezia and given history of diverticulitis and esophageal cancer.  Patient has history of adenocarcinoma of the esophagus s/p esophagectomy in 2018.  He has continued to follow with oncology with PET/CT annually and has been doing well in this regard.  He currently denies having difficulty with GERD or dysphagia.  Patient reports chronic difficulty with diarrhea since his esophagectomy which has improved with taking Bentyl 20 mg once to twice daily.  At present,he reports having 1-6 BMs per day with stool type IV-V and occasionally type VI on [place] stool scale.  He reports history of recurrent diverticulitis but denies having any recent flares.  He does have intermittent LLQ abdominal pain.  He also occasionally notices hematochezia.  Denies melena.  Of note, he had previous colonoscopy on 11/27/2018 and had four polyps removed in the sigmoid colon.  Two colon polyps were tubular adenomas. The remaining polyps were hyperplastic. Patient denies family history of colon cancer.  He denies unusual weight loss.  He has no other complaints today.    INTERVAL HISTORY:  Mr. Alarcon presents today for follow up.  Since his visit, he underwent robotic resection for complicated diverticular disease on 3/11 with Dr. Tan. He has recovered well without postoperative complications.  He did not require colostomy. Happily, following his colon resection, abdominal pain has resolved.  At present, he reports having ~2 BMs per day with stool type IV-VI on [place] stool scale.  He continues to take fiber as needed.  Repeat colonoscopy with Dr. Maynard was  recommended by Dr. Tan  in 8 weeks.  He has no other complaints today.    PAST MEDICAL HISTORY:  Past Medical History:   Diagnosis Date    Anxiety     Arm fracture     left arm     Arthritis     knee and hands    Depression     Diverticulitis     Esophageal cancer     Oct 2018, esophagectomy    GERD (gastroesophageal reflux disease)     History of migraine headaches     Hyperlipidemia     Hyperlipidemia 01/20/2017    RLS (restless legs syndrome)        PAST SURGICAL HISTORY:  Past Surgical History:   Procedure Laterality Date    COLON RESECTION N/A 3/11/2025    Procedure: COLON RESECTION LOW ANTERIOR LAPAROSCOPIC, TRANSANAL EXTRACTION OF SPECIMEN WITH DAVINCI ROBOT;  Surgeon: Cora Tan MD;  Location:  [facility];  Service: Robotics - DaVinci;  Laterality: N/A;    COLONOSCOPY      COLONOSCOPY N/A 11/10/2021    Procedure: COLONOSCOPY;  Surgeon: Joi Bains MD;  Location:  [facility];  Service: Gastroenterology;  Laterality: N/A;    COLONOSCOPY N/A 12/19/2024    Procedure: COLONOSCOPY;  Surgeon: Joi Bains MD;  Location:  [facility];  Service: Gastroenterology;  Laterality: N/A;    CYSTOSCOPY Bilateral 3/11/2025    Procedure: CYSTOSCOPY TEMPORARY PLACEMENT BILATERAL URETERAL CATHETER;  Surgeon: Jhony Garcia MD;  Location:  [facility];  Service: Urology;  Laterality: Bilateral;    ENDOSCOPY N/A 9/20/2018    Procedure: ESOPHAGOGASTRODUODENOSCOPY WITH BIOPSY CPT CODE: 49966;  Surgeon: Baron Mccall MD;  Location:  [facility];  Service: Gastroenterology    ENDOSCOPY N/A 10/17/2018    Procedure: ESOPHAGOGASTRODUODENOSCOPY WITH BIOPSY CPT CODE: 46621;  Surgeon: Baron Mccall MD;  Location:  [facility];  Service: Gastroenterology    ENDOSCOPY      with ablation    ESOPHAGECTOMY      MOUTH SURGERY      UPPER GASTROINTESTINAL ENDOSCOPY      VENTRAL/INCISIONAL HERNIA REPAIR N/A 7/12/2019    Procedure: VENTRAL/INCISIONAL HERNIA REPAIR LAPAROSCOPIC;  Surgeon: Jaswinder Goodrich MD;   Location: [facility] OR;  Service: General       FAMILY HISTORY:  Family History   Problem Relation Age of Onset    Osteoporosis Mother     Anxiety disorder Father     Depression Father     Hypertension Father     Drug abuse Brother     Depression Brother     Anxiety disorder Brother        SOCIAL HISTORY:  Social History     Socioeconomic History    Marital status: Significant Other     Spouse name: Autumn Vázquez    Number of children: 0    Highest education level: High school graduate   Tobacco Use    Smoking status: Every Day     Current packs/day: 1.00     Average packs/day: 1 pack/day for 26.1 years (26.1 ttl pk-yrs)     Types: Pipe, Cigarettes     Start date: 10/1/2015    Smokeless tobacco: Never    Tobacco comments:     Quit cigarettes ~2018   Vaping Use    Vaping status: Never Used   Substance and Sexual Activity    Alcohol use: No    Drug use: No    Sexual activity: Defer     Partners: Female       MEDICATIONS:  The current medication list was reviewed in the EMR    Current Outpatient Medications:     busPIRone (BUSPAR) 5 MG tablet, Take 1 tablet by mouth 2 (Two) Times a Day for 30 days., Disp: 60 tablet, Rfl: 0    cyclobenzaprine (FLEXERIL) 5 MG tablet, Take 1 tablet by mouth 3 (Three) Times a Day As Needed for Muscle Spasms., Disp: 90 tablet, Rfl: 5    dicyclomine (BENTYL) 20 MG tablet, Take 1 tablet by mouth Daily., Disp: , Rfl:     escitalopram (Lexapro) 10 MG tablet, Take 1 tablet by mouth Daily for 30 days., Disp: 30 tablet, Rfl: 0    ferrous gluconate (FERGON) 324 MG tablet, Take 1 tablet by mouth Daily With Breakfast., Disp: 30 tablet, Rfl: 5    gabapentin (NEURONTIN) 100 MG capsule, Take 1 capsule by mouth At Night As Needed (restless leg syndrome)., Disp: 30 capsule, Rfl: 2    ALLERGIES:  No Known Allergies    REVIEW OF SYSTEMS:    A comprehensive 14 point review of systems was performed.  Significant findings as mentioned above.  All other systems reviewed and are negative.      Physical Exam   Vital  "Signs: /92 (BP Location: Left arm, Patient Position: Sitting, Cuff Size: Large Adult)   Pulse 72   Ht 188 cm (74.02\")   Wt 103 kg (227 lb)   BMI 29.13 kg/m²     General: Well developed, well nourished, alert and oriented x 3, in no acute distress.   Head: ATNC   Eyes: PERRL, No evidence of conjunctivitis.   Nose: No nasal discharge.   Mouth: Oral mucosal membranes moist. No oral ulceration or hemorrhages.   Neck: Neck supple. No thyromegaly. No JVD.   Lungs: Clear in all fields to A&P without rales, rhonchi or wheezing.   Heart:  Regular rate and rhythm. No murmurs, rubs, or gallops.   Abdomen: Soft. Bowel sounds are normoactive. Nontender with palpation.  Extremities: No cyanosis or edema.   Neurologic: Grossly non-focal exam    RECENT LABS:  Lab Results   Component Value Date    WBC 15.20 (H) 03/13/2025    HGB 12.4 (L) 03/13/2025    HCT 39.2 03/13/2025    MCV 93.3 03/13/2025    RDW 14.6 03/13/2025     03/13/2025    NEUTRORELPCT 76.4 (H) 03/13/2025    LYMPHORELPCT 15.8 (L) 03/13/2025    MONORELPCT 6.7 03/13/2025    EOSRELPCT 0.4 03/13/2025    BASORELPCT 0.3 03/13/2025    NEUTROABS 11.62 (H) 03/13/2025    LYMPHSABS 2.40 03/13/2025       Lab Results   Component Value Date     03/12/2025    K 4.7 03/12/2025    CO2 23.0 03/12/2025     03/12/2025    BUN 11 03/12/2025    CREATININE 0.84 03/12/2025    EGFRIFNONA 104 10/20/2021    GLUCOSE 139 (H) 03/12/2025    CALCIUM 8.4 (L) 03/12/2025    ALKPHOS 89 03/04/2025    AST 22 03/04/2025    ALT 25 03/04/2025    BILITOT 0.5 03/04/2025    ALBUMIN 4.2 03/04/2025    PROTEINTOT 7.4 03/04/2025    MG 1.9 03/12/2025       ASSESSMENT & PLAN:  Devan Alarcon is a very pleasant 49 y.o. male with    1.  History of diverticulitis:  2.  Personal history of colon polyps:  3.  Severe stenosis of the descending colon s/p robotic resection with Dr. Tan 3/11/25:    -He underwent colonoscopy with Dr. Maynard on 12/19/2024 which was notable for diverticulosis in the " sigmoid colon and in the descending colon along with grade 1 internal hemorrhoids.  He was also found to have a benign-appearing, intrinsic severe stenosis in the descending colon that was not transversed.  Following colonoscopy, obtained CT of the abdomen pelvis with contrast to further evaluate which showed a markedly thickened and inflamed sigmoid colon.  Therefore, he was referred to colorectal surgery Dr. Tan in [place]. He underwent robotic resection for complicated diverticular disease on 3/11. He has recovered well without postoperative complications. Repeat colonoscopy with Dr. Maynard was recommended by Dr. Tan  in 8 weeks.  Patient is agreeable to proceed.  Will schedule for next week.  -Patient continues to take fiber supplement with Citrucel as needed.  We have discussed with patient how fiber supplement is beneficial for diverticulosis to help promote bowel regularity and reduce pressure in colon.  -Will have patient return to clinic following repeat colonoscopy.    4.  History of adenocarcinoma of the esophagus s/p esophagectomy in 2018:  - He has continued to follow with oncology with PET/CT annually and is reportedly doing well in this regard.  Follow-up with oncology as previously planned.  He is currently without concerning symptoms.    The patient was in agreement with the plan and all questions were answered to his satisfaction.     Thank you so much for allowing us to participate in the care of Devan Alarcon . Please do not hesitate to contact us with any questions or concerns.           Electronically Signed by: TONO Lao , Marlee 3, 2025 12:54 EDT       CC:   Macey Brewer APRN          Misty Valentine)

## 2025-06-04 ENCOUNTER — OFFICE VISIT (OUTPATIENT)
Dept: GASTROENTEROLOGY | Facility: CLINIC | Age: 50
End: 2025-06-04
Payer: COMMERCIAL

## 2025-06-04 VITALS
DIASTOLIC BLOOD PRESSURE: 92 MMHG | BODY MASS INDEX: 29.13 KG/M2 | SYSTOLIC BLOOD PRESSURE: 140 MMHG | HEIGHT: 74 IN | HEART RATE: 72 BPM | WEIGHT: 227 LBS

## 2025-06-04 DIAGNOSIS — Z12.11 ENCOUNTER FOR SCREENING FOR MALIGNANT NEOPLASM OF COLON: Primary | ICD-10-CM

## 2025-06-04 DIAGNOSIS — K57.92 DIVERTICULITIS: ICD-10-CM

## 2025-06-04 DIAGNOSIS — Z86.0100 PERSONAL HISTORY OF COLON POLYPS, UNSPECIFIED: ICD-10-CM

## 2025-06-04 RX ORDER — BISACODYL 5 MG/1
20 TABLET, DELAYED RELEASE ORAL ONCE
Qty: 4 TABLET | Refills: 0 | Status: SHIPPED | OUTPATIENT
Start: 2025-06-04 | End: 2025-06-04

## 2025-06-04 RX ORDER — SODIUM, POTASSIUM,MAG SULFATES 17.5-3.13G
1 SOLUTION, RECONSTITUTED, ORAL ORAL TAKE AS DIRECTED
Qty: 354 ML | Refills: 0 | Status: SHIPPED | OUTPATIENT
Start: 2025-06-04

## 2025-06-10 ENCOUNTER — ANESTHESIA EVENT (OUTPATIENT)
Dept: PERIOP | Facility: HOSPITAL | Age: 50
End: 2025-06-10
Payer: COMMERCIAL

## 2025-06-10 ENCOUNTER — ANESTHESIA (OUTPATIENT)
Dept: PERIOP | Facility: HOSPITAL | Age: 50
End: 2025-06-10
Payer: COMMERCIAL

## 2025-06-10 ENCOUNTER — HOSPITAL ENCOUNTER (OUTPATIENT)
Facility: HOSPITAL | Age: 50
Setting detail: HOSPITAL OUTPATIENT SURGERY
Discharge: HOME | End: 2025-06-10
Attending: INTERNAL MEDICINE | Admitting: INTERNAL MEDICINE
Payer: COMMERCIAL

## 2025-06-10 VITALS
BODY MASS INDEX: 28.88 KG/M2 | WEIGHT: 225 LBS | HEIGHT: 74 IN | DIASTOLIC BLOOD PRESSURE: 87 MMHG | HEART RATE: 80 BPM | TEMPERATURE: 98 F | RESPIRATION RATE: 20 BRPM | SYSTOLIC BLOOD PRESSURE: 131 MMHG | OXYGEN SATURATION: 96 %

## 2025-06-10 DIAGNOSIS — Z12.11 ENCOUNTER FOR SCREENING FOR MALIGNANT NEOPLASM OF COLON: ICD-10-CM

## 2025-06-10 DIAGNOSIS — K57.92 DIVERTICULITIS: ICD-10-CM

## 2025-06-10 DIAGNOSIS — Z86.0100 PERSONAL HISTORY OF COLON POLYPS, UNSPECIFIED: ICD-10-CM

## 2025-06-10 PROCEDURE — 45385 COLONOSCOPY W/LESION REMOVAL: CPT | Performed by: INTERNAL MEDICINE

## 2025-06-10 PROCEDURE — 25010000002 ONDANSETRON PER 1 MG: Performed by: NURSE ANESTHETIST, CERTIFIED REGISTERED

## 2025-06-10 PROCEDURE — 25010000002 PROPOFOL 10 MG/ML EMULSION: Performed by: NURSE ANESTHETIST, CERTIFIED REGISTERED

## 2025-06-10 RX ORDER — KETOROLAC TROMETHAMINE 30 MG/ML
30 INJECTION, SOLUTION INTRAMUSCULAR; INTRAVENOUS EVERY 6 HOURS PRN
Status: DISCONTINUED | OUTPATIENT
Start: 2025-06-10 | End: 2025-06-10 | Stop reason: HOSPADM

## 2025-06-10 RX ORDER — SODIUM CHLORIDE, SODIUM LACTATE, POTASSIUM CHLORIDE, CALCIUM CHLORIDE 600; 310; 30; 20 MG/100ML; MG/100ML; MG/100ML; MG/100ML
125 INJECTION, SOLUTION INTRAVENOUS ONCE
Status: DISCONTINUED | OUTPATIENT
Start: 2025-06-10 | End: 2025-06-10 | Stop reason: HOSPADM

## 2025-06-10 RX ORDER — SODIUM CHLORIDE 0.9 % (FLUSH) 0.9 %
10 SYRINGE (ML) INJECTION AS NEEDED
Status: DISCONTINUED | OUTPATIENT
Start: 2025-06-10 | End: 2025-06-10 | Stop reason: HOSPADM

## 2025-06-10 RX ORDER — MIDAZOLAM HYDROCHLORIDE 1 MG/ML
1 INJECTION, SOLUTION INTRAMUSCULAR; INTRAVENOUS
Status: DISCONTINUED | OUTPATIENT
Start: 2025-06-10 | End: 2025-06-10 | Stop reason: HOSPADM

## 2025-06-10 RX ORDER — ONDANSETRON 2 MG/ML
4 INJECTION INTRAMUSCULAR; INTRAVENOUS AS NEEDED
Status: DISCONTINUED | OUTPATIENT
Start: 2025-06-10 | End: 2025-06-10 | Stop reason: HOSPADM

## 2025-06-10 RX ORDER — FENTANYL CITRATE 50 UG/ML
50 INJECTION, SOLUTION INTRAMUSCULAR; INTRAVENOUS
Status: DISCONTINUED | OUTPATIENT
Start: 2025-06-10 | End: 2025-06-10 | Stop reason: HOSPADM

## 2025-06-10 RX ORDER — PROPOFOL 10 MG/ML
VIAL (ML) INTRAVENOUS CONTINUOUS PRN
Status: DISCONTINUED | OUTPATIENT
Start: 2025-06-10 | End: 2025-06-10 | Stop reason: SURG

## 2025-06-10 RX ORDER — MEPERIDINE HYDROCHLORIDE 25 MG/ML
12.5 INJECTION INTRAMUSCULAR; INTRAVENOUS; SUBCUTANEOUS
Status: DISCONTINUED | OUTPATIENT
Start: 2025-06-10 | End: 2025-06-10 | Stop reason: HOSPADM

## 2025-06-10 RX ORDER — SODIUM CHLORIDE 0.9 % (FLUSH) 0.9 %
10 SYRINGE (ML) INJECTION EVERY 12 HOURS SCHEDULED
Status: DISCONTINUED | OUTPATIENT
Start: 2025-06-10 | End: 2025-06-10 | Stop reason: HOSPADM

## 2025-06-10 RX ORDER — SODIUM CHLORIDE 9 MG/ML
40 INJECTION, SOLUTION INTRAVENOUS AS NEEDED
Status: DISCONTINUED | OUTPATIENT
Start: 2025-06-10 | End: 2025-06-10 | Stop reason: HOSPADM

## 2025-06-10 RX ORDER — IPRATROPIUM BROMIDE AND ALBUTEROL SULFATE 2.5; .5 MG/3ML; MG/3ML
3 SOLUTION RESPIRATORY (INHALATION) ONCE AS NEEDED
Status: DISCONTINUED | OUTPATIENT
Start: 2025-06-10 | End: 2025-06-10 | Stop reason: HOSPADM

## 2025-06-10 RX ORDER — OXYCODONE AND ACETAMINOPHEN 5; 325 MG/1; MG/1
1 TABLET ORAL ONCE AS NEEDED
Status: DISCONTINUED | OUTPATIENT
Start: 2025-06-10 | End: 2025-06-10 | Stop reason: HOSPADM

## 2025-06-10 RX ORDER — SODIUM CHLORIDE, SODIUM LACTATE, POTASSIUM CHLORIDE, CALCIUM CHLORIDE 600; 310; 30; 20 MG/100ML; MG/100ML; MG/100ML; MG/100ML
100 INJECTION, SOLUTION INTRAVENOUS ONCE AS NEEDED
Status: DISCONTINUED | OUTPATIENT
Start: 2025-06-10 | End: 2025-06-10 | Stop reason: HOSPADM

## 2025-06-10 RX ADMIN — PROPOFOL 200 MCG/KG/MIN: 10 INJECTION, EMULSION INTRAVENOUS at 08:27

## 2025-06-10 RX ADMIN — ONDANSETRON 4 MG: 2 INJECTION INTRAMUSCULAR; INTRAVENOUS at 08:51

## 2025-06-10 NOTE — ANESTHESIA PREPROCEDURE EVALUATION
Anesthesia Evaluation     Patient summary reviewed and Nursing notes reviewed   no history of anesthetic complications:   NPO Solid Status: > 8 hours  NPO Liquid Status: > 8 hours           Airway   Mallampati: II  TM distance: >3 FB  Neck ROM: full  No difficulty expected  Dental - normal exam   (+) upper dentures and lower dentures    Pulmonary - normal exam   (+) a smoker,  Cardiovascular - normal exam    (+) dysrhythmias Paroxysmal Atrial Fib, PVD, hyperlipidemia    ROS comment:   Echo 2/19: normal EF, no significant valve disease     Neuro/Psych  (+) headaches, psychiatric history  GI/Hepatic/Renal/Endo    (+) obesity, GERD    Musculoskeletal     Abdominal  - normal exam   Substance History - negative use     OB/GYN negative ob/gyn ROS         Other   arthritis,   history of cancer                  Anesthesia Plan    ASA 3     general   total IV anesthesia  intravenous induction     Anesthetic plan, risks, benefits, and alternatives have been provided, discussed and informed consent has been obtained with: patient.  Pre-procedure education provided  Plan discussed with CRNA.    CODE STATUS:

## 2025-06-10 NOTE — ANESTHESIA POSTPROCEDURE EVALUATION
Patient: Devan Alarcon    Procedure Summary       Date: 06/10/25 Room / Location:  [facility] [address] [place] [address] [facility]    Anesthesia Start: 0823 Anesthesia Stop: 0843    Procedure: COLONOSCOPY Diagnosis:       Encounter for screening for malignant neoplasm of colon      Diverticulitis      Personal history of colon polyps, unspecified      (Encounter for screening for malignant neoplasm of colon [Z12.11])      (Diverticulitis [K57.92])      (Personal history of colon polyps, unspecified [Z86.0100])    Surgeons: Joi Bains MD Provider: Deandre Herrera DO    Anesthesia Type: general ASA Status: 3            Anesthesia Type: general    Vitals  Vitals Value Taken Time   /87 06/10/25 09:05   Temp 98 °F (36.7 °C) 06/10/25 08:44   Pulse 75 06/10/25 09:07   Resp 20 06/10/25 09:05   SpO2 100 % 06/10/25 09:07   Vitals shown include unfiled device data.        Post Anesthesia Care and Evaluation    Patient location during evaluation: PHASE II  Patient participation: complete - patient participated  Level of consciousness: awake and alert  Pain score: 0  Pain management: adequate    Airway patency: patent  Anesthetic complications: No anesthetic complications  PONV Status: controlled  Cardiovascular status: acceptable  Respiratory status: acceptable and room air  Hydration status: euvolemic  No anesthesia care post op

## 2025-06-11 LAB — REF LAB TEST METHOD: NORMAL

## 2025-06-12 ENCOUNTER — RESULTS FOLLOW-UP (OUTPATIENT)
Dept: PERIOP | Facility: HOSPITAL | Age: 50
End: 2025-06-12
Payer: COMMERCIAL

## 2025-06-12 NOTE — PROGRESS NOTES
At the time of your recent colonoscopy, polyps were removed from the colon.  Most of the polyps were hyperplastic which are completely benign.  One polyp was a sessile serrated lesion without dysplasia.  You will need repeat colonoscopy in 5 years.

## 2025-06-12 NOTE — LETTER
June 12, 2025    Devan Alarcon  [address] [place] [address]      Devan,     At the time of your recent colonoscopy, polyps were removed from the colon. Most of the polyps were hyperplastic which are completely benign. One polyp was a sessile serrated lesion without dysplasia. You will need repeat colonoscopy in 5 years.       Thank you,      Joi Bains MD

## 2025-06-18 ENCOUNTER — OFFICE VISIT (OUTPATIENT)
Dept: PSYCHIATRY | Facility: CLINIC | Age: 50
End: 2025-06-18
Payer: COMMERCIAL

## 2025-06-18 VITALS
HEIGHT: 74 IN | OXYGEN SATURATION: 97 % | HEART RATE: 81 BPM | SYSTOLIC BLOOD PRESSURE: 138 MMHG | WEIGHT: 222.2 LBS | BODY MASS INDEX: 28.52 KG/M2 | DIASTOLIC BLOOD PRESSURE: 76 MMHG

## 2025-06-18 DIAGNOSIS — F33.1 MODERATE EPISODE OF RECURRENT MAJOR DEPRESSIVE DISORDER: ICD-10-CM

## 2025-06-18 DIAGNOSIS — F41.1 GAD (GENERALIZED ANXIETY DISORDER): Primary | ICD-10-CM

## 2025-06-18 RX ORDER — BUSPIRONE HYDROCHLORIDE 10 MG/1
10 TABLET ORAL 2 TIMES DAILY
Qty: 60 TABLET | Refills: 0 | Status: SHIPPED | OUTPATIENT
Start: 2025-06-18 | End: 2025-07-18

## 2025-06-18 RX ORDER — ESCITALOPRAM OXALATE 20 MG/1
20 TABLET ORAL DAILY
Qty: 30 TABLET | Refills: 0 | Status: SHIPPED | OUTPATIENT
Start: 2025-06-18 | End: 2025-07-18

## 2025-06-18 NOTE — PROGRESS NOTES
"  Subjective     Devan Alarcon is a 49 y.o. male who presents today for follow up    Chief Complaint:   anxiety and depression    History of Present Illness:    Today is a follow-up visit.    Medication compliance: patient is compliant with medications, denies SE.  The patient reports that the Lexapro has helped but he feels that it could be better.     He reports continued depression, he reports no bad thoughts wishing he were dead.    He feels that he is not getting enjoying things.     He states that he did not take the Lexapro for one dose and Buspar for one day when he prepped for his colonoscopy. He states he did feel more irritable.  Patient had a resection of his colon on 3/10/25 and went back to work at the end of April. He is doing well at work and glad to be able to stay busy.     Details:  Depression is rated at 8/10, with 10 being the worst. PHQ-9 score: 9 (prior 9)   Patient reports having a depressed mood and having anhedonia nearly every day. He reports the lexapro has been helping but feels and increased dose may be beneficial He reports feeling hopeless, helpless, worthless, or powerless.  These symptoms cause impairment in important areas of functioning but have improved with medication.      Anxiety is rated at 4/10, with 10 being the worst. MARIA FERNANDA-7 score: 12 (prior 7)  He reports overall symptoms of excessive excessive anxiety, excessive worry, and difficulty controlling worry have improved. He reports more anxiety around a big group of people can increase anxiety. He reports having deadlines at home and at work cause increase stress and anxiety. He reports feeling less frequently overwhelmed with life, and a decrease in \"what if\" thoughts. The symptoms cause impairment in important areas of functioning has improved with medication.      Patient reports having 4 anxiety attacks. He reports having increased anxiety in traffic and crowds at [facility] in [place], he left and went to [place] [employer]. He reports increased anxiety when he was asked about gender questions when he went for his colostomy. He reports having deadlines and spreadsheets increase stress. He reports increased heart rate, shaky, he reports feeling of procrastination.      Sleep is good. He has been sleeping 8 hours per night. He reports falling asleep easily and could fall back to sleep if he laid there. Nightmares: Denies     Appetite is good. He reports eating regular meals and eating small amounts. He reports drinking coffee in the morning several times a week.     Patient denies SI/HI, A/V hallucinations, or delusions.    Alcohol use: no  Drug use: no  Marijuana use: no  Tobacco use:  started smoking at age 12, started smoking a pipe for the last 10-12 years. He smokes more at work, and 1-2 when at home    Chronic health issues, no acute physical or medical issues today.    The following portions of the patient's history were reviewed and updated as appropriate: allergies, current medications, past family history, past medical history, past social history, past surgical history and problem list.    Previous psychiatric medications include:   Antidepressants: 10/2024 mirtazapine- knocked him out and miss work. Elavil: for headaches and mood. 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 He states that he has lived with anxiety and depression his whole life. He reports seeking help now because he feels the symptoms are getting worse and he does not want to struggle with these feeling by himself.   Family psych history: father had a mood disorder     Past Medical History:  Past Medical History:   Diagnosis Date    Anxiety     Arm fracture     left arm     Arthritis     knee and hands     Depression     Diverticulitis     Esophageal cancer     Oct 2018, esophagectomy    GERD (gastroesophageal reflux disease)     History of migraine headaches     Hyperlipidemia     Hyperlipidemia 01/20/2017    RLS (restless legs syndrome)        Social History:  Social History     Socioeconomic History    Marital status: Significant Other     Spouse name: Autumn Vázquez    Number of children: 0    Highest education level: High school graduate   Tobacco Use    Smoking status: Every Day     Types: Pipe     Passive exposure: Current    Smokeless tobacco: Never   Vaping Use    Vaping status: Never Used   Substance and Sexual Activity    Alcohol use: No    Drug use: No    Sexual activity: Defer     Partners: Female       Family History:  Family History   Problem Relation Age of Onset    Osteoporosis Mother     Anxiety disorder Father     Depression Father     Hypertension Father     Drug abuse Brother     Depression Brother     Anxiety disorder Brother        Past Surgical History:  Past Surgical History:   Procedure Laterality Date    COLON RESECTION N/A 3/11/2025    Procedure: COLON RESECTION LOW ANTERIOR LAPAROSCOPIC, TRANSANAL EXTRACTION OF SPECIMEN WITH DAVINCI ROBOT;  Surgeon: Cora Tan MD;  Location:  [facility];  Service: Robotics - DaVinci;  Laterality: N/A;    COLONOSCOPY      COLONOSCOPY N/A 11/10/2021    Procedure: COLONOSCOPY;  Surgeon: Joi Bains MD;  Location:  [facility];  Service: Gastroenterology;  Laterality: N/A;    COLONOSCOPY N/A 12/19/2024    Procedure: COLONOSCOPY;  Surgeon: Joi Bains MD;  Location:  [facility];  Service: Gastroenterology;  Laterality: N/A;    COLONOSCOPY N/A 6/10/2025    Procedure: COLONOSCOPY;  Surgeon: Joi Bains MD;  Location: [facility] OR;  Service: Gastroenterology;  Laterality: N/A;    CYSTOSCOPY Bilateral 3/11/2025    Procedure: CYSTOSCOPY TEMPORARY PLACEMENT BILATERAL URETERAL CATHETER;  Surgeon: Jhony Garcia MD;  Location:   [facility];  Service: Urology;  Laterality: Bilateral;    ENDOSCOPY N/A 9/20/2018    Procedure: ESOPHAGOGASTRODUODENOSCOPY WITH BIOPSY CPT CODE: 01203;  Surgeon: Baron Mccall MD;  Location:  [facility];  Service: Gastroenterology    ENDOSCOPY N/A 10/17/2018    Procedure: ESOPHAGOGASTRODUODENOSCOPY WITH BIOPSY CPT CODE: 72574;  Surgeon: Baron Mccall MD;  Location:  [facility];  Service: Gastroenterology    ENDOSCOPY      with ablation    ESOPHAGECTOMY      MOUTH SURGERY      UPPER GASTROINTESTINAL ENDOSCOPY      VENTRAL/INCISIONAL HERNIA REPAIR N/A 7/12/2019    Procedure: VENTRAL/INCISIONAL HERNIA REPAIR LAPAROSCOPIC;  Surgeon: Jaswinder Goodrich MD;  Location:  [facility];  Service: General       Problem List:  Patient Active Problem List   Diagnosis    Chronic headache    Hyperlipidemia    Gastroesophageal reflux disease    History of esophagectomy    Paroxysmal atrial fibrillation    Restless leg syndrome    Personal history of esophageal cancer    Status post laparoscopic hernia repair    Diverticulosis of large intestine    Diverticulitis    Pain in both lower extremities    PVD (peripheral vascular disease)    Blood in stool    Iron deficiency anemia    Encounter for screening for malignant neoplasm of colon    Personal history of colon polyps, unspecified    Diverticulosis large intestine w/o perforation or abscess w/o bleeding    Sigmoid diverticulitis, recurrent    S/P colectomy (Robotic LAR, I and D of pelvic abscess, left ureterolysis)    Acute postoperative pain    Leukocytosis, likely reactive    Anxiety and depression       Allergy:   No Known Allergies     Current Medications:   Current Outpatient Medications   Medication Sig Dispense Refill    busPIRone (BUSPAR) 10 MG tablet Take 1 tablet by mouth 2 (Two) Times a Day for 30 days. 60 tablet 0    cyclobenzaprine (FLEXERIL) 5 MG tablet Take 1 tablet by mouth 3 (Three) Times a Day As Needed for Muscle Spasms. 90 tablet 5    dicyclomine (BENTYL) 20  MG tablet Take 1 tablet by mouth Daily.      escitalopram (Lexapro) 20 MG tablet Take 1 tablet by mouth Daily for 30 days. 30 tablet 0    ferrous gluconate (FERGON) 324 MG tablet Take 1 tablet by mouth Daily With Breakfast. 30 tablet 5    gabapentin (NEURONTIN) 100 MG capsule Take 1 capsule by mouth At Night As Needed (restless leg syndrome). 30 capsule 2     No current facility-administered medications for this visit.       Review of Symptoms:    Review of Systems   Constitutional:  Positive for fatigue.   HENT: Negative.     Eyes: Negative.    Respiratory: Negative.  Negative for shortness of breath.    Cardiovascular: Negative.  Negative for chest pain and palpitations.   Gastrointestinal: Negative.  Negative for nausea.   Endocrine: Negative.    Musculoskeletal: Negative.    Skin: Negative.    Allergic/Immunologic: Negative.    Neurological: Negative.  Negative for dizziness.   Hematological: Negative.    Psychiatric/Behavioral:  Positive for dysphoric mood, depressed mood and stress. The patient is nervous/anxious.        Objective     Physical Exam:   Physical Exam  Constitutional:       Appearance: Normal appearance.   Eyes:      Pupils: Pupils are equal, round, and reactive to light.   Cardiovascular:      Rate and Rhythm: Normal rate.   Pulmonary:      Effort: Pulmonary effort is normal.   Musculoskeletal:         General: Normal range of motion.      Cervical back: Normal range of motion.   Skin:     General: Skin is warm and dry.   Neurological:      General: No focal deficit present.      Mental Status: He is alert and oriented to person, place, and time.   Psychiatric:         Attention and Perception: Attention and perception normal.         Mood and Affect: Affect normal. Mood is anxious and depressed.         Speech: Speech normal.         Behavior: Behavior normal. Behavior is cooperative.         Thought Content: Thought content normal.         Cognition and Memory: Cognition and memory normal.     "     Judgment: Judgment normal.       Vitals:  Blood pressure 138/76, pulse 81, height 188 cm (74.02\"), weight 101 kg (222 lb 3.2 oz), SpO2 97%.   Body mass index is 28.52 kg/m².  Wt Readings from Last 3 Encounters:   06/18/25 101 kg (222 lb 3.2 oz)   06/10/25 102 kg (225 lb)   06/04/25 103 kg (227 lb)       Last 3 Blood Pressure and Pulse Readings:  BP Readings from Last 3 Encounters:   06/18/25 138/76   06/10/25 131/87   06/04/25 140/92     Pulse Readings from Last 3 Encounters:   06/18/25 81   06/10/25 80   06/04/25 72     PHQ-9 Score: June 18, 2025  Little interest or pleasure in doing things? Nearly every day   Feeling down, depressed, or hopeless? Nearly every day   PHQ-2 Total Score 6   Trouble falling or staying asleep, or sleeping too much? Several days   Feeling tired or having little energy? Several days   Poor appetite or overeating? Not at all   Feeling bad about yourself - or that you are a failure or have let yourself or your family down? Several days   Trouble concentrating on things, such as reading the newspaper or watching television? Not at all   Moving or speaking so slowly that other people could have noticed? Or the opposite - being so fidgety or restless that you have been moving around a lot more than usual? Not at all     Thoughts that you would be better off dead, or of hurting yourself in some way? Not at all   PHQ-9 Total Score 9   If you checked off any problems, how difficult have these problems made it for you to do your work, take care of things at home, or get along with other people? Somewhat difficult          MARIA FERNANDA-7 Score: June 18, 2025  Feeling nervous, anxious or on edge: Nearly every day  Not being able to stop or control worrying: More than half the days  Worrying too much about different things: Several days  Trouble Relaxing: Several days  Being so restless that it is hard to sit still: Several days  Feeling afraid as if something awful might happen: More than half the " days  Becoming easily annoyed or irritable: More than half the days  MARIA FERNANDA 7 Total Score: 12  If you checked any problems, how difficult have these problems made it for you to do your work, take care of things at home, or get along with other people: Somewhat difficult     Appearance:  Patient is a 49-year-old  male.  He is casually dressed in jeans, a graphic black kem shirt, and tennis shoes. His brown hair is short and covered by a ball cap.  His mustache is neatly trimmed. He is appropriately dressed for his age and the weather.   Gait, Station, Strength: WNL    Mental Status Exam:   Hygiene:   good  Cooperation:  Cooperative  Eye Contact:  Good  Psychomotor Behavior:  Appropriate  Affect: Appropriate   Mood: depressed and anxious  Hopelessness: 7  Speech:  Normal  Thought Process:  Goal directed  Thought Content:  Mood congruent  Suicidal:  None  Homicidal:  None  Hallucinations:  None  Delusion:  None  Memory:  Intact  Orientation:  Person, Place, Time, and Situation  Reliability:  good  Insight:  Fair  Judgement:  Good  Impulse Control:  Good  Physical/Medical Issues:  Yes , see chart     Lab Results:   Admission on 06/10/2025, Discharged on 06/10/2025   Component Date Value Ref Range Status    Reference Lab Report 06/10/2025    Final                    Value:[facility]  [address] [place] [address]  Phone: [phone] or [phone]  Fax: [phone]  Santosh Ortiz M.D., Medical Director    PATIENT NAME                           LABORATORY [id number]  [address]                    [id number]  [id number]                         AGE              SEX  N           CLIENT REF #  [facility] [place]              49      1975      [id number]   [id number]    [address]                     REQUESTING M.D.     [place] [address]                   [place]  DATE COLLECTED      DATE RECEIVED      DATE  "REPORTED  06/10/2025          06/10/2025         06/11/2025    DIAGNOSIS:  A.   DESCENDING COLON POLYP:  Fragments of sessile serrated lesion without dysplasia  B.   DESCENDING COLON POLYP, X4:  Polypoid fragments of benign colonic mucosa with focal hyperplastic  change, admixed with fragments of granulation tissue, negative                           for  dysplasia    [facility]    CLINICAL HISTORY:  Encounter for screening for malignant neoplasm of colon; Diverticulitis;  Personal history of colon polyps, unspecified    SPECIMENS RECEIVED:  A.  DESCENDING COLON POLYP  B.  DESCENDING COLON POLYP, X4    MICROSCOPIC DESCRIPTION:  A.  Tissue blocks are prepared and slides are examined microscopically. See  diagnosis for details.  B.  Tissue blocks are prepared and slides are examined microscopically. See  diagnosis for details.    Professional interpretation rendered by Sheron Olivier M.D., JUAN CARLOS at  [facility], [address] [place] [address].    GROSS DESCRIPTION:  A.  Labeled \"descending colon polyp\" consists of multiple pieces of tan soft  tissue measuring 1.6 x 1.0 x 0.2 cm in aggregate.  Specimens are filtered  and submitted entirely in 1 block.  BKO  B.  Labeled \"descending colon left colon anastomosis polyp\" consists of  multiple pieces of tan soft tissue measuring 1.5 x 1.0 x 0.3 cm in aggregate.  Specimens are filtered                           and submitted entirely in 1 block.  BKO    REVIEWED, DIAGNOSED AND ELECTRONICALLY  SIGNED BY:    Sheron Olivier M.D., SHELBI.  CPT CODES:  88305x2     Office Visit on 03/26/2025   Component Date Value Ref Range Status    THC, Screen, Urine 03/26/2025 Negative  Negative Final    Phencyclidine (PCP), Urine 03/26/2025 Negative  Negative Final    Cocaine Screen, Urine 03/26/2025 Negative  Negative Final    Methamphetamine, Ur 03/26/2025 Negative  Negative Final    Opiate Screen 03/26/2025 Positive (A)  Negative Final    Amphetamine Screen, Urine 03/26/2025 " Negative  Negative Final    Benzodiazepine Screen, Urine 03/26/2025 Positive (A)  Negative Final    Tricyclic Antidepressants Screen 03/26/2025 Positive (A)  Negative Final    Methadone Screen, Urine 03/26/2025 Negative  Negative Final    Barbiturates Screen, Urine 03/26/2025 Negative  Negative Final    Oxycodone Screen, Urine 03/26/2025 Negative  Negative Final    Buprenorphine, Screen, Urine 03/26/2025 Negative  Negative Final    Fentanyl, Urine 03/26/2025 Negative  Negative Final     Assessment & Plan   Diagnoses and all orders for this visit:    1. MARIA FERNANDA (generalized anxiety disorder) (Primary)  -     busPIRone (BUSPAR) 10 MG tablet; Take 1 tablet by mouth 2 (Two) Times a Day for 30 days.  Dispense: 60 tablet; Refill: 0  -     escitalopram (Lexapro) 20 MG tablet; Take 1 tablet by mouth Daily for 30 days.  Dispense: 30 tablet; Refill: 0    2. Moderate episode of recurrent major depressive disorder  -     escitalopram (Lexapro) 20 MG tablet; Take 1 tablet by mouth Daily for 30 days.  Dispense: 30 tablet; Refill: 0      Visit Diagnoses:    ICD-10-CM ICD-9-CM   1. MARIA FERNANDA (generalized anxiety disorder)  F41.1 300.02   2. Moderate episode of recurrent major depressive disorder  F33.1 296.32     GOALS:  Short Term Goals: Patient will be compliant with medication, and patient will have no significant medication related side effects.  Patient will be engaged in psychotherapy as indicated.  Patient will report subjective improvement of symptoms.  Long term goals: To stabilize mood and treat/improve subjective symptoms, the patient will stay out of the hospital, the patient will be at an optimal level of functioning, and the patient will take all medications as prescribed.  The patient/guardian verbalized understanding and agreement with goals that were mutually set.    TREATMENT PLAN:   -Increase escitalopram (Lexapro) to 10 mg p.o nighty for anxiety and depression.  -Increase buspirone (Buspar) to 10 mg po twice a day for  "anxiety.   -Discussed supportive psychotherapy efforts to develop coping skills to manage stress and emotions. Patient declines at this time.   -Pharmacological and Non-Pharmacological treatment options discussed during today's visit.   -The benefits of a healthy diet and exercise were discussed with patient, especially the positive effects they have on mental health. Patient encouraged to consider lifestyle modification regarding  diet and exercise patterns to maximize results of mental health treatment.   -We discussed sleep hygiene including going to bed at the same time and getting up at the same time every day, decreased caffeine consumption, going to bed early enough to get 7 or 8 hours in bed, reading and relaxing before bedtime, and avoiding stimulating activities close to bedtime.   -Patient acknowledged and verbally consented with current treatment plan and was educated on the importance of compliance with treatment and follow-up appointments.    -Return to clinic in 5 weeks for follow up.  -Reviewed previous available documentation  -Reviewed most recent available labs  -DEONTE reviewed     MEDICATION ISSUES:  -Discussed medication options and treatment plan of prescribed medication as well as the risks, benefits, any black box warnings, and side effects.   -I have explained to the patient drugs of the SSRI class can have side effects such as weight gain, sexual dysfunction, insomnia, headache, nausea. I advised the patient of the black box warning for all antidepressants is the increased risk of suicidal thoughts. In addition, he should monitor for signs of serotonin syndrome: shivering, vital sign instability, elevated temperature and hyperreflexia.    -Buspirone is indicated for generalized anxiety disorder and also used for anxiety symptoms in depression.  This medication requires 1 to 2 weeks of use for the onset of therapeutic effects, with full effects occurring over several weeks.  There is no \" " "as needed\" benefits.  This medication is nonsedating and nonhabit-forming alternative to benzos for anxiety.  Most common side effects are dizziness, nervousness, headache, nausea, and jitteriness.   -Patient is agreeable to call the office with any worsening of symptoms or onset of side effects, or if any concerns or questions arise.    -The contact information for the office is made available to the patient. Patient is agreeable to call 911 or go to the nearest ER should they begin having any SI/HI, or if any urgent concerns arise. No medication side effects or related complaints today.      MEDS ORDERED DURING VISIT:  New Medications Ordered This Visit   Medications    busPIRone (BUSPAR) 10 MG tablet     Sig: Take 1 tablet by mouth 2 (Two) Times a Day for 30 days.     Dispense:  60 tablet     Refill:  0    escitalopram (Lexapro) 20 MG tablet     Sig: Take 1 tablet by mouth Daily for 30 days.     Dispense:  30 tablet     Refill:  0       MEDS DISCONTINUED DURING VISIT:   Medications Discontinued During This Encounter   Medication Reason    busPIRone (BUSPAR) 5 MG tablet Reorder    escitalopram (Lexapro) 10 MG tablet Reorder            Follow Up Appointment:   Return in about 5 weeks (around 7/23/2025) for Recheck.           This document has been electronically signed by TONO Etienne  June 18, 2025 10:36 EDT    Dictated Utilizing Dragon Dictation: Part of this note may be an electronic transcription/translation of spoken language to printed text using the Dragon Dictation System. Errors in dictation may reflect use of voice recognition software and not all errors in transcription may have been detected prior to signing.  "

## 2025-07-18 DIAGNOSIS — F41.1 GAD (GENERALIZED ANXIETY DISORDER): ICD-10-CM

## 2025-07-18 DIAGNOSIS — F33.1 MODERATE EPISODE OF RECURRENT MAJOR DEPRESSIVE DISORDER: ICD-10-CM

## 2025-07-22 RX ORDER — ESCITALOPRAM OXALATE 20 MG/1
20 TABLET ORAL DAILY
Qty: 30 TABLET | Refills: 0 | Status: SHIPPED | OUTPATIENT
Start: 2025-07-22

## 2025-07-22 RX ORDER — BUSPIRONE HYDROCHLORIDE 10 MG/1
10 TABLET ORAL 2 TIMES DAILY
Qty: 60 TABLET | Refills: 0 | Status: SHIPPED | OUTPATIENT
Start: 2025-07-22

## 2025-08-27 ENCOUNTER — OFFICE VISIT (OUTPATIENT)
Dept: PSYCHIATRY | Facility: CLINIC | Age: 50
End: 2025-08-27
Payer: COMMERCIAL

## 2025-08-27 VITALS
WEIGHT: 225.4 LBS | SYSTOLIC BLOOD PRESSURE: 138 MMHG | OXYGEN SATURATION: 97 % | HEART RATE: 74 BPM | BODY MASS INDEX: 28.93 KG/M2 | DIASTOLIC BLOOD PRESSURE: 94 MMHG | HEIGHT: 74 IN

## 2025-08-27 DIAGNOSIS — F41.1 GAD (GENERALIZED ANXIETY DISORDER): ICD-10-CM

## 2025-08-27 DIAGNOSIS — F33.1 MODERATE EPISODE OF RECURRENT MAJOR DEPRESSIVE DISORDER: Primary | ICD-10-CM

## 2025-08-27 RX ORDER — BUSPIRONE HYDROCHLORIDE 10 MG/1
10 TABLET ORAL 2 TIMES DAILY
Qty: 60 TABLET | Refills: 1 | Status: SHIPPED | OUTPATIENT
Start: 2025-08-27 | End: 2025-10-26

## (undated) DEVICE — Device

## (undated) DEVICE — THE BITE BLOCK MAXI, LATEX FREE STRAP IS USED TO PROTECT THE ENDOSCOPE INSERTION TUBE FROM BEING BITTEN BY THE PATIENT.

## (undated) DEVICE — DEFENDO AIR WATER SUCTION AND BIOPSY VALVE KIT FOR  OLYMPUS: Brand: DEFENDO AIR/WATER/SUCTION AND BIOPSY VALVE

## (undated) DEVICE — 3M™ IOBAN™ 2 ANTIMICROBIAL INCISE DRAPE 6650EZ: Brand: IOBAN™ 2

## (undated) DEVICE — Device: Brand: DEFENDO AIR/WATER/SUCTION AND BIOPSY VALVE

## (undated) DEVICE — CONN Y IRR DISP 1P/U

## (undated) DEVICE — SUT MNCRYL PLS ANTIB UD 3/0 PS2 27IN

## (undated) DEVICE — TRENDELENBURG WINGPAD POSITIONING KIT DELUXE - WITHOUT BODY STRAP: Brand: SOULE MEDICAL

## (undated) DEVICE — BLADELESS OBTURATOR: Brand: WECK VISTA

## (undated) DEVICE — SAFESECURE,SECUREMENT,FOLEY CATH,STERILE: Brand: MEDLINE

## (undated) DEVICE — PK UROL DAVINCI 10

## (undated) DEVICE — SYR LL TP 10ML STRL

## (undated) DEVICE — TIP COVER ACCESSORY

## (undated) DEVICE — DRAPE,UTILTY,TAPE,15X26, 4EA/PK: Brand: MEDLINE

## (undated) DEVICE — SPNG LAP PREWSH SFTPK 18X18IN STRL PK/5

## (undated) DEVICE — FRCP BX RADJAW4 NDL 2.8 240CM LG OG BX40

## (undated) DEVICE — SINGLE PORT MANIFOLD: Brand: NEPTUNE 2

## (undated) DEVICE — 1LYRTR 16FR10ML100%SIL UMS SNP: Brand: MEDLINE INDUSTRIES, INC.

## (undated) DEVICE — ST TBG AIRSEAL FLTR TRI LUM

## (undated) DEVICE — ENDOGATOR AUXILIARY WATER JET CONNECTOR: Brand: ENDOGATOR

## (undated) DEVICE — TUBING, SUCTION, 1/4" X 20', STRAIGHT: Brand: MEDLINE INDUSTRIES, INC.

## (undated) DEVICE — GLV SURG PREMIERPRO GAMMEX NEOPRN PF SZ7.5 GRN

## (undated) DEVICE — BLANKT WARM UPPR/BDY ARM/OUT 57X196CM

## (undated) DEVICE — BNDG ADHS CURAD FLX/FABRC 2X4IN STRL LF

## (undated) DEVICE — THE EXACTO COLD SNARE IS INTENDED TO BE USED WITHOUT DIATHERMIC ENERGY FOR THE ENDOSCOPIC RESECTION OF POLYP TISSUE IN THE GASTROINTESTINAL TRACT.: Brand: EXACTO

## (undated) DEVICE — WOUND RETRACTOR AND PROTECTOR: Brand: ALEXIS WOUND PROTECTOR-RETRACTOR

## (undated) DEVICE — GOWN,REINF,POLY,ECL,PP SLV,XL: Brand: MEDLINE

## (undated) DEVICE — JELLY,LUBE,STERILE,FLIP TOP,TUBE,2-OZ: Brand: MEDLINE

## (undated) DEVICE — ARM DRAPE

## (undated) DEVICE — CYSTO/BLADDER IRRIGATION SET, REGULATING CLAMP

## (undated) DEVICE — ENDOGATOR TUBING FOR ENDOGATOR EGP-100 IRRIGATION PUMP,OLYMPUS OFP PUMP, OLYMPUS AFU-100 PUMP AND ERBE EIP2 PUMP: Brand: ENDOGATOR

## (undated) DEVICE — DRAPE,UNDERBUTTOCKS,PCH,STERILE: Brand: MEDLINE

## (undated) DEVICE — GLV SURG DERMASSURE GRN LF PF 7.0

## (undated) DEVICE — SYR LUERLOK 30CC

## (undated) DEVICE — ENDOPATH XCEL BLADELESS TROCARS WITH STABILITY SLEEVES: Brand: ENDOPATH XCEL

## (undated) DEVICE — ENDOCUT SCISSOR TIP, DISPOSABLE: Brand: RENEW

## (undated) DEVICE — VESSEL SEALER EXTEND: Brand: ENDOWRIST

## (undated) DEVICE — SUCTION CANISTER, 1500CC, RIGID: Brand: DEROYAL

## (undated) DEVICE — HOLDER: Brand: DEROYAL

## (undated) DEVICE — SUT VIC 2/0 UR6 27IN J602H

## (undated) DEVICE — ENDOPATH ELECTROSURGERY PROBE PLUS II PISTOL HAND CONTROL PISTOL GRIP HANDLES WITH SUCTION AND IRRRIGATION FOR HAND CONTROL MONOPOLAR ELCTROSURGERY USE ONLY WITH PROBE PLUS II SHAFTS: Brand: ENDOPATH

## (undated) DEVICE — ENDOPATH ELECTROSURGERY PROBE PLUS II 5MM RIGHT ANGLE MONOPOLAR ELECTROSURGERY SUCTION AND IRRRIGATION SHAFTS WITH RIGHT ANGLE ELECTRODE - USE ONLY WITH PROBE PLUS II HANDLES: Brand: ENDOPATH

## (undated) DEVICE — COLUMN DRAPE

## (undated) DEVICE — PREMIUM DRY TRAY LF: Brand: MEDLINE INDUSTRIES, INC.

## (undated) DEVICE — [HIGH FLOW INSUFFLATOR,  DO NOT USE IF PACKAGE IS DAMAGED,  KEEP DRY,  KEEP AWAY FROM SUNLIGHT,  PROTECT FROM HEAT AND RADIOACTIVE SOURCES.]: Brand: PNEUMOSURE

## (undated) DEVICE — POLYP TRAP: Brand: TRAPEASE®

## (undated) DEVICE — SEAL

## (undated) DEVICE — PK LAP GEN 70

## (undated) DEVICE — TROCAR: Brand: KII® SLEEVE

## (undated) DEVICE — SNAR POLYP CAPTIFLX MICRO OVL 13MM 240CM

## (undated) DEVICE — TRAP,MUCUS SPECIMEN,40CC: Brand: MEDLINE

## (undated) DEVICE — DRSNG WND BORDR/ADHS NONADHR/GZ LF 2X2IN STRL

## (undated) DEVICE — ENCORE® LATEX MICRO SIZE 7.5, STERILE LATEX POWDER-FREE SURGICAL GLOVE: Brand: ENCORE

## (undated) DEVICE — SYS SKIN EXOFIN WND CLS 4X22CM

## (undated) DEVICE — 3M™ STERI-STRIP™ REINFORCED ADHESIVE SKIN CLOSURES, R1547, 1/2 IN X 4 IN (12 MM X 100 MM), 6 STRIPS/ENVELOPE: Brand: 3M™ STERI-STRIP™

## (undated) DEVICE — TOWEL,OR,DSP,ST,BLUE,STD,4/PK,20PK/CS: Brand: MEDLINE

## (undated) DEVICE — LEGGINGS, PAIR, 29X43, STERILE: Brand: MEDLINE

## (undated) DEVICE — TROCAR: Brand: KII FIOS FIRST ENTRY

## (undated) DEVICE — STPCK 4WY ON/OFF VLV M/COLAR FIT 45PSI STRL

## (undated) DEVICE — MARKR SKIN W/RULR AND LBL

## (undated) DEVICE — GLV SURG PREMIERPRO MIC LTX PF SZ6.5 BRN

## (undated) DEVICE — CATH URETRL FLXITP POLLACK STD 5F 70CM

## (undated) DEVICE — TROC BLADLES ANCHORPORT/OPTI LP 8X120MM 1P/U

## (undated) DEVICE — SUT VIC 2/0 UR6 27IN VCP602H

## (undated) DEVICE — ANTIBACTERIAL UNDYED BRAIDED (POLYGLACTIN 910), SYNTHETIC ABSORBABLE SUTURE: Brand: COATED VICRYL